# Patient Record
Sex: MALE | Race: WHITE | Employment: OTHER | URBAN - METROPOLITAN AREA
[De-identification: names, ages, dates, MRNs, and addresses within clinical notes are randomized per-mention and may not be internally consistent; named-entity substitution may affect disease eponyms.]

---

## 2017-01-16 PROBLEM — K42.9 UMBILICAL HERNIA WITHOUT OBSTRUCTION AND WITHOUT GANGRENE: Status: ACTIVE | Noted: 2017-01-16

## 2017-03-17 ENCOUNTER — HOSPITAL ENCOUNTER (OUTPATIENT)
Age: 82
Discharge: HOME | End: 2017-03-17
Attending: EMERGENCY MEDICINE
Payer: MEDICARE

## 2017-03-17 VITALS
OXYGEN SATURATION: 99 % | HEART RATE: 70 BPM | TEMPERATURE: 97 F | RESPIRATION RATE: 18 BRPM | SYSTOLIC BLOOD PRESSURE: 149 MMHG | BODY MASS INDEX: 26.52 KG/M2 | WEIGHT: 165 LBS | DIASTOLIC BLOOD PRESSURE: 92 MMHG | HEIGHT: 66 IN

## 2017-03-17 DIAGNOSIS — L03.116 CELLULITIS OF LEFT LOWER EXTREMITY: Primary | ICD-10-CM

## 2017-03-17 PROCEDURE — 87070 CULTURE OTHR SPECIMN AEROBIC: CPT | Performed by: EMERGENCY MEDICINE

## 2017-03-17 PROCEDURE — 99214 OFFICE O/P EST MOD 30 MIN: CPT

## 2017-03-17 PROCEDURE — 87205 SMEAR GRAM STAIN: CPT | Performed by: EMERGENCY MEDICINE

## 2017-03-17 RX ORDER — DOXYCYCLINE HYCLATE 100 MG/1
100 CAPSULE ORAL 2 TIMES DAILY
Qty: 20 CAPSULE | Refills: 0 | Status: SHIPPED | OUTPATIENT
Start: 2017-03-17 | End: 2017-03-27

## 2017-03-17 NOTE — ED INITIAL ASSESSMENT (HPI)
Left lower leg redness,+ swelling, was seen at a [facility], was on antibiotic , denies fever

## 2017-03-17 NOTE — ED PROVIDER NOTES
Tucker Villalpando is a 80year old male who presents for evaluation of  left leg swelling and redness  HPI:   Pt complains of left leg swelling and redness which he has had for the past week. Patient states he sustained a wound to his lateral left distal leg. T12   • Osteopenia      but with compression fractures   • History of colonic polyps      Repeat Colonscopy PRN   • Shingles    • Bilateral renal cysts      simple   • IFG (impaired fasting glucose)    • Sacral fracture (HCC)    • H/O cardiovascular stress Family history is noncontributory to current complaint      REVIEW OF SYSTEMS:     GENERAL: Denies fever or Chills.     EYES:denies blurred vision   HEENT: denies nasal congestion, sinus pain or ST  LUNGS: denies shortness of breath or cough  CARDIOVASCUL

## 2017-03-23 ENCOUNTER — OFFICE VISIT (OUTPATIENT)
Dept: AUDIOLOGY | Facility: CLINIC | Age: 82
End: 2017-03-23

## 2017-03-23 DIAGNOSIS — H90.3 SENSORINEURAL HEARING LOSS, BILATERAL: Primary | ICD-10-CM

## 2017-03-23 PROCEDURE — 92593 HEARING AID CHECK, BOTH EARS: CPT | Performed by: AUDIOLOGIST

## 2017-03-23 NOTE — PROGRESS NOTES
HEARING AID FOLLOW-UP    Mireille Hastings  7/1/1934  [id number]    PT noted increased difficulty hearing/understanding speech. Pt elected to try new technology.   Discussed communication needs, completed a needs assessment, discussed style, technology, trial

## 2017-03-29 ENCOUNTER — PRIOR ORIGINAL RECORDS (OUTPATIENT)
Dept: OTHER | Age: 82
End: 2017-03-29

## 2017-04-03 ENCOUNTER — PRIOR ORIGINAL RECORDS (OUTPATIENT)
Dept: OTHER | Age: 82
End: 2017-04-03

## 2017-04-04 ENCOUNTER — PRIOR ORIGINAL RECORDS (OUTPATIENT)
Dept: OTHER | Age: 82
End: 2017-04-04

## 2017-04-06 ENCOUNTER — OFFICE VISIT (OUTPATIENT)
Dept: AUDIOLOGY | Facility: CLINIC | Age: 82
End: 2017-04-06

## 2017-04-06 DIAGNOSIS — H90.3 SENSORINEURAL HEARING LOSS, BILATERAL: Primary | ICD-10-CM

## 2017-04-06 PROCEDURE — V5261 HEARING AID, DIGIT, BIN, BTE: HCPCS | Performed by: AUDIOLOGIST

## 2017-04-06 NOTE — PROGRESS NOTES
Hearing Aid Fitting    Mireille Wick purchased two hearing aids as a long-time user.     Hearing Aid Information       Right    Left   Make: Phonak Make: Phonak   Model: [id number] 312T Model: [id number]   Serial Number: [id number] Serial Number: [id number]

## 2017-04-12 ENCOUNTER — MYAURORA ACCOUNT LINK (OUTPATIENT)
Dept: OTHER | Age: 82
End: 2017-04-12

## 2017-04-12 ENCOUNTER — PRIOR ORIGINAL RECORDS (OUTPATIENT)
Dept: OTHER | Age: 82
End: 2017-04-12

## 2017-04-17 ENCOUNTER — LAB ENCOUNTER (OUTPATIENT)
Dept: LAB | Facility: HOSPITAL | Age: 82
End: 2017-04-17
Attending: UROLOGY
Payer: MEDICARE

## 2017-04-17 DIAGNOSIS — C61 PROSTATE CANCER (HCC): Primary | ICD-10-CM

## 2017-04-17 PROCEDURE — 36415 COLL VENOUS BLD VENIPUNCTURE: CPT

## 2017-04-17 NOTE — OR PREOP
StNatalie Watkins Rep., Mo Mendoza paged to inform him that a Rep. Needs to be at [facility] on 4-21-17 to reprogram Pacemaker.

## 2017-04-17 NOTE — OR PREOP
[address] Rep returned call, informed of patients upcoming surgery, informed her that we will call her with confirmed time for surgery in the afternoon of 4-20-17, surgery is 4-21-17.

## 2017-04-21 ENCOUNTER — ANESTHESIA (OUTPATIENT)
Dept: SURGERY | Facility: HOSPITAL | Age: 82
End: 2017-04-21
Payer: MEDICARE

## 2017-04-21 ENCOUNTER — SURGERY (OUTPATIENT)
Age: 82
End: 2017-04-21

## 2017-04-21 ENCOUNTER — ANESTHESIA EVENT (OUTPATIENT)
Dept: SURGERY | Facility: HOSPITAL | Age: 82
End: 2017-04-21
Payer: MEDICARE

## 2017-04-21 ENCOUNTER — HOSPITAL ENCOUNTER (OUTPATIENT)
Facility: HOSPITAL | Age: 82
Setting detail: HOSPITAL OUTPATIENT SURGERY
Discharge: HOME | End: 2017-04-21
Attending: SURGERY | Admitting: SURGERY
Payer: MEDICARE

## 2017-04-21 VITALS
RESPIRATION RATE: 16 BRPM | WEIGHT: 161.31 LBS | TEMPERATURE: 97 F | OXYGEN SATURATION: 97 % | HEART RATE: 70 BPM | SYSTOLIC BLOOD PRESSURE: 150 MMHG | DIASTOLIC BLOOD PRESSURE: 90 MMHG | HEIGHT: 66 IN | BODY MASS INDEX: 25.92 KG/M2

## 2017-04-21 DIAGNOSIS — K42.9 UMBILICAL HERNIA WITHOUT OBSTRUCTION AND WITHOUT GANGRENE: Primary | ICD-10-CM

## 2017-04-21 PROCEDURE — 0WQF0ZZ REPAIR ABDOMINAL WALL, OPEN APPROACH: ICD-10-PCS | Performed by: SURGERY

## 2017-04-21 PROCEDURE — 88302 TISSUE EXAM BY PATHOLOGIST: CPT | Performed by: SURGERY

## 2017-04-21 RX ORDER — HYDROMORPHONE HYDROCHLORIDE 1 MG/ML
0.6 INJECTION, SOLUTION INTRAMUSCULAR; INTRAVENOUS; SUBCUTANEOUS EVERY 5 MIN PRN
Status: DISCONTINUED | OUTPATIENT
Start: 2017-04-21 | End: 2017-04-21

## 2017-04-21 RX ORDER — METOCLOPRAMIDE 10 MG/1
10 TABLET ORAL ONCE
Status: DISCONTINUED | OUTPATIENT
Start: 2017-04-21 | End: 2017-04-21 | Stop reason: HOSPADM

## 2017-04-21 RX ORDER — SODIUM CHLORIDE, SODIUM LACTATE, POTASSIUM CHLORIDE, CALCIUM CHLORIDE 600; 310; 30; 20 MG/100ML; MG/100ML; MG/100ML; MG/100ML
INJECTION, SOLUTION INTRAVENOUS CONTINUOUS
Status: DISCONTINUED | OUTPATIENT
Start: 2017-04-21 | End: 2017-04-21

## 2017-04-21 RX ORDER — BUPIVACAINE HYDROCHLORIDE 2.5 MG/ML
INJECTION, SOLUTION EPIDURAL; INFILTRATION; INTRACAUDAL AS NEEDED
Status: DISCONTINUED | OUTPATIENT
Start: 2017-04-21 | End: 2017-04-21 | Stop reason: HOSPADM

## 2017-04-21 RX ORDER — HYDROMORPHONE HYDROCHLORIDE 1 MG/ML
0.4 INJECTION, SOLUTION INTRAMUSCULAR; INTRAVENOUS; SUBCUTANEOUS EVERY 5 MIN PRN
Status: DISCONTINUED | OUTPATIENT
Start: 2017-04-21 | End: 2017-04-21

## 2017-04-21 RX ORDER — MORPHINE SULFATE 2 MG/ML
2 INJECTION, SOLUTION INTRAMUSCULAR; INTRAVENOUS EVERY 10 MIN PRN
Status: DISCONTINUED | OUTPATIENT
Start: 2017-04-21 | End: 2017-04-21

## 2017-04-21 RX ORDER — ONDANSETRON 2 MG/ML
INJECTION INTRAMUSCULAR; INTRAVENOUS AS NEEDED
Status: DISCONTINUED | OUTPATIENT
Start: 2017-04-21 | End: 2017-04-21 | Stop reason: SURG

## 2017-04-21 RX ORDER — ROCURONIUM BROMIDE 10 MG/ML
INJECTION, SOLUTION INTRAVENOUS AS NEEDED
Status: DISCONTINUED | OUTPATIENT
Start: 2017-04-21 | End: 2017-04-21 | Stop reason: SURG

## 2017-04-21 RX ORDER — HYDROCODONE BITARTRATE AND ACETAMINOPHEN 5; 325 MG/1; MG/1
2 TABLET ORAL AS NEEDED
Status: DISCONTINUED | OUTPATIENT
Start: 2017-04-21 | End: 2017-04-21

## 2017-04-21 RX ORDER — FAMOTIDINE 20 MG/1
20 TABLET ORAL ONCE
Status: DISCONTINUED | OUTPATIENT
Start: 2017-04-21 | End: 2017-04-21 | Stop reason: HOSPADM

## 2017-04-21 RX ORDER — ACETAMINOPHEN 325 MG/1
650 TABLET ORAL ONCE
Status: COMPLETED | OUTPATIENT
Start: 2017-04-21 | End: 2017-04-21

## 2017-04-21 RX ORDER — DEXAMETHASONE SODIUM PHOSPHATE 4 MG/ML
VIAL (ML) INJECTION AS NEEDED
Status: DISCONTINUED | OUTPATIENT
Start: 2017-04-21 | End: 2017-04-21 | Stop reason: SURG

## 2017-04-21 RX ORDER — NALOXONE HYDROCHLORIDE 0.4 MG/ML
80 INJECTION, SOLUTION INTRAMUSCULAR; INTRAVENOUS; SUBCUTANEOUS AS NEEDED
Status: DISCONTINUED | OUTPATIENT
Start: 2017-04-21 | End: 2017-04-21

## 2017-04-21 RX ORDER — TRAMADOL HYDROCHLORIDE 50 MG/1
50 TABLET ORAL EVERY 6 HOURS PRN
Qty: 15 TABLET | Refills: 0 | Status: SHIPPED | OUTPATIENT
Start: 2017-04-21 | End: 2017-05-16

## 2017-04-21 RX ORDER — DOCUSATE SODIUM 100 MG/1
100 CAPSULE, LIQUID FILLED ORAL 2 TIMES DAILY PRN
Qty: 15 CAPSULE | Refills: 0 | Status: SHIPPED | OUTPATIENT
Start: 2017-04-21 | End: 2017-05-16

## 2017-04-21 RX ORDER — MORPHINE SULFATE 4 MG/ML
4 INJECTION, SOLUTION INTRAMUSCULAR; INTRAVENOUS EVERY 10 MIN PRN
Status: DISCONTINUED | OUTPATIENT
Start: 2017-04-21 | End: 2017-04-21

## 2017-04-21 RX ORDER — ONDANSETRON 2 MG/ML
4 INJECTION INTRAMUSCULAR; INTRAVENOUS ONCE AS NEEDED
Status: DISCONTINUED | OUTPATIENT
Start: 2017-04-21 | End: 2017-04-21

## 2017-04-21 RX ORDER — HYDROCODONE BITARTRATE AND ACETAMINOPHEN 5; 325 MG/1; MG/1
1 TABLET ORAL AS NEEDED
Status: DISCONTINUED | OUTPATIENT
Start: 2017-04-21 | End: 2017-04-21

## 2017-04-21 RX ORDER — HYDROMORPHONE HYDROCHLORIDE 1 MG/ML
0.2 INJECTION, SOLUTION INTRAMUSCULAR; INTRAVENOUS; SUBCUTANEOUS EVERY 5 MIN PRN
Status: DISCONTINUED | OUTPATIENT
Start: 2017-04-21 | End: 2017-04-21

## 2017-04-21 RX ORDER — MORPHINE SULFATE 10 MG/ML
6 INJECTION, SOLUTION INTRAMUSCULAR; INTRAVENOUS EVERY 10 MIN PRN
Status: DISCONTINUED | OUTPATIENT
Start: 2017-04-21 | End: 2017-04-21

## 2017-04-21 RX ADMIN — ONDANSETRON 4 MG: 2 INJECTION INTRAMUSCULAR; INTRAVENOUS at 12:48:00

## 2017-04-21 RX ADMIN — SODIUM CHLORIDE, SODIUM LACTATE, POTASSIUM CHLORIDE, CALCIUM CHLORIDE: 600; 310; 30; 20 INJECTION, SOLUTION INTRAVENOUS at 13:10:00

## 2017-04-21 RX ADMIN — ROCURONIUM BROMIDE 40 MG: 10 INJECTION, SOLUTION INTRAVENOUS at 12:14:00

## 2017-04-21 RX ADMIN — DEXAMETHASONE SODIUM PHOSPHATE 4 MG: 4 MG/ML VIAL (ML) INJECTION at 12:32:00

## 2017-04-21 RX ADMIN — SODIUM CHLORIDE, SODIUM LACTATE, POTASSIUM CHLORIDE, CALCIUM CHLORIDE: 600; 310; 30; 20 INJECTION, SOLUTION INTRAVENOUS at 12:11:00

## 2017-04-21 NOTE — H&P
Elyse Carter is a 80year old male  complains of focal hernia which she has had for at least a year and a half according to the patient is wife.  It may have increased in size though he is not certain.  He denies any pain.  No history of incarceration.      Repeat Colonscopy PRN    •  Shingles      •  Bilateral renal cysts          simple    •  IFG (impaired fasting glucose)      •  Sacral fracture (HCC)      •  H/O cardiovascular stress test          NM stress testing - unremarkable (9/2015)    [facility]   07/2016        Family History    Problem  Relation  Age of Onset    •  Hypertension  Father      •  Diabetes  Mother      •  Obesity  Mother      •  Heart Disorder  Mother          MI    •  Heart Disease  Mother          CAD    •  Cancer  Brother        leave town in 2 weeks and stay 4-6 weeks.  I explained to the patient and his wife the possibility though unlikely, of incarceration or strangulation.  They understand his risk went away for the present time.  Will follow up with me after return from [facility]

## 2017-04-21 NOTE — DISCHARGE SUMMARY
Outpatient Surgery Brief Discharge Summary         Patient ID:  Latricia Gomes  [id number]  80year old  7/1/1934    Discharge Diagnoses: Umbilical hernia     Procedures: umbilical hernia repair    Discharged Condition: stable    Disposition: home    Patient vomiting:   · Try to move around less.    · Eat less than usual or drink only liquids until the next morning   · Nausea should resolve in about 24 hours    If you have a problem when you are at home:    · Call your surgeons office         30 Days    After Y post-operative week. Prescription pain medication can make you nauseated, light-headed, and constipated. Take it with food and discontinue if side effects occur. No alcohol or driving while taking prescription medications.  If you need to, take an over t

## 2017-04-21 NOTE — BRIEF OP NOTE
[facility] POST ANESTHESIA CARE UNIT  Brief Op Note       Patients Name: Divya Greer  Attending Physician: Gracie Deshpande MD  Operating Physician: Bhavesh Preez MD  CSN: [id number]     Location:  OR  MRN: [id number]    YOB: 1934  [facility]

## 2017-04-21 NOTE — ANESTHESIA PREPROCEDURE EVALUATION
Anesthesia PreOp Note    HPI:     Fritz Quinn is a 80year old male who presents for preoperative consultation requested by: Efrain Allen MD    Date of Surgery: 4/21/2017    Procedure(s):   HERNIA UMBILICAL REPAIR ADULT  Indication: Umbilical hernia     * Cataract    • Osteoarthritis            Past Surgical History    UPPER GI ENDOSCOPY,BIOPSY  9/12/2011    Comment Performed by LUDA MANUEL at [place] [address]  9/12/2011    Comment Performed by LUDA MANUEL at [place] [address] MOUTH EVERY DAY Disp: 90 tablet Rfl: 1 4/21/2017 at 0500   Cholecalciferol (VITAMIN D) 1000 UNITS Oral Tab Take 2,000 Units by mouth. Disp:  Rfl:  4/20/2017 at 0830   Rivaroxaban (XARELTO) 20 MG Oral Tab Take 15 mg by mouth daily with food.    Disp:  Rfl: History  None on file     Social History Main Topics   Smoking status: Former Smoker  For 10.00 Years     Smokeless tobacco: Never Used    Alcohol Use: Yes  0.0 oz/week    0 Standard drinks or equivalent per week         Comment: 2 beers a month    Drug Us Risks Discussed With:  Patient  Discussed plan with:  CRNA      I have informed Darl Ozie Severance  of the nature of the anesthetic plan, benefits, risks, major complications, and any alternative forms of anesthetic management.    All of the patient's questions we

## 2017-04-21 NOTE — ANESTHESIA POSTPROCEDURE EVALUATION
Patient: Nancy Hagan    Procedure Summary     Date Anesthesia Start Anesthesia Stop Room / Location    04/21/17 1211 1310 [facility] MAIN OR 05 / 300 [facility] MAIN OR       Procedure Diagnosis Surgeon Responsible Provider    HERNIA UMBILICAL REPAIR ADULT (N/A ) (Umbilical

## 2017-04-27 ENCOUNTER — OFFICE VISIT (OUTPATIENT)
Dept: AUDIOLOGY | Facility: CLINIC | Age: 82
End: 2017-04-27

## 2017-04-27 DIAGNOSIS — H90.3 SENSORINEURAL HEARING LOSS, BILATERAL: Primary | ICD-10-CM

## 2017-04-27 PROCEDURE — 92593 HEARING AID CHECK, BOTH EARS: CPT | Performed by: AUDIOLOGIST

## 2017-04-27 NOTE — PROGRESS NOTES
HEARING AID FOLLOW-UP    Mireille Dumas  7/1/1934  [id number]    Pt has been using new technology since 4/6/17 but has been alternating with his older technology.   Reviewed recommendation for consistently using the new technology until the very end of his t

## 2017-04-28 NOTE — OPERATIVE REPORT
[facility]    PATIENT'S NAME: Danelle Sheth   ATTENDING PHYSICIAN: Kaiden Brooks MD   OPERATING PHYSICIAN: Kaiden Brooks MD   PATIENT ACCOUNT#:   [id number]    LOCATION:  [address]  MEDICAL RECORD #:   [id number]       DATE OF BIRTH:

## 2017-05-04 ENCOUNTER — PRIOR ORIGINAL RECORDS (OUTPATIENT)
Dept: OTHER | Age: 82
End: 2017-05-04

## 2017-05-16 ENCOUNTER — PRIOR ORIGINAL RECORDS (OUTPATIENT)
Dept: OTHER | Age: 82
End: 2017-05-16

## 2017-05-16 PROCEDURE — 84156 ASSAY OF PROTEIN URINE: CPT | Performed by: INTERNAL MEDICINE

## 2017-05-16 PROCEDURE — 84165 PROTEIN E-PHORESIS SERUM: CPT | Performed by: INTERNAL MEDICINE

## 2017-05-16 PROCEDURE — 83883 ASSAY NEPHELOMETRY NOT SPEC: CPT | Performed by: INTERNAL MEDICINE

## 2017-05-16 PROCEDURE — 82570 ASSAY OF URINE CREATININE: CPT | Performed by: INTERNAL MEDICINE

## 2017-05-16 PROCEDURE — 82607 VITAMIN B-12: CPT | Performed by: INTERNAL MEDICINE

## 2017-05-16 PROCEDURE — 86334 IMMUNOFIX E-PHORESIS SERUM: CPT | Performed by: INTERNAL MEDICINE

## 2017-05-18 ENCOUNTER — OFFICE VISIT (OUTPATIENT)
Dept: AUDIOLOGY | Facility: CLINIC | Age: 82
End: 2017-05-18

## 2017-05-18 DIAGNOSIS — H90.3 SENSORINEURAL HEARING LOSS, BILATERAL: Primary | ICD-10-CM

## 2017-05-18 PROCEDURE — 92593 HEARING AID CHECK, BOTH EARS: CPT | Performed by: AUDIOLOGIST

## 2017-05-18 NOTE — PROGRESS NOTES
HEARING AID FOLLOW-UP    Mireille Weldon  7/1/1934  [id number]    Patient elected to return his new hearing aids for credit due to some difficulty with insertion and lack of perceived significant benefit.   Sent back to [facility] and completed refund request.  T

## 2017-06-02 PROCEDURE — 82784 ASSAY IGA/IGD/IGG/IGM EACH: CPT | Performed by: INTERNAL MEDICINE

## 2017-06-02 PROCEDURE — 82232 ASSAY OF BETA-2 PROTEIN: CPT | Performed by: INTERNAL MEDICINE

## 2017-06-06 PROCEDURE — 86335 IMMUNFIX E-PHORSIS/URINE/CSF: CPT | Performed by: INTERNAL MEDICINE

## 2017-06-06 PROCEDURE — 36415 COLL VENOUS BLD VENIPUNCTURE: CPT | Performed by: INTERNAL MEDICINE

## 2017-06-06 PROCEDURE — 84156 ASSAY OF PROTEIN URINE: CPT | Performed by: INTERNAL MEDICINE

## 2017-06-06 PROCEDURE — 83883 ASSAY NEPHELOMETRY NOT SPEC: CPT | Performed by: INTERNAL MEDICINE

## 2017-06-10 ENCOUNTER — APPOINTMENT (OUTPATIENT)
Dept: GENERAL RADIOLOGY | Facility: HOSPITAL | Age: 82
End: 2017-06-10
Payer: MEDICARE

## 2017-06-10 ENCOUNTER — HOSPITAL ENCOUNTER (EMERGENCY)
Facility: HOSPITAL | Age: 82
Discharge: HOME | End: 2017-06-10
Payer: MEDICARE

## 2017-06-10 VITALS
BODY MASS INDEX: 25.71 KG/M2 | TEMPERATURE: 99 F | HEIGHT: 66 IN | DIASTOLIC BLOOD PRESSURE: 85 MMHG | RESPIRATION RATE: 18 BRPM | WEIGHT: 160 LBS | SYSTOLIC BLOOD PRESSURE: 138 MMHG | HEART RATE: 72 BPM | OXYGEN SATURATION: 97 %

## 2017-06-10 DIAGNOSIS — J06.9 VIRAL UPPER RESPIRATORY TRACT INFECTION: Primary | ICD-10-CM

## 2017-06-10 PROCEDURE — 71020 XR CHEST PA + LAT CHEST (CPT=71020): CPT

## 2017-06-10 PROCEDURE — 99283 EMERGENCY DEPT VISIT LOW MDM: CPT

## 2017-06-10 RX ORDER — PREDNISONE 20 MG/1
40 TABLET ORAL DAILY
Qty: 10 TABLET | Refills: 0 | Status: SHIPPED | OUTPATIENT
Start: 2017-06-10 | End: 2017-06-15

## 2017-06-10 RX ORDER — PREDNISONE 20 MG/1
40 TABLET ORAL ONCE
Status: COMPLETED | OUTPATIENT
Start: 2017-06-10 | End: 2017-06-10

## 2017-06-10 RX ORDER — BENZONATATE 100 MG/1
100 CAPSULE ORAL 3 TIMES DAILY PRN
Qty: 30 CAPSULE | Refills: 0 | Status: SHIPPED | OUTPATIENT
Start: 2017-06-10 | End: 2017-07-10

## 2017-06-11 NOTE — ED NOTES
Rec'd patient sitting on cart with family members at bedside with complaints of congested cough x 3 days. Patient denies fever/chills, N/V/D.   States wife has been sick for the last week with similar medications and was given antibiotics and cough medicin

## 2017-06-11 NOTE — ED PROVIDER NOTES
Patient Seen in: [facility] AND [facility] Emergency Department    History   Patient presents with:  Cough      HPI    Patient presents complaining of a dry cough that is worse at nighttime for the past 3 days.   His wife became sick with a symptom symptoms 1-1/2 Comment kyphoplasty - 7/20244    COLONOSCOPY,REMV LESN,SNARE  10/17/2012    Comment Procedure: COLONOSCOPY, POSSIBLE BIOPSY, POSSIBLE POLYPECTOMY 66661;  Surgeon: Karen Vega MD;  Location: [address] [place] [address]    COLONOSCOPY,BIOPSY  10/17/2012    Com Packs/Day: 0.00  Years: 10      Smokeless Status: Never Used                        Alcohol Use: Yes           0.0 oz/week       0 Standard drinks or equivalent per week       Comment: 2 beers a month    Drug Use: No            Other Topics            Conc data to display      Xr Chest Pa + Lat Chest (cpt=71020)    6/10/2017  CONCLUSION:  1. Stable chest demonstrating cardiomegaly with dual-lead pacemaker placement without radiographically evident acute intrathoracic process.   2. Kyphoscoliosis with compress

## 2017-06-12 LAB
ALBUMIN: 3.2 G/DL
BILIRUBIN TOTAL: 0.82 MG/DL
BUN: 20 MG/DL
CALCIUM: 8.1 MG/DL
CHLORIDE: 106 MEQ/L
CREATININE, SERUM: 1.04 MG/DL
FREE T4: 1.26 MG/DL
GLUCOSE: 101 MG/DL
HEMATOCRIT: 44.4 %
HEMOGLOBIN: 13.9 G/DL
MCH: 30.3 PG
MCHC: 31.3 G/DL
MCV: 96.9 FL
PLATELETS: 129 K/UL
POTASSIUM, SERUM: 4.8 MEQ/L
PROTEIN, TOTAL: 6.7 G/DL
RED BLOOD COUNT: 4.58 X 10-6/U
SGOT (AST): 31 IU/L
SGPT (ALT): 24 IU/L
SODIUM: 139 MEQ/L
WHITE BLOOD COUNT: 4.44 X 10-3/U

## 2017-06-13 ENCOUNTER — PRIOR ORIGINAL RECORDS (OUTPATIENT)
Dept: OTHER | Age: 82
End: 2017-06-13

## 2017-06-19 ENCOUNTER — OFFICE VISIT (OUTPATIENT)
Dept: OTOLARYNGOLOGY | Facility: CLINIC | Age: 82
End: 2017-06-19

## 2017-06-19 VITALS — HEIGHT: 66 IN | BODY MASS INDEX: 26.52 KG/M2 | WEIGHT: 165 LBS | TEMPERATURE: 97 F

## 2017-06-19 DIAGNOSIS — H61.23 BILATERAL IMPACTED CERUMEN: Primary | ICD-10-CM

## 2017-06-19 PROCEDURE — 99213 OFFICE O/P EST LOW 20 MIN: CPT | Performed by: OTOLARYNGOLOGY

## 2017-06-19 PROCEDURE — 69210 REMOVE IMPACTED EAR WAX UNI: CPT | Performed by: OTOLARYNGOLOGY

## 2017-06-19 NOTE — PROGRESS NOTES
Leonard Levi is a 80year old male.   Patient presents with:  Ear Wax      HISTORY OF PRESENT ILLNESS  6/19/2017  Patient presents for cerumen removal. No other complaints or concerns at this time    Social History    Marital Status:              Spou Osteoarthritis            Past Surgical History    UPPER GI ENDOSCOPY,BIOPSY  9/12/2011    Comment Performed by LUDA MANUEL at [place] [address]  9/12/2011    Comment Performed by LUDA MANUEL at [address] fever and weight loss. ENMT Negative Drooling. Eyes Negative Blurred vision and vision changes. Respiratory Negative Dyspnea and wheezing. Cardio Negative Chest pain, irregular heartbeat/palpitations and syncope.    GI Negative Abdominal pain and di mouth 2 (two) times daily. , Disp: 14 capsule, Rfl: 0  •  benzonatate 100 MG Oral Cap, Take 1 capsule (100 mg total) by mouth 3 (three) times daily as needed for cough. , Disp: 30 capsule, Rfl: 0  •  LEVOTHYROXINE SODIUM 112 MCG Oral Tab, TAKE 1 TABLET BY MO

## 2017-07-04 ENCOUNTER — HOSPITAL ENCOUNTER (OUTPATIENT)
Age: 82
Discharge: HOME | End: 2017-07-04
Attending: EMERGENCY MEDICINE
Payer: COMMERCIAL

## 2017-07-04 VITALS
HEART RATE: 70 BPM | OXYGEN SATURATION: 100 % | TEMPERATURE: 98 F | WEIGHT: 160 LBS | DIASTOLIC BLOOD PRESSURE: 81 MMHG | RESPIRATION RATE: 20 BRPM | BODY MASS INDEX: 25.71 KG/M2 | SYSTOLIC BLOOD PRESSURE: 129 MMHG | HEIGHT: 66 IN

## 2017-07-04 DIAGNOSIS — L03.116 CELLULITIS OF LEFT LOWER EXTREMITY: Primary | ICD-10-CM

## 2017-07-04 DIAGNOSIS — S81.812A LACERATION OF LEFT LOWER EXTREMITY, INITIAL ENCOUNTER: ICD-10-CM

## 2017-07-04 PROCEDURE — 99214 OFFICE O/P EST MOD 30 MIN: CPT

## 2017-07-04 PROCEDURE — 99213 OFFICE O/P EST LOW 20 MIN: CPT

## 2017-07-04 RX ORDER — CEPHALEXIN 500 MG/1
500 CAPSULE ORAL 3 TIMES DAILY
Qty: 30 CAPSULE | Refills: 0 | Status: SHIPPED | OUTPATIENT
Start: 2017-07-04 | End: 2017-07-14

## 2017-07-04 RX ORDER — GINSENG 100 MG
CAPSULE ORAL ONCE
Status: COMPLETED | OUTPATIENT
Start: 2017-07-04 | End: 2017-07-04

## 2017-07-04 NOTE — ED NOTES
Pt with open wound to left lower leg with minimal oozing clear drainage, mild redness, minimal swelling, cms intact, mild tenderness to palpation

## 2017-07-04 NOTE — ED PROVIDER NOTES
Patient Seen in: [facility]    History   Patient presents with:  Laceration Abrasion (integumentary)    Stated Complaint: left leg open wound    HPI    The patient is an 12-year-old male with a history of atrial fibrillat POSSIBLE BIOPSY,                POSSIBLE POLYPECTOMY 78714;  Surgeon: Jerson Swartz MD;  Location: [address] [place] [address]  10/17/2012: Trenton Rizo      Comment: Procedure: COLONOSCOPY, POSSIBLE BIOPSY,                POSSIBLE MG/ML Subcutaneous Solution,  Inject 60 mg sub-q every 6 months. Calcium Carb-Cholecalciferol (CALCIUM-VITAMIN D3) 600-400 MG-UNIT Oral Cap,  Take  by mouth daily.    benzonatate 200 MG Oral Cap,  Take 1 capsule (200 mg total) by mouth 3 (three) times mona display    ============================================================  ED Course  ------------------------------------------------------------  MDM     Patient has laceration with cellulitis although it is not clear when the erythema began.   Will treat w

## 2017-07-11 NOTE — ED AVS SNAPSHOT
[facility] Emergency Department    [address]     [place] [address]    Phone:  [phone]    Fax:  [phone]           Ori Michaels   MRN: [id number]    Department:  [facility] Emergency Department   Date of Visit:  6/10/201 Letter regarding results sent and Class Registration line at [phone] or find a doctor online by visiting [email or web address].    IF THERE IS ANY CHANGE OR WORSENING OF YOUR CONDITION, CALL YOUR PRIMARY CARE PHYSICIAN AT ONCE OR RETURN IMMEDIATELY TO [address] [place] [address].     If

## 2017-08-08 ENCOUNTER — PRIOR ORIGINAL RECORDS (OUTPATIENT)
Dept: OTHER | Age: 82
End: 2017-08-08

## 2017-08-31 ENCOUNTER — OFFICE VISIT (OUTPATIENT)
Dept: OTOLARYNGOLOGY | Facility: CLINIC | Age: 82
End: 2017-08-31

## 2017-08-31 VITALS
HEIGHT: 65 IN | SYSTOLIC BLOOD PRESSURE: 142 MMHG | WEIGHT: 162 LBS | DIASTOLIC BLOOD PRESSURE: 68 MMHG | BODY MASS INDEX: 26.99 KG/M2 | TEMPERATURE: 98 F

## 2017-08-31 DIAGNOSIS — H61.23 BILATERAL IMPACTED CERUMEN: Primary | ICD-10-CM

## 2017-08-31 PROCEDURE — 69210 REMOVE IMPACTED EAR WAX UNI: CPT | Performed by: OTOLARYNGOLOGY

## 2017-08-31 NOTE — PROGRESS NOTES
Latricia Gomes is a 80year old male.   Patient presents with:  Cerumen Impaction: patient presents for ear cleaning      HISTORY OF PRESENT ILLNESS    Patient presents for cerumen removal. No other complaints or concerns at this time    Social History    Gema Faulkner 4/20/2011   • Visual impairment     glasses       Past Surgical History:  No date: ABLATION      Comment: a-fib ablation -> 2008  No date: BACK SURGERY      Comment: kyphoplasty - 7/72012  No date: CARDIAC PACEMAKER PLACEMENT      Comment: July 2016 last c Comment: Performed by LUDA MANUEL at [facility] Neg/Pos Details   Constitutional Negative Fatigue, fever and weight loss. ENMT Negative Drooling.    Eyes Negative Blurred vision and vision change Outpatient Prescriptions:   •  LEVOTHYROXINE SODIUM 112 MCG Oral Tab, TAKE 1 TABLET BY MOUTH DAILY, Disp: 90 tablet, Rfl: 1  •  benzonatate 200 MG Oral Cap, Take 1 capsule (200 mg total) by mouth 3 (three) times daily as needed for cough. , Disp: 20 capsule

## 2017-09-07 LAB
CHOLESTEROL, TOTAL: 135 MG/DL
HDL CHOLESTEROL: 41 MG/DL
HEMATOCRIT: 42.4 %
HEMOGLOBIN: 13.2 G/DL
LDL CHOLESTEROL: 84 MG/DL
PLATELETS: 122 K/UL
RED BLOOD COUNT: 4.38 X 10-6/U
THYROID STIMULATING HORMONE: 0.79 MLU/L
TRIGLYCERIDES: 48 MG/DL
WHITE BLOOD COUNT: 3.98 X 10-3/U

## 2017-09-08 ENCOUNTER — PRIOR ORIGINAL RECORDS (OUTPATIENT)
Dept: OTHER | Age: 82
End: 2017-09-08

## 2017-09-08 LAB
HEMATOCRIT: 39.7 %
HEMOGLOBIN: 12.6 G/DL
PLATELETS: 122 K/UL
RED BLOOD COUNT: 4.17 X 10-6/U
WHITE BLOOD COUNT: 4.85 X 10-3/U

## 2017-09-20 PROCEDURE — 88305 TISSUE EXAM BY PATHOLOGIST: CPT | Performed by: INTERNAL MEDICINE

## 2017-09-29 ENCOUNTER — PRIOR ORIGINAL RECORDS (OUTPATIENT)
Dept: OTHER | Age: 82
End: 2017-09-29

## 2017-09-29 PROCEDURE — 83883 ASSAY NEPHELOMETRY NOT SPEC: CPT | Performed by: INTERNAL MEDICINE

## 2017-09-29 PROCEDURE — 84165 PROTEIN E-PHORESIS SERUM: CPT | Performed by: INTERNAL MEDICINE

## 2017-09-29 PROCEDURE — 82607 VITAMIN B-12: CPT | Performed by: INTERNAL MEDICINE

## 2017-09-29 PROCEDURE — 82784 ASSAY IGA/IGD/IGG/IGM EACH: CPT | Performed by: INTERNAL MEDICINE

## 2017-09-29 PROCEDURE — 86334 IMMUNOFIX E-PHORESIS SERUM: CPT | Performed by: INTERNAL MEDICINE

## 2017-09-29 PROCEDURE — 82746 ASSAY OF FOLIC ACID SERUM: CPT | Performed by: INTERNAL MEDICINE

## 2017-10-11 PROCEDURE — 83921 ORGANIC ACID SINGLE QUANT: CPT | Performed by: INTERNAL MEDICINE

## 2017-10-11 PROCEDURE — 82746 ASSAY OF FOLIC ACID SERUM: CPT | Performed by: INTERNAL MEDICINE

## 2017-10-11 PROCEDURE — 82607 VITAMIN B-12: CPT | Performed by: INTERNAL MEDICINE

## 2017-11-13 ENCOUNTER — OFFICE VISIT (OUTPATIENT)
Dept: AUDIOLOGY | Facility: CLINIC | Age: 82
End: 2017-11-13

## 2017-11-13 DIAGNOSIS — H90.3 SENSORINEURAL HEARING LOSS, BILATERAL: Primary | ICD-10-CM

## 2017-11-13 PROCEDURE — 92593 HEARING AID CHECK, BOTH EARS: CPT | Performed by: AUDIOLOGIST

## 2017-11-13 PROCEDURE — V5266 BATTERY FOR HEARING DEVICE: HCPCS | Performed by: AUDIOLOGIST

## 2017-11-13 NOTE — PROGRESS NOTES
HEARING AID FOLLOW-UP    Mireille Flores Nel  7/1/1934  [id number]      Pt was seen for a six months follow-up. Aids were in good condition. Cleaned and suctioned aids and earmolds. Changed microphone covers, #2 Spice tubing, and wax traps.   Completed misha

## 2017-12-11 LAB
HEMATOCRIT: 40.4 %
HEMOGLOBIN A1C: 5.4 %
HEMOGLOBIN: 12.7 G/DL
PLATELETS: 117 K/UL
RED BLOOD COUNT: 4.23 X 10-6/U
WHITE BLOOD COUNT: 4.49 X 10-3/U

## 2017-12-12 ENCOUNTER — PRIOR ORIGINAL RECORDS (OUTPATIENT)
Dept: OTHER | Age: 82
End: 2017-12-12

## 2018-03-30 PROCEDURE — 83883 ASSAY NEPHELOMETRY NOT SPEC: CPT | Performed by: INTERNAL MEDICINE

## 2018-03-30 PROCEDURE — 84165 PROTEIN E-PHORESIS SERUM: CPT | Performed by: INTERNAL MEDICINE

## 2018-03-30 PROCEDURE — 86334 IMMUNOFIX E-PHORESIS SERUM: CPT | Performed by: INTERNAL MEDICINE

## 2018-03-30 PROCEDURE — 82607 VITAMIN B-12: CPT | Performed by: INTERNAL MEDICINE

## 2018-04-12 ENCOUNTER — MYAURORA ACCOUNT LINK (OUTPATIENT)
Dept: OTHER | Age: 83
End: 2018-04-12

## 2018-04-16 ENCOUNTER — PRIOR ORIGINAL RECORDS (OUTPATIENT)
Dept: OTHER | Age: 83
End: 2018-04-16

## 2018-04-16 ENCOUNTER — HOSPITAL ENCOUNTER (OUTPATIENT)
Dept: GENERAL RADIOLOGY | Facility: HOSPITAL | Age: 83
Discharge: HOME | End: 2018-04-16
Attending: INTERNAL MEDICINE
Payer: MEDICARE

## 2018-04-16 ENCOUNTER — LAB ENCOUNTER (OUTPATIENT)
Dept: LAB | Facility: HOSPITAL | Age: 83
End: 2018-04-16
Attending: INTERNAL MEDICINE
Payer: MEDICARE

## 2018-04-16 DIAGNOSIS — I48.91 ATRIAL FIBRILLATION, UNSPECIFIED TYPE (HCC): ICD-10-CM

## 2018-04-16 DIAGNOSIS — Z01.810 PRE-OPERATIVE CARDIOVASCULAR EXAMINATION: ICD-10-CM

## 2018-04-16 PROCEDURE — 85025 COMPLETE CBC W/AUTO DIFF WBC: CPT

## 2018-04-16 PROCEDURE — 87641 MR-STAPH DNA AMP PROBE: CPT

## 2018-04-16 PROCEDURE — 36415 COLL VENOUS BLD VENIPUNCTURE: CPT

## 2018-04-16 PROCEDURE — 71046 X-RAY EXAM CHEST 2 VIEWS: CPT | Performed by: INTERNAL MEDICINE

## 2018-04-16 PROCEDURE — 80048 BASIC METABOLIC PNL TOTAL CA: CPT

## 2018-04-17 LAB
BUN: 24 MG/DL
CALCIUM: 8.8 MG/DL
CHLORIDE: 105 MEQ/L
CREATININE, SERUM: 0.97 MG/DL
GLUCOSE: 100 MG/DL
HEMATOCRIT: 41.2 %
HEMOGLOBIN: 13.9 G/DL
PLATELETS: 129 K/UL
POTASSIUM, SERUM: 4.2 MEQ/L
RED BLOOD COUNT: 4.35 X 10-6/U
SODIUM: 136 MEQ/L
WHITE BLOOD COUNT: 4.9 X 10-3/U

## 2018-04-19 ENCOUNTER — APPOINTMENT (OUTPATIENT)
Dept: GENERAL RADIOLOGY | Facility: HOSPITAL | Age: 83
End: 2018-04-19
Attending: INTERNAL MEDICINE
Payer: MEDICARE

## 2018-04-19 ENCOUNTER — HOSPITAL ENCOUNTER (OUTPATIENT)
Dept: INTERVENTIONAL RADIOLOGY/VASCULAR | Facility: HOSPITAL | Age: 83
Discharge: HOME | End: 2018-04-20
Attending: INTERNAL MEDICINE | Admitting: HOSPITALIST
Payer: MEDICARE

## 2018-04-19 DIAGNOSIS — R73.01 IFG (IMPAIRED FASTING GLUCOSE): ICD-10-CM

## 2018-04-19 DIAGNOSIS — D69.6 THROMBOCYTOPENIA (HCC): ICD-10-CM

## 2018-04-19 DIAGNOSIS — Z23 NEED FOR PROPHYLACTIC VACCINATION AND INOCULATION AGAINST INFLUENZA: ICD-10-CM

## 2018-04-19 DIAGNOSIS — T82.110A PACEMAKER LEAD MALFUNCTION: ICD-10-CM

## 2018-04-19 DIAGNOSIS — R74.01 TRANSAMINITIS: ICD-10-CM

## 2018-04-19 DIAGNOSIS — E55.9 VITAMIN D DEFICIENCY: ICD-10-CM

## 2018-04-19 DIAGNOSIS — E03.9 HYPOTHYROIDISM, UNSPECIFIED TYPE: ICD-10-CM

## 2018-04-19 PROCEDURE — 02HK0JZ INSERTION OF PACEMAKER LEAD INTO RIGHT VENTRICLE, OPEN APPROACH: ICD-10-PCS | Performed by: INTERNAL MEDICINE

## 2018-04-19 PROCEDURE — 99153 MOD SED SAME PHYS/QHP EA: CPT

## 2018-04-19 PROCEDURE — 99152 MOD SED SAME PHYS/QHP 5/>YRS: CPT

## 2018-04-19 PROCEDURE — A4216 STERILE WATER/SALINE, 10 ML: HCPCS

## 2018-04-19 PROCEDURE — 71045 X-RAY EXAM CHEST 1 VIEW: CPT | Performed by: INTERNAL MEDICINE

## 2018-04-19 PROCEDURE — 33216 INSERT 1 ELECTRODE PM-DEFIB: CPT

## 2018-04-19 PROCEDURE — 75820 VEIN X-RAY ARM/LEG: CPT

## 2018-04-19 PROCEDURE — 36005 INJECTION EXT VENOGRAPHY: CPT

## 2018-04-19 RX ORDER — SODIUM CHLORIDE 9 MG/ML
INJECTION, SOLUTION INTRAVENOUS
Status: DISPENSED
Start: 2018-04-19 | End: 2018-04-19

## 2018-04-19 RX ORDER — CEPHALEXIN 500 MG/1
500 CAPSULE ORAL 4 TIMES DAILY
COMMUNITY
End: 2018-08-14

## 2018-04-19 RX ORDER — ONDANSETRON 2 MG/ML
4 INJECTION INTRAMUSCULAR; INTRAVENOUS EVERY 6 HOURS PRN
Status: DISCONTINUED | OUTPATIENT
Start: 2018-04-19 | End: 2018-04-20

## 2018-04-19 RX ORDER — CEFAZOLIN SODIUM/WATER 2 G/20 ML
SYRINGE (ML) INTRAVENOUS
Status: COMPLETED
Start: 2018-04-19 | End: 2018-04-19

## 2018-04-19 RX ORDER — ACETAMINOPHEN 325 MG/1
650 TABLET ORAL EVERY 6 HOURS PRN
Status: DISCONTINUED | OUTPATIENT
Start: 2018-04-19 | End: 2018-04-20

## 2018-04-19 RX ORDER — SODIUM CHLORIDE 9 MG/ML
INJECTION, SOLUTION INTRAVENOUS
Status: COMPLETED
Start: 2018-04-19 | End: 2018-04-19

## 2018-04-19 RX ORDER — LIDOCAINE HYDROCHLORIDE AND EPINEPHRINE 10; 10 MG/ML; UG/ML
INJECTION, SOLUTION INFILTRATION; PERINEURAL
Status: COMPLETED
Start: 2018-04-19 | End: 2018-04-19

## 2018-04-19 RX ORDER — GENTAMICIN SULFATE 40 MG/ML
40 INJECTION, SOLUTION INTRAMUSCULAR; INTRAVENOUS ONCE
Status: DISCONTINUED | OUTPATIENT
Start: 2018-04-19 | End: 2018-04-20

## 2018-04-19 RX ORDER — GENTAMICIN 10 MG/ML
40 INJECTION, SOLUTION INTRAMUSCULAR; INTRAVENOUS ONCE
Status: DISCONTINUED | OUTPATIENT
Start: 2018-04-19 | End: 2018-04-19

## 2018-04-19 RX ORDER — LEVOTHYROXINE SODIUM 112 UG/1
112 TABLET ORAL
Status: DISCONTINUED | OUTPATIENT
Start: 2018-04-19 | End: 2018-04-20

## 2018-04-19 RX ORDER — BACITRACIN 50000 [USP'U]/1
INJECTION, POWDER, LYOPHILIZED, FOR SOLUTION INTRAMUSCULAR
Status: COMPLETED
Start: 2018-04-19 | End: 2018-04-19

## 2018-04-19 RX ORDER — MIDAZOLAM HYDROCHLORIDE 1 MG/ML
INJECTION INTRAMUSCULAR; INTRAVENOUS
Status: COMPLETED
Start: 2018-04-19 | End: 2018-04-19

## 2018-04-19 RX ORDER — CEFAZOLIN SODIUM/WATER 2 G/20 ML
2 SYRINGE (ML) INTRAVENOUS EVERY 8 HOURS
Status: COMPLETED | OUTPATIENT
Start: 2018-04-19 | End: 2018-04-20

## 2018-04-19 RX ORDER — AMLODIPINE BESYLATE 5 MG/1
5 TABLET ORAL NIGHTLY
Status: DISCONTINUED | OUTPATIENT
Start: 2018-04-19 | End: 2018-04-20

## 2018-04-19 RX ORDER — 0.9 % SODIUM CHLORIDE 0.9 %
VIAL (ML) INJECTION
Status: COMPLETED
Start: 2018-04-19 | End: 2018-04-19

## 2018-04-19 RX ORDER — SODIUM CHLORIDE 9 MG/ML
INJECTION, SOLUTION INTRAVENOUS CONTINUOUS
Status: DISCONTINUED | OUTPATIENT
Start: 2018-04-19 | End: 2018-04-20

## 2018-04-19 RX ADMIN — CEFAZOLIN SODIUM/WATER 2 G: 2 G/20 ML SYRINGE (ML) INTRAVENOUS at 17:46:00

## 2018-04-19 RX ADMIN — 0.9 % SODIUM CHLORIDE 10 ML: 0.9 % VIAL (ML) INJECTION at 17:46:00

## 2018-04-19 NOTE — PROCEDURES
OPERATION(S) PERFORMED:   1. Dual-chamber pacemaker - Ventricular lead placement. Capping old V lead   2. Chest fluoroscopy.      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 MEASURED DATA: RA and RV lead impedence, sensing and thresholds all stable.     CONCLUSIONS:   1. Status post successful New Ventricular lead placement for dual-chamber pacemaker with a active fixation ri

## 2018-04-19 NOTE — PROGRESS NOTES
Balinda Nails  [id number]      Post procedure/ recovery hand-off report given to [facility]. Patient's vital signs stable, access site dry and intact, no signs and symptoms of bleeding/ hematoma.     Rocky John RN

## 2018-04-20 ENCOUNTER — APPOINTMENT (OUTPATIENT)
Dept: ULTRASOUND IMAGING | Facility: HOSPITAL | Age: 83
End: 2018-04-20
Attending: NURSE PRACTITIONER
Payer: MEDICARE

## 2018-04-20 ENCOUNTER — APPOINTMENT (OUTPATIENT)
Dept: GENERAL RADIOLOGY | Facility: HOSPITAL | Age: 83
End: 2018-04-20
Attending: INTERNAL MEDICINE
Payer: MEDICARE

## 2018-04-20 ENCOUNTER — PRIOR ORIGINAL RECORDS (OUTPATIENT)
Dept: OTHER | Age: 83
End: 2018-04-20

## 2018-04-20 VITALS
SYSTOLIC BLOOD PRESSURE: 123 MMHG | HEIGHT: 65 IN | WEIGHT: 158.19 LBS | OXYGEN SATURATION: 97 % | BODY MASS INDEX: 26.35 KG/M2 | HEART RATE: 87 BPM | DIASTOLIC BLOOD PRESSURE: 77 MMHG | RESPIRATION RATE: 18 BRPM | TEMPERATURE: 98 F

## 2018-04-20 PROBLEM — T82.110A PACEMAKER LEAD MALFUNCTION: Status: ACTIVE | Noted: 2018-04-20

## 2018-04-20 PROCEDURE — 71046 X-RAY EXAM CHEST 2 VIEWS: CPT | Performed by: INTERNAL MEDICINE

## 2018-04-20 PROCEDURE — 93971 EXTREMITY STUDY: CPT | Performed by: NURSE PRACTITIONER

## 2018-04-20 PROCEDURE — 85025 COMPLETE CBC W/AUTO DIFF WBC: CPT | Performed by: NURSE PRACTITIONER

## 2018-04-20 PROCEDURE — 96375 TX/PRO/DX INJ NEW DRUG ADDON: CPT

## 2018-04-20 RX ORDER — SODIUM CHLORIDE 9 MG/ML
INJECTION, SOLUTION INTRAVENOUS
Status: COMPLETED
Start: 2018-04-20 | End: 2018-04-20

## 2018-04-20 RX ADMIN — CEFAZOLIN SODIUM/WATER 2 G: 2 G/20 ML SYRINGE (ML) INTRAVENOUS at 01:54:00

## 2018-04-20 RX ADMIN — SODIUM CHLORIDE 250 ML: 9 INJECTION, SOLUTION INTRAVENOUS at 01:55:00

## 2018-04-20 RX ADMIN — ACETAMINOPHEN 650 MG: 325 TABLET ORAL at 11:59:00

## 2018-04-20 RX ADMIN — LEVOTHYROXINE SODIUM 112 MCG: 112 TABLET ORAL at 06:34:00

## 2018-04-20 NOTE — H&P
[facility] Hospitalist Team  History and Physical     ASSESSMENT / PLAN:   79 yo male with hx hypothyroidism, HTN, OP, PAF, MGUS, lung nodules, skin cancer S/P resection, h/o prostate ca, atrial Fib and SSS S/P PPM who presents for lead revision on his pacer lead revision on his pacer lead. Pt is S/P New Ventricular Lead Placement    Pt states his left leg was itchy last night and the RN applied location after which the leg started to burn. Today they noted his left leg to be slightly red and swollen.  No fever or c stable x 3 years -no further f/up needed   • Pulmonary nodule     new pulm nodule- repeat CT chest in 7/2018   • Sacral fracture (HCC)    • Shingles    • Thrombocytopenia ([address] 4/20/2011   • Visual impairment     glasses        PSH  Past Surgical History: POSSIBLE POLYPECTOMY 59186;  Surgeon: Jenny White MD;  Location: [address] [place] [address]  10/17/2012: [facility] FOR [facility] ANT*      Comment: Procedure: COLONOSCOPY, POSSIBLE BIOPSY,                POSSIBLE POLYPECT results for input(s): NA, K, CL, CO2, BUN, CREATSERUM, GLU, CA, CAION, MG, PHOS in the last 72 hours. No results for input(s): ALT, AST, ALB, AMYLASE, LIPASE, LDH in the last 72 hours.     Invalid input(s): ALPHOS, TBIL, DBIL, TPROT    No results for inp sob    objective  /77 (BP Location: Right arm)   Pulse 87   Temp 97.6 °F (36.4 °C) (Oral)   Resp 18   Ht 5' 5\" (1.651 m)   Wt 158 lb 3.2 oz (71.8 kg)   SpO2 97%   BMI 26.33 kg/m²      Gen: No acute distress, alert and oriented x3  Neck Supple, no JV

## 2018-04-20 NOTE — PLAN OF CARE
CARDIOVASCULAR - ADULT    • Maintains optimal cardiac output and hemodynamic stability Adequate for Discharge    • Absence of cardiac arrhythmias or at baseline Adequate for Discharge        Integumentary status not within defined limits    • Pt's integume

## 2018-04-20 NOTE — TRANSITION NOTE
3rd Degree AV block              s/p  New Ventricular lead placement for dual-chamber pacemaker -SJM              Activity f/u and site care reviewed              cxr reviewed- recent CT scan completed f/u with pcp               interrogation completed

## 2018-04-20 NOTE — PROGRESS NOTES
[facility]    Progress Note    Austyn Mccarty Patient Status:  Outpatient in a Bed    1934 MRN [id number]   Location [facility] Attending Jase Hayes MD   Hosp Day # 0 PCP Refugio Agarwal MD     Subjective:     Meño Talavera Right atrial and 2 right ventricular leads. Moderate cardiomegaly with moderate normal pulmonary vascularity. Marked tortuosity ascending and descending thoracic aorta. Difficult to exclude aneurysmal dilatation of the descending thoracic aorta.  Correlate

## 2018-04-20 NOTE — DISCHARGE SUMMARY
[facility] Hospitalist Discharge Summary   Patient ID:  Nancy Hagan  [id number]  80year old  7/1/1934    Admit date: 4/19/2018  Discharge date: 4/20/2018    Primary Care Physician: Alonzo Butterfield MD   Attending Physician: Aleksandar Smith MD   Consults:   Governor See to home, see below for details     S/P New Ventricular Lead Placement for DDD Pacer with CHB  -CXR without PTX  -S/P interrogation-normal functioning  -wound care/activity per cards  -follow up Pacer clinic 1 week with Dr Roslyn Lennox 4 weeks     Atrial Fibrillat There is a new right ventricular lead. No pneumothorax. 9.3 x 7.7 mm sized indeterminate nodule left lung base. See above discussion. Followup chest x-ray or CT scan is advised.      Dictated by (CST): Marta Brooks MD on 4/19/2018 at 12:45     Approved by to ask questions and state understand and agree with therapeutic plan as outlined    Alen Rod RN, NP   [facility] Hospitalist Team  Pager [phone]  Answering Service number: [phone]  4/20/2018      SEE ATTENDING NOTE BELOW      Patient seen and exami

## 2018-04-26 ENCOUNTER — PRIOR ORIGINAL RECORDS (OUTPATIENT)
Dept: OTHER | Age: 83
End: 2018-04-26

## 2018-05-03 ENCOUNTER — PRIOR ORIGINAL RECORDS (OUTPATIENT)
Dept: OTHER | Age: 83
End: 2018-05-03

## 2018-05-06 ENCOUNTER — HOSPITAL ENCOUNTER (EMERGENCY)
Facility: HOSPITAL | Age: 83
Discharge: HOME | End: 2018-05-06
Attending: EMERGENCY MEDICINE
Payer: MEDICARE

## 2018-05-06 ENCOUNTER — PRIOR ORIGINAL RECORDS (OUTPATIENT)
Dept: OTHER | Age: 83
End: 2018-05-06

## 2018-05-06 VITALS
BODY MASS INDEX: 26.66 KG/M2 | WEIGHT: 160 LBS | TEMPERATURE: 98 F | OXYGEN SATURATION: 98 % | HEART RATE: 72 BPM | HEIGHT: 65 IN | SYSTOLIC BLOOD PRESSURE: 127 MMHG | DIASTOLIC BLOOD PRESSURE: 87 MMHG | RESPIRATION RATE: 16 BRPM

## 2018-05-06 DIAGNOSIS — L76.32 POSTOPERATIVE HEMATOMA OF SUBCUTANEOUS TISSUE FOLLOWING NON-DERMATOLOGIC PROCEDURE: Primary | ICD-10-CM

## 2018-05-06 PROCEDURE — 12001 RPR S/N/AX/GEN/TRNK 2.5CM/<: CPT

## 2018-05-06 PROCEDURE — 99283 EMERGENCY DEPT VISIT LOW MDM: CPT

## 2018-05-06 NOTE — ED PROVIDER NOTES
Patient Seen in: [facility] AND [facility] Emergency Department    History   Patient presents with:  Bleeding (hematologic)    Stated Complaint: new pacemaker site bleeding    HPI    Patient is an 51-year-old man on Xarelto who had a pacemaker revision about 2 w 4/2017 9/20/2017: COLONOSCOPY N/A      Comment: Procedure: COLONOSCOPY, POSSIBLE BIOPSY,                POSSIBLE POLYPECTOMY 50967;  Surgeon: Rae Reis MD;  Location: [address] [place] [address]  9/12/2011: COLONOSCOPY,BIOPSY      Comment: P Comment: Performed by LUDA MANUEL at [address]        Smoking status: Former Smoker                                                              Packs/day: 0.00      Years: 10.00     Smokeless tobacco: Never Used left upper chest has some ecchymosis around a small hematoma in the pocket. There is a tiny drop of dark maroon blood from the superior aspect of the wound. Psychiatric: Normal mood and affect.  Behavior is normal. Judgment and thought content normal.   N

## 2018-05-06 NOTE — ED INITIAL ASSESSMENT (HPI)
Pt had his pacemaker repaired 2 weeks ago and on Thursday started to bleed. Pt was seen on Thursday and started to antibiotic.

## 2018-05-09 ENCOUNTER — PRIOR ORIGINAL RECORDS (OUTPATIENT)
Dept: OTHER | Age: 83
End: 2018-05-09

## 2018-05-10 ENCOUNTER — OFFICE VISIT (OUTPATIENT)
Dept: OTOLARYNGOLOGY | Facility: CLINIC | Age: 83
End: 2018-05-10

## 2018-05-10 VITALS
DIASTOLIC BLOOD PRESSURE: 83 MMHG | TEMPERATURE: 99 F | HEIGHT: 65 IN | BODY MASS INDEX: 26.66 KG/M2 | SYSTOLIC BLOOD PRESSURE: 121 MMHG | WEIGHT: 160 LBS

## 2018-05-10 DIAGNOSIS — H61.23 BILATERAL IMPACTED CERUMEN: Primary | ICD-10-CM

## 2018-05-10 PROCEDURE — 69210 REMOVE IMPACTED EAR WAX UNI: CPT | Performed by: OTOLARYNGOLOGY

## 2018-05-10 NOTE — PROGRESS NOTES
Francisco Kay is a 80year old male.   Patient presents with:  Ear Problem: Bilateral ear cleaning, no pain, wax build up in both ears      HISTORY OF PRESENT ILLNESS    Patient presents for cerumen removal. No other complaints or concerns at this time    Soc new pulm nodule- repeat CT chest in 7/2018   • Sacral fracture [facility])    • Shingles    • Thrombocytopenia ([facility] [address] [address] 4/20/2011   • Visual impairment     glasses       Past Surgical History:  No date: ABLATION      Comment: a-fib ablation -> 2008  No date: BACK S Location: [address] [place] [address]  10/17/2012: PATIENT [place] PREOPERATIVE ORDER FOR IV ANT*      Comment: Procedure: COLONOSCOPY, POSSIBLE BIOPSY,                POSSIBLE POLYPECTOMY 84478;  Surgeon: Kely López MD;  Location: [facility] via otomicroscopy. Left TM examined via otomicroscopy. PROCEDURE:    Removal of cerumen impaction   The cerumen impaction was completely removed using microscopy. Removal was completed by using acurette and/or suction.    Comments: Return to clini

## 2018-05-16 LAB
CHOLESTEROL, TOTAL: 144 MG/DL
GLUCOSE: 100 MG/DL
HDL CHOLESTEROL: 47 MG/DL
HEMATOCRIT: 42 %
HEMOGLOBIN: 14.3 G/DL
LDL CHOLESTEROL: 86 MG/DL
PLATELETS: 105 K/UL
RED BLOOD COUNT: 4.47 X 10-6/U
TRIGLYCERIDES: 56 MG/DL
WHITE BLOOD COUNT: 5.1 X 10-3/U

## 2018-05-18 ENCOUNTER — PRIOR ORIGINAL RECORDS (OUTPATIENT)
Dept: OTHER | Age: 83
End: 2018-05-18

## 2018-05-24 ENCOUNTER — OFFICE VISIT (OUTPATIENT)
Dept: AUDIOLOGY | Facility: CLINIC | Age: 83
End: 2018-05-24

## 2018-05-24 DIAGNOSIS — H90.3 SENSORINEURAL HEARING LOSS, BILATERAL: Primary | ICD-10-CM

## 2018-05-24 PROCEDURE — 92593 HEARING AID CHECK, BOTH EARS: CPT | Performed by: AUDIOLOGIST

## 2018-05-24 PROCEDURE — V5266 BATTERY FOR HEARING DEVICE: HCPCS | Performed by: AUDIOLOGIST

## 2018-05-24 NOTE — PROGRESS NOTES
HEARING AID FOLLOW-UP    Mireille Allison  7/1/1934  [id number]    Patient is here for follow-up. Pt recently saw Dr Jame Jensen for an [address] [place] [address] Place. Dr Jame Jensen recommended cleanings every six months saying there was excessive cerumen.     In office the following act

## 2018-06-01 ENCOUNTER — PRIOR ORIGINAL RECORDS (OUTPATIENT)
Dept: OTHER | Age: 83
End: 2018-06-01

## 2018-07-12 ENCOUNTER — PRIOR ORIGINAL RECORDS (OUTPATIENT)
Dept: OTHER | Age: 83
End: 2018-07-12

## 2018-07-12 ENCOUNTER — MYAURORA ACCOUNT LINK (OUTPATIENT)
Dept: OTHER | Age: 83
End: 2018-07-12

## 2018-07-22 ENCOUNTER — HOSPITAL ENCOUNTER (OUTPATIENT)
Age: 83
Discharge: HOME | End: 2018-07-22
Attending: FAMILY MEDICINE
Payer: MEDICARE

## 2018-07-22 VITALS
RESPIRATION RATE: 18 BRPM | HEART RATE: 70 BPM | BODY MASS INDEX: 26.66 KG/M2 | HEIGHT: 65 IN | WEIGHT: 160 LBS | TEMPERATURE: 98 F | DIASTOLIC BLOOD PRESSURE: 75 MMHG | OXYGEN SATURATION: 100 % | SYSTOLIC BLOOD PRESSURE: 133 MMHG

## 2018-07-22 DIAGNOSIS — H11.31 SUBCONJUNCTIVAL HEMORRHAGE OF RIGHT EYE: Primary | ICD-10-CM

## 2018-07-22 PROCEDURE — 99213 OFFICE O/P EST LOW 20 MIN: CPT

## 2018-07-22 PROCEDURE — 99212 OFFICE O/P EST SF 10 MIN: CPT

## 2018-07-22 NOTE — ED PROVIDER NOTES
Patient Seen in: [facility]    History   Patient presents with:  Eye Problem    Stated Complaint: Rt Eye Redness    HPI    Eye injuries. Denies any eye pain. Denies any new changes in the vision.   Denies any eye drainag COLONOSCOPY,BIOPSY      Comment: Performed by LUDA MANUEL at [address]  10/17/2012: COLONOSCOPY,BIOPSY      Comment: Procedure: COLONOSCOPY, POSSIBLE BIOPSY,                POSSIBLE POLYPECTOMY 97539;  Surgeon: Alberto Groves Packs/day: 0.00      Years: 10.00     Smokeless tobacco: Never Used                      Alcohol use: Yes           0.0 oz/week     Comment: 2 beers a month      Review of Systems    Positive for stated complaint: Rt Eye Redness  Other syst Adri Eden [id number] [phone]      As needed, If symptoms worsen

## 2018-07-22 NOTE — ED INITIAL ASSESSMENT (HPI)
Patient with right eye redness starting friday that has gotten progressively worse. Denies trauma/injury. Denies blurred vision. Patient on xarelto.

## 2018-07-26 ENCOUNTER — OFFICE VISIT (OUTPATIENT)
Dept: AUDIOLOGY | Facility: CLINIC | Age: 83
End: 2018-07-26
Payer: MEDICARE

## 2018-07-26 DIAGNOSIS — H90.3 SENSORINEURAL HEARING LOSS, BILATERAL: Primary | ICD-10-CM

## 2018-07-26 PROCEDURE — 92593 HEARING AID CHECK, BOTH EARS: CPT | Performed by: AUDIOLOGIST

## 2018-07-26 NOTE — PROGRESS NOTES
HEARING AID FOLLOW-UP    Mireille Villanueva  7/1/1934  [id number]    Patient is a long-time hearing aid user who has noted increased frustration with his current aids. Pt expressed interest in trying new technolgy.   In the recent past, pt tried [employer] V90

## 2018-08-02 ENCOUNTER — OFFICE VISIT (OUTPATIENT)
Dept: AUDIOLOGY | Facility: CLINIC | Age: 83
End: 2018-08-02

## 2018-08-02 DIAGNOSIS — H90.3 SENSORINEURAL HEARING LOSS, BILATERAL: Primary | ICD-10-CM

## 2018-08-02 PROCEDURE — V5261 HEARING AID, DIGIT, BIN, BTE: HCPCS | Performed by: AUDIOLOGIST

## 2018-08-02 NOTE — PROGRESS NOTES
Hearing Aid Fitting    Mireille Chance purchased two hearing aids. PT is a long-time user. Pt was accompanied by his wife and daughter, Marleen Hooper.     Hearing Aid Information       Right    Left   Make: Oticon Make: Oticon   Model: OPN 2 RITE T Model: OPN 2 RITE

## 2018-08-03 ENCOUNTER — TELEPHONE (OUTPATIENT)
Dept: AUDIOLOGY | Facility: CLINIC | Age: 83
End: 2018-08-03

## 2018-08-14 ENCOUNTER — PRIOR ORIGINAL RECORDS (OUTPATIENT)
Dept: OTHER | Age: 83
End: 2018-08-14

## 2018-08-23 ENCOUNTER — OFFICE VISIT (OUTPATIENT)
Dept: AUDIOLOGY | Facility: CLINIC | Age: 83
End: 2018-08-23
Payer: MEDICARE

## 2018-08-23 DIAGNOSIS — H90.3 SENSORINEURAL HEARING LOSS, BILATERAL: Primary | ICD-10-CM

## 2018-08-23 PROCEDURE — 81001 URINALYSIS AUTO W/SCOPE: CPT | Performed by: INTERNAL MEDICINE

## 2018-08-23 PROCEDURE — 84165 PROTEIN E-PHORESIS SERUM: CPT | Performed by: INTERNAL MEDICINE

## 2018-08-23 PROCEDURE — 83883 ASSAY NEPHELOMETRY NOT SPEC: CPT | Performed by: INTERNAL MEDICINE

## 2018-08-23 PROCEDURE — 92593 HEARING AID CHECK, BOTH EARS: CPT | Performed by: AUDIOLOGIST

## 2018-08-23 PROCEDURE — 86334 IMMUNOFIX E-PHORESIS SERUM: CPT | Performed by: INTERNAL MEDICINE

## 2018-08-28 ENCOUNTER — PRIOR ORIGINAL RECORDS (OUTPATIENT)
Dept: OTHER | Age: 83
End: 2018-08-28

## 2018-08-28 NOTE — PROGRESS NOTES
HEARING AID FOLLOW-UP    Mireille Toro  7/1/1934  [id number]    Patient has elected to [facility] the Oticon devices due to lack of perceived benefit. Pt will continue to use his devices and will consider new technology in the future if he has need.     RV 6mos, ca

## 2018-09-05 LAB
BILIRUBIN TOTAL: 1.09 MG/DL
BUN: 24 MG/DL
BUN: 30 MG/DL
CALCIUM: 9.4 MG/DL
CHLORIDE: 105 MEQ/L
CREATININE, SERUM: 1.17 MG/DL
CREATININE, SERUM: 1.29 MG/DL
GLUCOSE: 128 MG/DL
HEMATOCRIT: 40.2 %
HEMOGLOBIN: 13.1 G/DL
PLATELETS: 140 K/UL
POTASSIUM, SERUM: 4.8 MEQ/L
RED BLOOD COUNT: 4.18 X 10-6/U
SGOT (AST): 25 IU/L
SGPT (ALT): 17 IU/L
SODIUM: 138 MEQ/L
WHITE BLOOD COUNT: 4.75 X 10-3/U

## 2018-09-07 ENCOUNTER — MYAURORA ACCOUNT LINK (OUTPATIENT)
Dept: OTHER | Age: 83
End: 2018-09-07

## 2018-09-07 ENCOUNTER — PRIOR ORIGINAL RECORDS (OUTPATIENT)
Dept: OTHER | Age: 83
End: 2018-09-07

## 2018-09-10 ENCOUNTER — PRIOR ORIGINAL RECORDS (OUTPATIENT)
Dept: OTHER | Age: 83
End: 2018-09-10

## 2018-09-14 PROCEDURE — 83921 ORGANIC ACID SINGLE QUANT: CPT | Performed by: INTERNAL MEDICINE

## 2018-09-27 ENCOUNTER — MYAURORA ACCOUNT LINK (OUTPATIENT)
Dept: OTHER | Age: 83
End: 2018-09-27

## 2018-11-13 ENCOUNTER — LAB ENCOUNTER (OUTPATIENT)
Dept: LAB | Facility: HOSPITAL | Age: 83
End: 2018-11-13
Attending: INTERNAL MEDICINE
Payer: MEDICARE

## 2018-11-13 ENCOUNTER — PRIOR ORIGINAL RECORDS (OUTPATIENT)
Dept: OTHER | Age: 83
End: 2018-11-13

## 2018-11-13 DIAGNOSIS — I25.10 CAD (CORONARY ARTERY DISEASE): Primary | ICD-10-CM

## 2018-11-13 PROCEDURE — 36415 COLL VENOUS BLD VENIPUNCTURE: CPT

## 2018-11-13 PROCEDURE — 80076 HEPATIC FUNCTION PANEL: CPT

## 2018-11-14 LAB
ALBUMIN: 3.4 G/DL
ALKALINE PHOSPHATATE(ALK PHOS): 50 IU/L
BILIRUBIN TOTAL: 1.3 MG/DL
PROTEIN, TOTAL: 6.5 G/DL
SGOT (AST): 24 IU/L
SGPT (ALT): 14 IU/L

## 2018-11-19 ENCOUNTER — PRIOR ORIGINAL RECORDS (OUTPATIENT)
Dept: OTHER | Age: 83
End: 2018-11-19

## 2018-11-27 ENCOUNTER — OFFICE VISIT (OUTPATIENT)
Dept: OTOLARYNGOLOGY | Facility: CLINIC | Age: 83
End: 2018-11-27
Payer: MEDICARE

## 2018-11-27 VITALS
TEMPERATURE: 99 F | DIASTOLIC BLOOD PRESSURE: 76 MMHG | WEIGHT: 160 LBS | SYSTOLIC BLOOD PRESSURE: 126 MMHG | HEIGHT: 65 IN | BODY MASS INDEX: 26.66 KG/M2

## 2018-11-27 DIAGNOSIS — H61.23 BILATERAL IMPACTED CERUMEN: Primary | ICD-10-CM

## 2018-11-27 PROCEDURE — 69210 REMOVE IMPACTED EAR WAX UNI: CPT | Performed by: OTOLARYNGOLOGY

## 2018-11-27 NOTE — PROGRESS NOTES
Ori Michaels is a 80year old male.   Patient presents with:  Ear Wax: bilateral ear cleaning       HISTORY OF PRESENT ILLNESS    Patient presents for cerumen removal. No other complaints or concerns at this time    Social History    Socioeconomic History needed   • Pulmonary nodule     new pulm nodule- repeat CT chest in 7/2018   • Sacral fracture [facility])    • Shingles    • Thrombocytopenia ([address] 4/20/2011   • Visual impairment     glasses       Past Surgical History:   Procedure Laterality Date   • ABLATION intolerance. Neuro Negative Tremors. Psych Negative Anxiety and depression. Integumentary Negative Frequent skin infections, pigment change and rash. Hema/Lymph Negative Easy bleeding and easy bruising.            PHYSICAL EXAM    /76   Temp 9 D) 1000 UNITS Oral Tab, Take 2,000 Units by mouth., Disp: , Rfl:   •  denosumab (PROLIA) 60 MG/ML Subcutaneous Solution, Inject 60 mg sub-q every 6 months., Disp: 1 mL, Rfl: prn  •  Calcium Carb-Cholecalciferol (CALCIUM-VITAMIN D3) 600-400 MG-UNIT Oral Cap

## 2018-12-07 ENCOUNTER — PRIOR ORIGINAL RECORDS (OUTPATIENT)
Dept: OTHER | Age: 83
End: 2018-12-07

## 2018-12-07 ENCOUNTER — MYAURORA ACCOUNT LINK (OUTPATIENT)
Dept: OTHER | Age: 83
End: 2018-12-07

## 2019-02-28 VITALS
OXYGEN SATURATION: 96 % | WEIGHT: 159 LBS | HEART RATE: 76 BPM | BODY MASS INDEX: 27.14 KG/M2 | SYSTOLIC BLOOD PRESSURE: 126 MMHG | DIASTOLIC BLOOD PRESSURE: 68 MMHG | HEIGHT: 64 IN

## 2019-02-28 VITALS
BODY MASS INDEX: 27.66 KG/M2 | HEIGHT: 64 IN | WEIGHT: 162 LBS | OXYGEN SATURATION: 99 % | SYSTOLIC BLOOD PRESSURE: 132 MMHG | HEART RATE: 70 BPM | DIASTOLIC BLOOD PRESSURE: 74 MMHG

## 2019-02-28 VITALS
WEIGHT: 161 LBS | HEART RATE: 70 BPM | SYSTOLIC BLOOD PRESSURE: 116 MMHG | DIASTOLIC BLOOD PRESSURE: 64 MMHG | RESPIRATION RATE: 16 BRPM

## 2019-02-28 VITALS
OXYGEN SATURATION: 97 % | HEART RATE: 79 BPM | RESPIRATION RATE: 18 BRPM | HEIGHT: 64 IN | WEIGHT: 157 LBS | SYSTOLIC BLOOD PRESSURE: 144 MMHG | BODY MASS INDEX: 26.8 KG/M2 | DIASTOLIC BLOOD PRESSURE: 88 MMHG

## 2019-02-28 VITALS
WEIGHT: 160 LBS | SYSTOLIC BLOOD PRESSURE: 128 MMHG | HEIGHT: 64 IN | HEART RATE: 80 BPM | DIASTOLIC BLOOD PRESSURE: 82 MMHG | BODY MASS INDEX: 27.31 KG/M2

## 2019-02-28 VITALS
WEIGHT: 154 LBS | OXYGEN SATURATION: 98 % | DIASTOLIC BLOOD PRESSURE: 60 MMHG | HEIGHT: 66 IN | SYSTOLIC BLOOD PRESSURE: 100 MMHG | HEART RATE: 71 BPM | RESPIRATION RATE: 18 BRPM | BODY MASS INDEX: 24.75 KG/M2

## 2019-02-28 VITALS — HEART RATE: 70 BPM | DIASTOLIC BLOOD PRESSURE: 70 MMHG | SYSTOLIC BLOOD PRESSURE: 120 MMHG

## 2019-03-01 VITALS
HEART RATE: 68 BPM | OXYGEN SATURATION: 97 % | RESPIRATION RATE: 10 BRPM | BODY MASS INDEX: 26.03 KG/M2 | HEIGHT: 66 IN | DIASTOLIC BLOOD PRESSURE: 88 MMHG | WEIGHT: 162 LBS | SYSTOLIC BLOOD PRESSURE: 140 MMHG

## 2019-03-01 VITALS
DIASTOLIC BLOOD PRESSURE: 70 MMHG | RESPIRATION RATE: 16 BRPM | WEIGHT: 163 LBS | SYSTOLIC BLOOD PRESSURE: 126 MMHG | HEART RATE: 70 BPM

## 2019-04-11 ENCOUNTER — ANCILLARY PROCEDURE (OUTPATIENT)
Dept: CARDIOLOGY | Age: 84
End: 2019-04-11
Attending: INTERNAL MEDICINE

## 2019-04-11 ENCOUNTER — OFFICE VISIT (OUTPATIENT)
Dept: OTOLARYNGOLOGY | Facility: CLINIC | Age: 84
End: 2019-04-11
Payer: MEDICARE

## 2019-04-11 ENCOUNTER — LAB ENCOUNTER (OUTPATIENT)
Dept: LAB | Facility: HOSPITAL | Age: 84
End: 2019-04-11
Attending: INTERNAL MEDICINE
Payer: MEDICARE

## 2019-04-11 VITALS
TEMPERATURE: 98 F | DIASTOLIC BLOOD PRESSURE: 82 MMHG | BODY MASS INDEX: 26.66 KG/M2 | WEIGHT: 160 LBS | SYSTOLIC BLOOD PRESSURE: 124 MMHG | HEIGHT: 65 IN

## 2019-04-11 DIAGNOSIS — Z79.899 ENCOUNTER FOR LONG-TERM (CURRENT) USE OF OTHER MEDICATIONS: ICD-10-CM

## 2019-04-11 DIAGNOSIS — E78.5 DYSLIPIDEMIA: Primary | ICD-10-CM

## 2019-04-11 DIAGNOSIS — H61.23 BILATERAL IMPACTED CERUMEN: Primary | ICD-10-CM

## 2019-04-11 DIAGNOSIS — Z45.018 PACEMAKER REPROGRAMMING/CHECK: ICD-10-CM

## 2019-04-11 LAB
ALT SERPL-CCNC: 20 U/L
AST SERPL-CCNC: 23 U/L
CHOLEST SERPL-MCNC: 122 MG/DL
CHOLEST/HDLC SERPL: NORMAL {RATIO}
HDLC SERPL-MCNC: 57 MG/DL
LDLC SERPL CALC-MCNC: 56 MG/DL
LENGTH OF FAST TIME PATIENT: NORMAL H
NONHDLC SERPL-MCNC: 65 MG/DL
TRIGL SERPL-MCNC: 43 MG/DL
VLDLC SERPL CALC-MCNC: NORMAL MG/DL

## 2019-04-11 PROCEDURE — 83883 ASSAY NEPHELOMETRY NOT SPEC: CPT | Performed by: INTERNAL MEDICINE

## 2019-04-11 PROCEDURE — 80061 LIPID PANEL: CPT

## 2019-04-11 PROCEDURE — 87086 URINE CULTURE/COLONY COUNT: CPT | Performed by: INTERNAL MEDICINE

## 2019-04-11 PROCEDURE — 86334 IMMUNOFIX E-PHORESIS SERUM: CPT | Performed by: INTERNAL MEDICINE

## 2019-04-11 PROCEDURE — 69210 REMOVE IMPACTED EAR WAX UNI: CPT | Performed by: OTOLARYNGOLOGY

## 2019-04-11 PROCEDURE — 36415 COLL VENOUS BLD VENIPUNCTURE: CPT

## 2019-04-11 PROCEDURE — 81001 URINALYSIS AUTO W/SCOPE: CPT | Performed by: INTERNAL MEDICINE

## 2019-04-11 PROCEDURE — 84460 ALANINE AMINO (ALT) (SGPT): CPT

## 2019-04-11 PROCEDURE — 84165 PROTEIN E-PHORESIS SERUM: CPT | Performed by: INTERNAL MEDICINE

## 2019-04-11 PROCEDURE — 93294 REM INTERROG EVL PM/LDLS PM: CPT | Performed by: INTERNAL MEDICINE

## 2019-04-11 PROCEDURE — 84450 TRANSFERASE (AST) (SGOT): CPT

## 2019-04-12 ENCOUNTER — CLINICAL ABSTRACT (OUTPATIENT)
Dept: CARDIOLOGY | Age: 84
End: 2019-04-12

## 2019-04-12 ENCOUNTER — TELEPHONE (OUTPATIENT)
Dept: CARDIOLOGY | Age: 84
End: 2019-04-12

## 2019-04-15 ENCOUNTER — OFFICE VISIT (OUTPATIENT)
Dept: AUDIOLOGY | Facility: CLINIC | Age: 84
End: 2019-04-15
Payer: MEDICARE

## 2019-04-15 DIAGNOSIS — H90.3 SENSORINEURAL HEARING LOSS, BILATERAL: Primary | ICD-10-CM

## 2019-04-15 PROCEDURE — 92593 HEARING AID CHECK, BOTH EARS: CPT | Performed by: AUDIOLOGIST

## 2019-04-15 RX ORDER — AMLODIPINE BESYLATE 5 MG/1
TABLET ORAL
COMMUNITY
End: 2021-04-08

## 2019-04-15 RX ORDER — LEVOTHYROXINE SODIUM 112 UG/1
TABLET ORAL
COMMUNITY
End: 2020-12-04 | Stop reason: CLARIF

## 2019-04-15 RX ORDER — ATORVASTATIN CALCIUM 20 MG/1
TABLET, FILM COATED ORAL
COMMUNITY
End: 2019-12-06

## 2019-04-15 RX ORDER — IBUPROFEN 200 MG
CAPSULE ORAL
COMMUNITY

## 2019-04-15 RX ORDER — RIVAROXABAN 15 MG/1
TABLET, FILM COATED ORAL
COMMUNITY
End: 2019-06-03 | Stop reason: SDUPTHER

## 2019-04-15 NOTE — PROGRESS NOTES
HEARING AID FOLLOW-UP    Mireille Allison  7/1/1934  [id number]    Patient is here for hearing aid check. Patient is here with wife, Danuta Amaya.       Hearing aid Information:   [employer]on[employer] [place] Micro M  Coupled to slim tube and slim tip mold  Right #[id number]  Terence Moore

## 2019-05-03 ENCOUNTER — OFFICE VISIT (OUTPATIENT)
Dept: AUDIOLOGY | Facility: CLINIC | Age: 84
End: 2019-05-03

## 2019-05-03 DIAGNOSIS — H90.3 SENSORINEURAL HEARING LOSS, BILATERAL: Primary | ICD-10-CM

## 2019-05-03 PROCEDURE — V5261 HEARING AID, DIGIT, BIN, BTE: HCPCS | Performed by: AUDIOLOGIST

## 2019-05-03 NOTE — PROGRESS NOTES
Hearing Aid Fitting    Mireille Hastings purchased two hearing aids. The hearing aid fitting was completed by Melvin David       Hearing Aid Information    Ana Days M90-R  Right #[id number]  Left #[id number]  Coupled to custom c-shells Right #19

## 2019-05-06 ENCOUNTER — TELEPHONE (OUTPATIENT)
Dept: AUDIOLOGY | Facility: CLINIC | Age: 84
End: 2019-05-06

## 2019-05-06 NOTE — TELEPHONE ENCOUNTER
Spoke with patient's wife. Advised they check wax guard. Patient changed wax guard while I was on phone with them and hearing aid is working again.     Reminded that wax guard may get plugged at any time and that should be first thing to check if aid is

## 2019-05-15 ENCOUNTER — OFFICE VISIT (OUTPATIENT)
Dept: AUDIOLOGY | Facility: CLINIC | Age: 84
End: 2019-05-15
Payer: MEDICARE

## 2019-05-15 DIAGNOSIS — H90.3 SENSORINEURAL HEARING LOSS, BILATERAL: Primary | ICD-10-CM

## 2019-05-15 PROCEDURE — 92593 HEARING AID CHECK, BOTH EARS: CPT | Performed by: AUDIOLOGIST

## 2019-05-15 NOTE — PROGRESS NOTES
HEARING AID FOLLOW-UP    Mireille Nancy Galvan  7/1/1934  [id number]    Patient is here for first hearing aid follow up.     Hearing Aid Information  Marcela Chawla M90-R  Right #[id number]  Left #[id number]  Coupled to custom c-shells Right #[id number], Left #[id number]

## 2019-05-31 ENCOUNTER — OFFICE VISIT (OUTPATIENT)
Dept: AUDIOLOGY | Facility: CLINIC | Age: 84
End: 2019-05-31
Payer: MEDICARE

## 2019-05-31 DIAGNOSIS — H90.3 SENSORINEURAL HEARING LOSS, BILATERAL: Primary | ICD-10-CM

## 2019-05-31 PROCEDURE — 92593 HEARING AID CHECK, BOTH EARS: CPT | Performed by: AUDIOLOGIST

## 2019-05-31 NOTE — PROGRESS NOTES
HEARING AID FOLLOW-UP    Mireille Young Sharita  7/1/1934  [id number]    Patient is here for  of replacement hearing aids. Both aids were sent back in for  problem (not charging, in \"stuck\" mode)   Lucita replaced both hearing aids.      Hearing

## 2019-06-03 RX ORDER — RIVAROXABAN 15 MG/1
15 TABLET, FILM COATED ORAL
Qty: 90 TABLET | Refills: 1 | Status: SHIPPED | OUTPATIENT
Start: 2019-06-03 | End: 2019-11-27 | Stop reason: SDUPTHER

## 2019-06-19 ENCOUNTER — OFFICE VISIT (OUTPATIENT)
Dept: AUDIOLOGY | Facility: CLINIC | Age: 84
End: 2019-06-19

## 2019-06-19 DIAGNOSIS — H90.3 SENSORINEURAL HEARING LOSS, BILATERAL: Primary | ICD-10-CM

## 2019-06-19 PROCEDURE — 92593 HEARING AID CHECK, BOTH EARS: CPT | Performed by: AUDIOLOGIST

## 2019-06-21 NOTE — PROGRESS NOTES
HEARING AID FOLLOW-UP    Mireille Allison  7/1/1934  [id number]    Patient is here for follow up after new hearing aid fitting.       Hearing Aid Information  Flavia Benitez M90-R  Right #[id number] ----replaced with #[id number]   Left #[id number]----jcvedhch wi

## 2019-06-25 ENCOUNTER — OFFICE VISIT (OUTPATIENT)
Dept: PHYSICAL THERAPY | Facility: HOSPITAL | Age: 84
End: 2019-06-25
Attending: ORTHOPAEDIC SURGERY
Payer: MEDICARE

## 2019-06-25 DIAGNOSIS — M17.11 UNILATERAL PRIMARY OSTEOARTHRITIS, RIGHT KNEE: ICD-10-CM

## 2019-06-25 PROCEDURE — 97162 PT EVAL MOD COMPLEX 30 MIN: CPT

## 2019-06-25 PROCEDURE — 97110 THERAPEUTIC EXERCISES: CPT

## 2019-06-25 NOTE — PROGRESS NOTES
LOWER EXTREMITY EVALUATION:   Referring Physician: Dr. Latisha Al  Date of Onset: 2-3 months ago Date of Service: 6/25/2019   Diagnosis: Unilateral primary osteoarthritis; right knee  PATIENT SUMMARY:   Aguila Solorzano is a 80year old y/o male who presents to  Flexion: R 4/5; L 4/5  Extension: R 3+/5; L 4-/5  Abduction: R 3+/5; L 3/5   Flexion: R 4-/5; L 4/5  Extension: R 4/5; L 4+/5    DF: R 5/5; L 5/5       AROM:  R knee: 3-105 deg  L knee: 0-120 deg    Accessory motion: hypomobility R patellar superior and referral. Please co-sign or sign and return this letter via fax as soon as possible to [phone].  If you have any questions, please contact me at Dept: [phone]    Sincerely,  Electronically signed by therapist: Tara Pretty PT    Physician's certifica

## 2019-07-02 ENCOUNTER — OFFICE VISIT (OUTPATIENT)
Dept: PHYSICAL THERAPY | Facility: HOSPITAL | Age: 84
End: 2019-07-02
Attending: ORTHOPAEDIC SURGERY
Payer: MEDICARE

## 2019-07-02 PROCEDURE — 97110 THERAPEUTIC EXERCISES: CPT

## 2019-07-02 NOTE — PROGRESS NOTES
Dx:  Unilateral primary osteoarthritis; right knee     Authorized # of Visits:  10 ([address])         Next MD visit: none scheduled  Fall Risk: standard         Precautions: n/a           Medication Changes since last visit?: No  Subjective: Patient reports he h

## 2019-07-09 ENCOUNTER — OFFICE VISIT (OUTPATIENT)
Dept: PHYSICAL THERAPY | Facility: HOSPITAL | Age: 84
End: 2019-07-09
Attending: ORTHOPAEDIC SURGERY
Payer: MEDICARE

## 2019-07-09 PROCEDURE — 97110 THERAPEUTIC EXERCISES: CPT

## 2019-07-09 NOTE — PROGRESS NOTES
Dx:  Unilateral primary osteoarthritis; right knee     Authorized # of Visits:  10 [facility])         Next MD visit: none scheduled  Fall Risk: standard         Precautions: anticoagulants, pacemaker, osteoporosis            Medication Changes since last visit?: Time: 45 min

## 2019-07-11 ENCOUNTER — ANCILLARY PROCEDURE (OUTPATIENT)
Dept: CARDIOLOGY | Age: 84
End: 2019-07-11
Attending: INTERNAL MEDICINE

## 2019-07-11 ENCOUNTER — OFFICE VISIT (OUTPATIENT)
Dept: PHYSICAL THERAPY | Facility: HOSPITAL | Age: 84
End: 2019-07-11
Attending: ORTHOPAEDIC SURGERY
Payer: MEDICARE

## 2019-07-11 VITALS
HEART RATE: 70 BPM | RESPIRATION RATE: 16 BRPM | DIASTOLIC BLOOD PRESSURE: 66 MMHG | WEIGHT: 160 LBS | BODY MASS INDEX: 27.46 KG/M2 | SYSTOLIC BLOOD PRESSURE: 118 MMHG

## 2019-07-11 DIAGNOSIS — Z45.018 PACEMAKER REPROGRAMMING/CHECK: Primary | ICD-10-CM

## 2019-07-11 PROCEDURE — 97110 THERAPEUTIC EXERCISES: CPT

## 2019-07-11 PROCEDURE — 93279 PRGRMG DEV EVAL PM/LDLS PM: CPT | Performed by: INTERNAL MEDICINE

## 2019-07-11 NOTE — PROGRESS NOTES
Dx:  Unilateral primary osteoarthritis; right knee     Authorized # of Visits:  10 [facility])         Next MD visit: none scheduled  Fall Risk: standard         Precautions: anticoagulants, pacemaker, osteoporosis            Medication Changes since last visit?: was instructed in proper way to run without straining the medial knee and was able to return demo. Pt is motivated and compliant. Goals     • Therapy Goals      1. Patient will be independent with HEP and it's progression.   2. Patient will demo R knee

## 2019-07-16 ENCOUNTER — OFFICE VISIT (OUTPATIENT)
Dept: PHYSICAL THERAPY | Facility: HOSPITAL | Age: 84
End: 2019-07-16
Attending: ORTHOPAEDIC SURGERY
Payer: MEDICARE

## 2019-07-16 PROCEDURE — 97110 THERAPEUTIC EXERCISES: CPT

## 2019-07-16 NOTE — PROGRESS NOTES
Dx:  Unilateral primary osteoarthritis; right knee     Authorized # of Visits:  10 [facility])         Next MD visit: none scheduled  Fall Risk: standard         Precautions: anticoagulants, pacemaker, osteoporosis            Medication Changes since last visit?: 15 5\" hold  -Prone hip ext (pillow under abdomen) 2 x 10 r/l        MT:  Tibial distraction with IR of R knee    HEP: added SLR, seated HS stretch   HEP: HEP: HEP:   Prone hip extension           Assessment: Initiated hip strengthening in prone this sessi

## 2019-07-18 ENCOUNTER — OFFICE VISIT (OUTPATIENT)
Dept: PHYSICAL THERAPY | Facility: HOSPITAL | Age: 84
End: 2019-07-18
Attending: ORTHOPAEDIC SURGERY
Payer: MEDICARE

## 2019-07-18 PROCEDURE — 97110 THERAPEUTIC EXERCISES: CPT

## 2019-07-18 NOTE — PROGRESS NOTES
Dx:  Unilateral primary osteoarthritis; right knee     Authorized # of Visits:  10 [facility])         Next MD visit: none scheduled  Fall Risk: standard         Precautions: anticoagulants, pacemaker, osteoporosis            Medication Changes since last visit?: Advanced closed chain activity with no increased pain reported. Fair control with step downs. Goals     • Therapy Goals      1. Patient will be independent with HEP and it's progression.   2. Patient will demo R knee AROM at least 0-115 deg to be able

## 2019-07-23 ENCOUNTER — OFFICE VISIT (OUTPATIENT)
Dept: PHYSICAL THERAPY | Facility: HOSPITAL | Age: 84
End: 2019-07-23
Attending: ORTHOPAEDIC SURGERY
Payer: MEDICARE

## 2019-07-23 PROCEDURE — 97110 THERAPEUTIC EXERCISES: CPT

## 2019-07-23 NOTE — PROGRESS NOTES
Dx:  Unilateral primary osteoarthritis; right knee     Authorized # of Visits:  10 [facility])         Next MD visit: none scheduled  Fall Risk: standard         Precautions: anticoagulants, pacemaker, osteoporosis            Medication Changes since last visit?: up 6\" 2 x 10  -Fwd step up 6\" 2 x 10  Airex mini squat 2 x 15  -RTB side step in // bars x 6 laps  -Standing hip ext RTB 2 x 10 r/l  B heel raises 2 x 10   -Bridge with ball squeeze 5\" hold 2 x 15   -SAQ with towel squeeze 10\" x 15 with 2.5# r/l   -Cla

## 2019-07-25 ENCOUNTER — OFFICE VISIT (OUTPATIENT)
Dept: PHYSICAL THERAPY | Facility: HOSPITAL | Age: 84
End: 2019-07-25
Attending: ORTHOPAEDIC SURGERY
Payer: MEDICARE

## 2019-07-25 PROCEDURE — 97110 THERAPEUTIC EXERCISES: CPT

## 2019-07-30 ENCOUNTER — TELEPHONE (OUTPATIENT)
Dept: AUDIOLOGY | Facility: CLINIC | Age: 84
End: 2019-07-30

## 2019-07-30 NOTE — TELEPHONE ENCOUNTER
pts wife is asking for assistance in the refund process for HA. States they have been waiting over a month and was told it would take another 4 weeks for their refund check.  pls advise thank you

## 2019-07-30 NOTE — TELEPHONE ENCOUNTER
Hockley back from [employer] that patient's refund check was cut today and will be in the mail on Friday. Called patient and told this information and to watch for check in mail next week.

## 2019-07-30 NOTE — TELEPHONE ENCOUNTER
Spoke with wife. Let her know I have reached out to two people Mirtha Aguayo and Mine Cota)  in billing but have not heard anything yet. Not sure if there is anything I can do, but will let her know if I hear anything.

## 2019-08-05 LAB
ALBUMIN SERPL-MCNC: 3.2 G/DL
ALBUMIN/GLOB SERPL: NORMAL {RATIO}
ALP SERPL-CCNC: 49 U/L
ALT SERPL-CCNC: 19 U/L
ANION GAP SERPL CALC-SCNC: NORMAL MMOL/L
AST SERPL-CCNC: 22 U/L
BILIRUB SERPL-MCNC: 1.13 MG/DL
BUN SERPL-MCNC: 23 MG/DL
BUN/CREAT SERPL: NORMAL
CALCIUM SERPL-MCNC: 9.2 MG/DL
CHLORIDE SERPL-SCNC: 106 MMOL/L
CO2 SERPL-SCNC: 24.6 MMOL/L
CREAT SERPL-MCNC: 0.99 MG/DL
ERYTHROCYTE [DISTWIDTH] IN BLOOD: NORMAL %
GLOBULIN SER-MCNC: NORMAL G/DL
GLUCOSE SERPL-MCNC: 108 MG/DL
HCT VFR BLD CALC: 38.1 %
HGB BLD-MCNC: 12.4 G/DL
LENGTH OF FAST TIME PATIENT: NORMAL H
MCH RBC QN AUTO: NORMAL PG
MCHC RBC AUTO-ENTMCNC: NORMAL G/DL
MCV RBC AUTO: NORMAL FL
MPV (OFFPRE2): NORMAL
PLATELET # BLD: 136 10*3/UL
POTASSIUM SERPL-SCNC: 4.2 MMOL/L
PROT SERPL-MCNC: 6.5 G/DL
RBC # BLD: 4.01 10*6/UL
SODIUM SERPL-SCNC: 138 MMOL/L
WBC # BLD: 4.73 10*3/UL

## 2019-08-05 PROCEDURE — 83883 ASSAY NEPHELOMETRY NOT SPEC: CPT | Performed by: INTERNAL MEDICINE

## 2019-08-05 PROCEDURE — 84165 PROTEIN E-PHORESIS SERUM: CPT | Performed by: INTERNAL MEDICINE

## 2019-08-05 PROCEDURE — 86334 IMMUNOFIX E-PHORESIS SERUM: CPT | Performed by: INTERNAL MEDICINE

## 2019-08-27 ENCOUNTER — OFFICE VISIT (OUTPATIENT)
Dept: OTOLARYNGOLOGY | Facility: CLINIC | Age: 84
End: 2019-08-27
Payer: MEDICARE

## 2019-08-27 VITALS — HEART RATE: 76 BPM | TEMPERATURE: 98 F | SYSTOLIC BLOOD PRESSURE: 122 MMHG | DIASTOLIC BLOOD PRESSURE: 74 MMHG

## 2019-08-27 DIAGNOSIS — H61.23 BILATERAL IMPACTED CERUMEN: Primary | ICD-10-CM

## 2019-08-27 PROCEDURE — 69210 REMOVE IMPACTED EAR WAX UNI: CPT | Performed by: OTOLARYNGOLOGY

## 2019-08-27 NOTE — PROGRESS NOTES
Lucía Watson is a 80year old male. No chief complaint on file.       HISTORY OF PRESENT ILLNESS    Patient presents for cerumen removal. No other complaints or concerns at this time    Social History    Socioeconomic History      Marital status:  Pulmonary nodule     new pulm nodule- repeat CT chest in 7/2018   • Sacral fracture [facility])    • Shingles    • Thrombocytopenia ([address] 4/20/2011   • Visual impairment     glasses       Past Surgical History:   Procedure Laterality Date   • ABLATION      a-fib Neuro Negative Tremors. Psych Negative Anxiety and depression. Integumentary Negative Frequent skin infections, pigment change and rash. Hema/Lymph Negative Easy bleeding and easy bruising. PHYSICAL EXAM    /74   Pulse 76   Temp 97. MG/ML Subcutaneous Solution, Inject 60 mg sub-q every 6 months., Disp: 1 mL, Rfl: prn  •  Calcium Carb-Cholecalciferol (CALCIUM-VITAMIN D3) 600-400 MG-UNIT Oral Cap, Take  by mouth daily. , Disp: , Rfl:   ASSESSMENT AND PLAN    1.  Bilateral impacted cerumen

## 2019-09-04 PROBLEM — I25.10 CAD (CORONARY ARTERY DISEASE): Status: ACTIVE | Noted: 2019-09-04

## 2019-09-04 PROBLEM — I44.30 AV BLOCK: Status: ACTIVE | Noted: 2019-09-04

## 2019-09-04 PROBLEM — E78.00 HYPERCHOLESTEREMIA: Status: ACTIVE | Noted: 2019-09-04

## 2019-09-04 PROBLEM — I48.91 ATRIAL FIBRILLATION STATUS POST CARDIOVERSION (CMD): Status: ACTIVE | Noted: 2019-09-04

## 2019-09-04 PROBLEM — Z95.0 CARDIAC PACEMAKER IN SITU: Status: ACTIVE | Noted: 2019-09-04

## 2019-09-06 ENCOUNTER — OFFICE VISIT (OUTPATIENT)
Dept: CARDIOLOGY | Age: 84
End: 2019-09-06

## 2019-09-06 VITALS
OXYGEN SATURATION: 98 % | BODY MASS INDEX: 26.66 KG/M2 | HEART RATE: 68 BPM | HEIGHT: 65 IN | WEIGHT: 160 LBS | SYSTOLIC BLOOD PRESSURE: 120 MMHG | DIASTOLIC BLOOD PRESSURE: 60 MMHG

## 2019-09-06 DIAGNOSIS — E78.00 HYPERCHOLESTEREMIA: ICD-10-CM

## 2019-09-06 DIAGNOSIS — I25.10 CORONARY ARTERY DISEASE INVOLVING NATIVE CORONARY ARTERY OF NATIVE HEART WITHOUT ANGINA PECTORIS: Primary | ICD-10-CM

## 2019-09-06 PROCEDURE — 99214 OFFICE O/P EST MOD 30 MIN: CPT | Performed by: INTERNAL MEDICINE

## 2019-09-08 ENCOUNTER — HOSPITAL ENCOUNTER (OUTPATIENT)
Age: 84
Discharge: HOME | End: 2019-09-08
Attending: EMERGENCY MEDICINE
Payer: MEDICARE

## 2019-09-08 ENCOUNTER — APPOINTMENT (OUTPATIENT)
Dept: GENERAL RADIOLOGY | Age: 84
End: 2019-09-08
Attending: EMERGENCY MEDICINE
Payer: MEDICARE

## 2019-09-08 VITALS
DIASTOLIC BLOOD PRESSURE: 71 MMHG | BODY MASS INDEX: 27 KG/M2 | TEMPERATURE: 98 F | RESPIRATION RATE: 18 BRPM | WEIGHT: 164 LBS | OXYGEN SATURATION: 99 % | HEART RATE: 70 BPM | SYSTOLIC BLOOD PRESSURE: 123 MMHG

## 2019-09-08 DIAGNOSIS — M54.9 MUSCULOSKELETAL BACK PAIN: Primary | ICD-10-CM

## 2019-09-08 PROCEDURE — 99213 OFFICE O/P EST LOW 20 MIN: CPT

## 2019-09-08 PROCEDURE — 72100 X-RAY EXAM L-S SPINE 2/3 VWS: CPT | Performed by: EMERGENCY MEDICINE

## 2019-09-08 NOTE — ED PROVIDER NOTES
Patient Seen in: [facility]    History   Patient presents with:  Back Pain (musculoskeletal)    Stated Complaint: back pain    HPI  Patient is here with his wife. Patient complains of low back pain for 2 weeks.   Patient • ESOPHAGOGASTRODUODENOSCOPY, COLONOSCOPY, POSSIBLE BIOPSY, POSSIBLE POLYPECTOMY 12392, 30214 N/A 9/12/2011    Performed by Alex Ruth MD at [address] [place] [address]      Prostatectomy - 2003   • HC KYPHOPLASTY LUMBAR Cardiovascular: Normal rate, regular rhythm, normal heart sounds and intact distal pulses. Pulmonary/Chest: Effort normal and breath sounds normal.   Abdominal: Soft. Bowel sounds are normal. He exhibits no pulsatile midline mass. There is no tenderness. Results of the x-ray were discussed with patient and his wife. No acute changes identified. Strategies for safe ambulation at home and management of pain with Tylenol as discussed. Follow-up with primary care doctor was advised.   If symptoms worsen or n

## 2019-09-08 NOTE — ED INITIAL ASSESSMENT (HPI)
REPORTS LOWER BACK PAIN FOR THE PAST 2 WEEKS AFTER PAINTING AROUND A GARAGE DOOR. C/O LOWER BACK STIFFNESS FROM THAT TIME. PATIENT HAS BEEN AMBULATING WITH A WALKER DUE TO PAIN SINCE Thursday.

## 2019-09-15 ENCOUNTER — APPOINTMENT (OUTPATIENT)
Dept: ULTRASOUND IMAGING | Facility: HOSPITAL | Age: 84
End: 2019-09-15
Attending: EMERGENCY MEDICINE
Payer: MEDICARE

## 2019-09-15 ENCOUNTER — HOSPITAL ENCOUNTER (OUTPATIENT)
Age: 84
Discharge: OTHER INSTITUTION | End: 2019-09-15
Attending: EMERGENCY MEDICINE
Payer: MEDICARE

## 2019-09-15 ENCOUNTER — HOSPITAL ENCOUNTER (EMERGENCY)
Facility: HOSPITAL | Age: 84
Discharge: HOME | End: 2019-09-15
Attending: EMERGENCY MEDICINE
Payer: MEDICARE

## 2019-09-15 ENCOUNTER — APPOINTMENT (OUTPATIENT)
Dept: GENERAL RADIOLOGY | Facility: HOSPITAL | Age: 84
End: 2019-09-15
Attending: EMERGENCY MEDICINE
Payer: MEDICARE

## 2019-09-15 VITALS
TEMPERATURE: 97 F | HEART RATE: 70 BPM | RESPIRATION RATE: 16 BRPM | OXYGEN SATURATION: 99 % | DIASTOLIC BLOOD PRESSURE: 74 MMHG | SYSTOLIC BLOOD PRESSURE: 131 MMHG

## 2019-09-15 VITALS
BODY MASS INDEX: 25.07 KG/M2 | TEMPERATURE: 98 F | DIASTOLIC BLOOD PRESSURE: 90 MMHG | RESPIRATION RATE: 17 BRPM | HEART RATE: 70 BPM | SYSTOLIC BLOOD PRESSURE: 130 MMHG | OXYGEN SATURATION: 96 % | HEIGHT: 68 IN | WEIGHT: 165.38 LBS

## 2019-09-15 DIAGNOSIS — R60.0 PEDAL EDEMA: Primary | ICD-10-CM

## 2019-09-15 DIAGNOSIS — M79.89 LEG SWELLING: Primary | ICD-10-CM

## 2019-09-15 LAB
ABSOLUTE IMMATURE GRANULOCYTES (OFFPRE24): NORMAL
ANION GAP SERPL CALC-SCNC: 5 MMOL/L (ref 0–18)
BASO+EOS+MONOS # BLD: NORMAL 10*3/UL
BASO+EOS+MONOS NFR BLD: NORMAL %
BASOPHILS # BLD AUTO: 0.02 X10(3) UL (ref 0–0.2)
BASOPHILS # BLD: NORMAL 10*3/UL
BASOPHILS NFR BLD AUTO: 0.3 %
BASOPHILS NFR BLD: NORMAL %
BUN BLD-MCNC: 24 MG/DL (ref 7–18)
BUN/CREAT SERPL: 23.8 (ref 10–20)
CALCIUM BLD-MCNC: 8.4 MG/DL (ref 8.5–10.1)
CHLORIDE SERPL-SCNC: 105 MMOL/L (ref 98–112)
CO2 SERPL-SCNC: 28 MMOL/L (ref 21–32)
CREAT BLD-MCNC: 1.01 MG/DL (ref 0.7–1.3)
DEPRECATED RDW RBC AUTO: 48.6 FL (ref 35.1–46.3)
DIFFERENTIAL METHOD BLD: NORMAL
EOSINOPHIL # BLD AUTO: 0.15 X10(3) UL (ref 0–0.7)
EOSINOPHIL # BLD: NORMAL 10*3/UL
EOSINOPHIL NFR BLD AUTO: 2.2 %
EOSINOPHIL NFR BLD: NORMAL %
ERYTHROCYTE [DISTWIDTH] IN BLOOD BY AUTOMATED COUNT: 14 % (ref 11–15)
ERYTHROCYTE [DISTWIDTH] IN BLOOD: NORMAL %
GLUCOSE BLD-MCNC: 96 MG/DL (ref 70–99)
HCT VFR BLD AUTO: 37 % (ref 39–53)
HCT VFR BLD CALC: 37 %
HGB BLD-MCNC: 12.2 G/DL
HGB BLD-MCNC: 12.2 G/DL (ref 13–17.5)
IMM GRANULOCYTES # BLD AUTO: 0.05 X10(3) UL (ref 0–1)
IMM GRANULOCYTES NFR BLD: 0.7 %
IMMATURE GRANULOCYTES (OFFPRE25): NORMAL
LYMPHOCYTES # BLD AUTO: 1.2 X10(3) UL (ref 1–4)
LYMPHOCYTES # BLD: NORMAL 10*3/UL
LYMPHOCYTES NFR BLD AUTO: 18 %
LYMPHOCYTES NFR BLD: NORMAL %
MCH RBC QN AUTO: 31.2 PG (ref 26–34)
MCH RBC QN AUTO: NORMAL PG
MCHC RBC AUTO-ENTMCNC: 33 G/DL (ref 31–37)
MCHC RBC AUTO-ENTMCNC: NORMAL G/DL
MCV RBC AUTO: 94.6 FL (ref 80–100)
MCV RBC AUTO: NORMAL FL
MONOCYTES # BLD AUTO: 0.89 X10(3) UL (ref 0.1–1)
MONOCYTES # BLD: NORMAL 10*3/UL
MONOCYTES NFR BLD AUTO: 13.3 %
MONOCYTES NFR BLD: NORMAL %
MPV (OFFPRE2): NORMAL
NEUTROPHILS # BLD AUTO: 4.36 X10 (3) UL (ref 1.5–7.7)
NEUTROPHILS # BLD AUTO: 4.36 X10(3) UL (ref 1.5–7.7)
NEUTROPHILS # BLD: NORMAL 10*3/UL
NEUTROPHILS NFR BLD AUTO: 65.5 %
NEUTROPHILS NFR BLD: NORMAL %
NRBC BLD MANUAL-RTO: NORMAL %
NT-PROBNP SERPL-MCNC: 4895 PG/ML
NT-PROBNP SERPL-MCNC: 4895 PG/ML (ref ?–450)
OSMOLALITY SERPL CALC.SUM OF ELEC: 290 MOSM/KG (ref 275–295)
PLAT MORPH BLD: NORMAL
PLATELET # BLD AUTO: 222 10(3)UL (ref 150–450)
PLATELET # BLD: 222 10*3/UL
POTASSIUM SERPL-SCNC: 4.5 MMOL/L (ref 3.5–5.1)
RBC # BLD AUTO: 3.91 X10(6)UL (ref 3.8–5.8)
RBC # BLD: 3.91 10*6/UL
RBC MORPH BLD: NORMAL
SODIUM SERPL-SCNC: 138 MMOL/L (ref 136–145)
WBC # BLD AUTO: 6.7 X10(3) UL (ref 4–11)
WBC # BLD: 6.7 10*3/UL
WBC MORPH BLD: NORMAL

## 2019-09-15 PROCEDURE — 96374 THER/PROPH/DIAG INJ IV PUSH: CPT

## 2019-09-15 PROCEDURE — 83880 ASSAY OF NATRIURETIC PEPTIDE: CPT | Performed by: EMERGENCY MEDICINE

## 2019-09-15 PROCEDURE — 71046 X-RAY EXAM CHEST 2 VIEWS: CPT | Performed by: EMERGENCY MEDICINE

## 2019-09-15 PROCEDURE — 93970 EXTREMITY STUDY: CPT | Performed by: EMERGENCY MEDICINE

## 2019-09-15 PROCEDURE — 80048 BASIC METABOLIC PNL TOTAL CA: CPT | Performed by: EMERGENCY MEDICINE

## 2019-09-15 PROCEDURE — 99212 OFFICE O/P EST SF 10 MIN: CPT

## 2019-09-15 PROCEDURE — 99284 EMERGENCY DEPT VISIT MOD MDM: CPT

## 2019-09-15 PROCEDURE — 85025 COMPLETE CBC W/AUTO DIFF WBC: CPT | Performed by: EMERGENCY MEDICINE

## 2019-09-15 RX ORDER — FUROSEMIDE 10 MG/ML
40 INJECTION INTRAMUSCULAR; INTRAVENOUS ONCE
Status: COMPLETED | OUTPATIENT
Start: 2019-09-15 | End: 2019-09-15

## 2019-09-15 RX ORDER — FUROSEMIDE 40 MG/1
40 TABLET ORAL DAILY
Qty: 10 TABLET | Refills: 0 | Status: SHIPPED | OUTPATIENT
Start: 2019-09-15 | End: 2020-05-27 | Stop reason: CLARIF

## 2019-09-15 NOTE — ED PROVIDER NOTES
Patient Seen in: [facility] AND [facility] Emergency Department    History   Patient presents with:  Swelling Edema (cardiovascular, metabolic)    Stated Complaint: swelling to satish legs, from IC    HPI    Patient is an 80-year-old male who presents to the emerge Past Surgical History:   Procedure Laterality Date   • ABLATION      a-fib ablation -> 2008   • BACK SURGERY      kyphoplasty - 7/72012   • CARDIAC PACEMAKER PLACEMENT      July 2016 last checked 4/2017   • COLONOSCOPY, POSSIBLE BIOPSY, POSSIBLE POLYPECTOM Temp src Temporal   SpO2 98 %   O2 Device None (Room air)       Current:/90   Pulse 70   Temp 98 °F (36.7 °C) (Temporal)   Resp 17   Ht 172.7 cm (5' 8\")   Wt 75 kg   SpO2 96%   BMI 25.14 kg/m²         Physical Exam   Constitutional: He is oriented t Please view results for these tests on the individual orders. RAINBOW DRAW BLUE   RAINBOW DRAW LAVENDER   RAINBOW DRAW LIGHT GREEN   RAINBOW DRAW GOLD              MDM   Suspect prednisone induced water retention. Patient given 40 mg IV Lasix.   Will dis

## 2019-09-15 NOTE — ED PROVIDER NOTES
[facility]    History   Patient presents with:  Edema    Stated Complaint: L leg swollen     HPI    The patient complains of bilateral leg swelling which he has noted in the past day.   The patient states 4/2017   • COLONOSCOPY, POSSIBLE BIOPSY, POSSIBLE POLYPECTOMY 88009 N/A 9/20/2017    Performed by Ian Green MD at [address] [place] [address]   • COLONOSCOPY, POSSIBLE BIOPSY, POSSIBLE POLYPECTOMY 98546 N/A 10/17/2012    Performed by Ian Green MD at [facility] [09/15/19 1353]   /50   Pulse 70   Resp 16   Temp 97.4 °F (36.3 °C)   Temp src    SpO2 99 %   O2 Device        Current:/74   Pulse 70   Temp 97.4 °F (36.3 °C)   Resp 16   SpO2 99%         Physical Exam    Patient is awake and alert not in distr

## 2019-09-15 NOTE — ED NOTES
No change in condition. Resting on cart in no apparent distress. Call light within reach. Family at cartside. Will continue to monitor.

## 2019-09-18 LAB
ANION GAP SERPL CALC-SCNC: NORMAL MMOL/L
BUN SERPL-MCNC: 29 MG/DL
BUN/CREAT SERPL: NORMAL
CALCIUM SERPL-MCNC: 9.1 MG/DL
CHLORIDE SERPL-SCNC: 103 MMOL/L
CO2 SERPL-SCNC: 28.4 MMOL/L
CREAT SERPL-MCNC: 1.12 MG/DL
GLUCOSE SERPL-MCNC: 81 MG/DL
LENGTH OF FAST TIME PATIENT: NORMAL H
POTASSIUM SERPL-SCNC: 4.1 MMOL/L
SODIUM SERPL-SCNC: 136 MMOL/L
TSH SERPL-ACNC: 2 M[IU]/L

## 2019-11-16 ENCOUNTER — ANCILLARY PROCEDURE (OUTPATIENT)
Dept: CARDIOLOGY | Age: 84
End: 2019-11-16
Attending: INTERNAL MEDICINE

## 2019-11-16 DIAGNOSIS — Z95.0 CARDIAC PACEMAKER IN SITU: ICD-10-CM

## 2019-11-18 PROCEDURE — 93294 REM INTERROG EVL PM/LDLS PM: CPT | Performed by: INTERNAL MEDICINE

## 2019-11-27 RX ORDER — RIVAROXABAN 15 MG/1
TABLET, FILM COATED ORAL
Qty: 90 TABLET | Refills: 1 | Status: SHIPPED | OUTPATIENT
Start: 2019-11-27 | End: 2020-05-22

## 2019-12-05 RX ORDER — FUROSEMIDE 40 MG/1
40 TABLET ORAL
COMMUNITY
Start: 2019-09-15 | End: 2020-09-08

## 2019-12-06 ENCOUNTER — OFFICE VISIT (OUTPATIENT)
Dept: CARDIOLOGY | Age: 84
End: 2019-12-06

## 2019-12-06 VITALS
HEART RATE: 70 BPM | WEIGHT: 160 LBS | HEIGHT: 65 IN | DIASTOLIC BLOOD PRESSURE: 76 MMHG | OXYGEN SATURATION: 97 % | BODY MASS INDEX: 26.66 KG/M2 | SYSTOLIC BLOOD PRESSURE: 134 MMHG

## 2019-12-06 DIAGNOSIS — R06.02 SHORTNESS OF BREATH: Primary | ICD-10-CM

## 2019-12-06 PROCEDURE — 99214 OFFICE O/P EST MOD 30 MIN: CPT | Performed by: INTERNAL MEDICINE

## 2019-12-06 RX ORDER — ATORVASTATIN CALCIUM 10 MG/1
TABLET, FILM COATED ORAL
Refills: 2 | COMMUNITY
Start: 2019-12-03

## 2019-12-06 ASSESSMENT — PATIENT HEALTH QUESTIONNAIRE - PHQ9
1. LITTLE INTEREST OR PLEASURE IN DOING THINGS: NOT AT ALL
SUM OF ALL RESPONSES TO PHQ9 QUESTIONS 1 AND 2: 0
SUM OF ALL RESPONSES TO PHQ9 QUESTIONS 1 AND 2: 0
2. FEELING DOWN, DEPRESSED OR HOPELESS: NOT AT ALL

## 2019-12-11 ENCOUNTER — HOSPITAL ENCOUNTER (OUTPATIENT)
Dept: BONE DENSITY | Facility: HOSPITAL | Age: 84
Discharge: HOME | End: 2019-12-11
Attending: INTERNAL MEDICINE
Payer: MEDICARE

## 2019-12-11 DIAGNOSIS — M81.0 OSTEOPOROSIS, UNSPECIFIED OSTEOPOROSIS TYPE, UNSPECIFIED PATHOLOGICAL FRACTURE PRESENCE: ICD-10-CM

## 2019-12-11 PROCEDURE — 77080 DXA BONE DENSITY AXIAL: CPT | Performed by: INTERNAL MEDICINE

## 2019-12-12 NOTE — PROGRESS NOTES
Good news is that the hip bone density is improved. There is small decrease in bone density in the spine, but the bone density is normal.  He should continue the prolia as recommended. Repeat DEXA in 2 years.

## 2020-01-01 NOTE — CONSULTS
CARDIOLOGY CONSULT - [facility]      NAME: Nancy Hagan - ROOM: 43/43 - MRN: [id number] - Age: 80year old - :  1934    Date of Admission: 2018  9:22 AM  Admission Diagnosis: No admission diagnoses are documented Osteoarthritis    • Osteopenia     but with compression fractures   • Pacemaker    • Prostate cancer [facility])     s/p prostatectomy -2003 - Urologist - Dr. Keita Click   • Pulmonary nodule     1.6 cm - stable x 3 years -no further f/up needed   • Pulmonary nodule cardiac ablation/and pacemaker 7/20/2016 07/2016: PACEMAKER MONITOR  10/17/2012: PATIENT DOCUMENTED NOT TO HAVE EXPERIENCED ANY*      Comment: Procedure: COLONOSCOPY, POSSIBLE BIOPSY,                POSSIBLE POLYPECTOMY [address] [employer] ;  Surgeon: John Mcgrath wound is healing then I would leave the staples in and restart xarelto at that time. Staples out in about 2 weeks. Instructed to keep wound dry until seen in the pacer clinic next week.      If he has recurrent bleeding despite staples, would probable n 88

## 2020-01-28 ENCOUNTER — OFFICE VISIT (OUTPATIENT)
Dept: OTOLARYNGOLOGY | Facility: CLINIC | Age: 85
End: 2020-01-28
Payer: MEDICARE

## 2020-01-28 VITALS
BODY MASS INDEX: 28.88 KG/M2 | SYSTOLIC BLOOD PRESSURE: 133 MMHG | DIASTOLIC BLOOD PRESSURE: 81 MMHG | WEIGHT: 163 LBS | TEMPERATURE: 98 F | HEIGHT: 63 IN

## 2020-01-28 DIAGNOSIS — H61.23 BILATERAL IMPACTED CERUMEN: Primary | ICD-10-CM

## 2020-01-28 PROCEDURE — 69210 REMOVE IMPACTED EAR WAX UNI: CPT | Performed by: OTOLARYNGOLOGY

## 2020-01-28 NOTE — PROGRESS NOTES
Latricia Gomes is a 80year old male.   Patient presents with:  Cerumen Impaction: pt presents with bilateral ear wax impaction      HISTORY OF PRESENT ILLNESS    Patient presents for cerumen removal. No other complaints or concerns at this time    Social Hist 1.6 cm - stable x 3 years -no further f/up needed   • Pulmonary nodule     new pulm nodule- repeat CT chest in 7/2018   • Sacral fracture [facility])    • Shingles    • Thrombocytopenia ([address] 4/20/2011   • Visual impairment     glasses       Past Surgical Histor Negative Cold intolerance and heat intolerance. Neuro Negative Tremors. Psych Negative Anxiety and depression. Integumentary Negative Frequent skin infections, pigment change and rash. Hema/Lymph Negative Easy bleeding and easy bruising. daily., Disp: 10 tablet, Rfl: 0  •  Denosumab 60 MG/ML Subcutaneous Solution Prefilled Syringe, Inject 1 mL (60 mg total) into the skin once.  Historical documentation - see Epic Immunization Activity for administration details, Disp: 1 mL, Rfl: 0  •  ATORV

## 2020-02-11 ENCOUNTER — ANCILLARY PROCEDURE (OUTPATIENT)
Dept: CARDIOLOGY | Age: 85
End: 2020-02-11
Attending: INTERNAL MEDICINE

## 2020-02-11 DIAGNOSIS — Z95.0 CARDIAC PACEMAKER: ICD-10-CM

## 2020-02-12 PROCEDURE — 93294 REM INTERROG EVL PM/LDLS PM: CPT | Performed by: INTERNAL MEDICINE

## 2020-05-08 ENCOUNTER — ANCILLARY PROCEDURE (OUTPATIENT)
Dept: CARDIOLOGY | Age: 85
End: 2020-05-08
Attending: INTERNAL MEDICINE

## 2020-05-08 DIAGNOSIS — Z95.0 CARDIAC PACEMAKER: ICD-10-CM

## 2020-05-08 PROCEDURE — X1094 NO CHARGE VISIT: HCPCS | Performed by: INTERNAL MEDICINE

## 2020-05-22 RX ORDER — RIVAROXABAN 15 MG/1
TABLET, FILM COATED ORAL
Qty: 90 TABLET | Refills: 2 | Status: SHIPPED | OUTPATIENT
Start: 2020-05-22 | End: 2021-06-04 | Stop reason: SDUPTHER

## 2020-06-23 ENCOUNTER — OFFICE VISIT (OUTPATIENT)
Dept: OTOLARYNGOLOGY | Facility: CLINIC | Age: 85
End: 2020-06-23
Payer: MEDICARE

## 2020-06-23 VITALS
TEMPERATURE: 98 F | SYSTOLIC BLOOD PRESSURE: 129 MMHG | HEIGHT: 65 IN | BODY MASS INDEX: 27.16 KG/M2 | DIASTOLIC BLOOD PRESSURE: 79 MMHG | WEIGHT: 163 LBS | HEART RATE: 70 BPM

## 2020-06-23 DIAGNOSIS — H61.23 BILATERAL IMPACTED CERUMEN: Primary | ICD-10-CM

## 2020-06-23 PROCEDURE — 69210 REMOVE IMPACTED EAR WAX UNI: CPT | Performed by: OTOLARYNGOLOGY

## 2020-06-23 RX ORDER — LEVOTHYROXINE SODIUM 88 UG/1
TABLET ORAL
COMMUNITY
Start: 2020-06-04 | End: 2020-07-15 | Stop reason: DRUGHIGH

## 2020-06-23 NOTE — PROGRESS NOTES
Dalton Patel is a 80year old male.   Patient presents with:  Ear Wax: bilateral ear cleaning       HISTORY OF PRESENT ILLNESS    Patient presents for cerumen removal. No other complaints or concerns at this time    Social History    Socioeconomic History further f/up needed   • Pulmonary nodule     new pulm nodule- repeat CT chest in 7/2018   • Sacral fracture [facility])    • Shingles    • Thrombocytopenia ([facility] [address] 4/20/2011   • Visual impairment     glasses       Past Surgical History:   Procedure Laterality Date heat intolerance. Neuro Negative Tremors. Psych Negative Anxiety and depression. Integumentary Negative Frequent skin infections, pigment change and rash. Hema/Lymph Negative Easy bleeding and easy bruising.            PHYSICAL EXAM    /79   P total) into the skin once.  Historical documentation - see Epic Immunization Activity for administration details, Disp: 1 mL, Rfl: 0  •  ATORVASTATIN 10 MG Oral Tab, TAKE 1 TABLET BY MOUTH NIGHTLY, Disp: 90 tablet, Rfl: 3  •  LEVOTHYROXINE SODIUM 112 MCG Or

## 2020-07-13 ENCOUNTER — ANCILLARY PROCEDURE (OUTPATIENT)
Dept: CARDIOLOGY | Age: 85
End: 2020-07-13
Attending: INTERNAL MEDICINE

## 2020-07-13 VITALS — DIASTOLIC BLOOD PRESSURE: 68 MMHG | SYSTOLIC BLOOD PRESSURE: 112 MMHG | HEART RATE: 70 BPM

## 2020-07-13 DIAGNOSIS — Z45.018 PACEMAKER REPROGRAMMING/CHECK: ICD-10-CM

## 2020-07-13 PROCEDURE — 93280 PM DEVICE PROGR EVAL DUAL: CPT | Performed by: INTERNAL MEDICINE

## 2020-07-30 PROBLEM — C61 PROSTATE CANCER (HCC): Status: ACTIVE | Noted: 2020-07-30

## 2020-07-30 PROBLEM — I77.810 AORTIC ROOT DILATION (HCC): Status: ACTIVE | Noted: 2020-07-30

## 2020-07-30 LAB
BUN/CREAT SERPL: 29
CALCIUM SERPL-MCNC: 8.3 MG/DL
CHLORIDE SERPL-SCNC: 103 MMOL/L
CO2 SERPL-SCNC: 25.5 MMOL/L
CREAT SERPL-MCNC: 1 MG/DL
GLUCOSE SERPL-MCNC: 92 MG/DL
LENGTH OF FAST TIME PATIENT: NO H
POTASSIUM SERPL-SCNC: 4.42 MMOL/L
SODIUM SERPL-SCNC: 137 MMOL/L

## 2020-08-03 LAB
ALBUMIN SERPL-MCNC: 3.6 G/DL
ALP SERPL-CCNC: 51 U/L
ALT SERPL-CCNC: 10 UNITS/L
AST SERPL-CCNC: 23 UNITS/L
BILIRUB SERPL-MCNC: 0.81 MG/DL
BUN/CREAT SERPL: 29
CALCIUM SERPL-MCNC: 8.4 MG/DL
CHLORIDE SERPL-SCNC: 105 MMOL/L
CO2 SERPL-SCNC: 24.3 MMOL/L
CREAT SERPL-MCNC: 0.83 MG/DL
GLUCOSE SERPL-MCNC: 90 MG/DL
HCT VFR BLD CALC: 35.9 %
HGB BLD-MCNC: 12.1 G/DL
LENGTH OF FAST TIME PATIENT: NO H
MCH RBC QN AUTO: 31.3 PG
MCHC RBC AUTO-ENTMCNC: 33.7 G/DL
MCV RBC AUTO: 93 FL
PLATELET # BLD: 166 K/MCL
POTASSIUM SERPL-SCNC: 4.42 MMOL/L
PROT SERPL-MCNC: 6.2 G/DL
RBC # BLD: 3.86 10*6/UL
SODIUM SERPL-SCNC: 137 MMOL/L
WBC # BLD: 4.47 K/MCL

## 2020-08-25 ENCOUNTER — DOCUMENTATION (OUTPATIENT)
Dept: CARDIOLOGY | Age: 85
End: 2020-08-25

## 2020-08-26 ENCOUNTER — ANCILLARY PROCEDURE (OUTPATIENT)
Dept: CARDIOLOGY | Age: 85
End: 2020-08-26
Attending: INTERNAL MEDICINE

## 2020-08-26 VITALS — BODY MASS INDEX: 26.66 KG/M2 | WEIGHT: 160 LBS | HEIGHT: 65 IN

## 2020-08-26 DIAGNOSIS — I25.10 CORONARY ARTERY DISEASE INVOLVING NATIVE CORONARY ARTERY OF NATIVE HEART WITHOUT ANGINA PECTORIS: ICD-10-CM

## 2020-08-26 LAB
LV EF: 61 %
STRESS POST EXERCISE DUR SEC: 35 SEC
STRESS TARGET HR: 134 BPM

## 2020-08-26 PROCEDURE — A9502 TC99M TETROFOSMIN: HCPCS | Performed by: INTERNAL MEDICINE

## 2020-08-26 PROCEDURE — 78452 HT MUSCLE IMAGE SPECT MULT: CPT | Performed by: INTERNAL MEDICINE

## 2020-08-26 PROCEDURE — 93015 CV STRESS TEST SUPVJ I&R: CPT | Performed by: INTERNAL MEDICINE

## 2020-08-26 RX ORDER — REGADENOSON 0.08 MG/ML
0.4 INJECTION, SOLUTION INTRAVENOUS ONCE
Status: COMPLETED | OUTPATIENT
Start: 2020-08-26 | End: 2020-08-26

## 2020-08-26 RX ADMIN — REGADENOSON 0.4 MG: 0.08 INJECTION, SOLUTION INTRAVENOUS at 11:25

## 2020-08-26 ASSESSMENT — EXERCISE STRESS TEST
PEAK_HR: 75
PEAK_RPP: 9300
PEAK_BP: 124/64
COMMENTS: 2 MINUTE RECOVERY
COMMENTS: 4 MINUTE RECOVERY
STAGE_CATEGORIES: 1
COMMENTS: 20 SECOND RECOVERY
STAGE_CATEGORIES: RECOVERY 0
COMMENTS: INJECTED AT :30
PEAK_HR: 75
STAGE_CATEGORIES: RECOVERY 1
PEAK_HR: 78
PEAK_RPP: 9120
PEAK_BP: 124/64
COMMENTS: 3 MINUTE RECOVERY
PEAK_RPP: 8850
PEAK_BP: 126/70
STAGE_CATEGORIES: RECOVERY 3
PEAK_BP: 114/62
STAGE_CATEGORIES: RECOVERY 2
STOPPAGE_REASON: PROTOCOL COMPLETE
STAGE_CATEGORIES: RESTING
PEAK_HR: 80
PEAK_HR: 75
PEAK_HR: 75
PEAK_RPP: 9828
PEAK_RPP: 9300
PEAK_BP: 118/60

## 2020-08-28 ENCOUNTER — TELEPHONE (OUTPATIENT)
Dept: CARDIOLOGY | Age: 85
End: 2020-08-28

## 2020-09-08 ENCOUNTER — OFFICE VISIT (OUTPATIENT)
Dept: CARDIOLOGY | Age: 85
End: 2020-09-08

## 2020-09-08 ENCOUNTER — TELEPHONE (OUTPATIENT)
Dept: CARDIOLOGY | Age: 85
End: 2020-09-08

## 2020-09-08 VITALS
OXYGEN SATURATION: 95 % | HEART RATE: 72 BPM | WEIGHT: 158 LBS | HEIGHT: 65 IN | SYSTOLIC BLOOD PRESSURE: 120 MMHG | DIASTOLIC BLOOD PRESSURE: 70 MMHG | BODY MASS INDEX: 26.33 KG/M2

## 2020-09-08 DIAGNOSIS — Z95.0 CARDIAC PACEMAKER IN SITU: ICD-10-CM

## 2020-09-08 DIAGNOSIS — E78.00 HYPERCHOLESTEREMIA: ICD-10-CM

## 2020-09-08 DIAGNOSIS — I71.20 THORACIC AORTIC ANEURYSM WITHOUT RUPTURE (CMD): ICD-10-CM

## 2020-09-08 DIAGNOSIS — I25.10 CORONARY ARTERY DISEASE INVOLVING NATIVE CORONARY ARTERY OF NATIVE HEART WITHOUT ANGINA PECTORIS: Primary | ICD-10-CM

## 2020-09-08 DIAGNOSIS — I48.91 ATRIAL FIBRILLATION STATUS POST CARDIOVERSION (CMD): ICD-10-CM

## 2020-09-08 PROCEDURE — 99214 OFFICE O/P EST MOD 30 MIN: CPT | Performed by: INTERNAL MEDICINE

## 2020-09-08 RX ORDER — ERGOCALCIFEROL (VITAMIN D2) 50 MCG
25 CAPSULE ORAL DAILY
COMMUNITY
Start: 2020-07-17

## 2020-09-08 ASSESSMENT — PATIENT HEALTH QUESTIONNAIRE - PHQ9
1. LITTLE INTEREST OR PLEASURE IN DOING THINGS: NOT AT ALL
CLINICAL INTERPRETATION OF PHQ2 SCORE: NO FURTHER SCREENING NEEDED
CLINICAL INTERPRETATION OF PHQ9 SCORE: NO FURTHER SCREENING NEEDED
2. FEELING DOWN, DEPRESSED OR HOPELESS: NOT AT ALL
SUM OF ALL RESPONSES TO PHQ9 QUESTIONS 1 AND 2: 0
SUM OF ALL RESPONSES TO PHQ9 QUESTIONS 1 AND 2: 0

## 2020-09-09 ENCOUNTER — TELEPHONE (OUTPATIENT)
Dept: CARDIOLOGY | Age: 85
End: 2020-09-09

## 2020-09-14 ENCOUNTER — TELEPHONE (OUTPATIENT)
Dept: CARDIOLOGY | Age: 85
End: 2020-09-14

## 2020-09-28 ENCOUNTER — CLINICAL ABSTRACT (OUTPATIENT)
Dept: CARDIOLOGY | Age: 85
End: 2020-09-28

## 2020-09-28 ENCOUNTER — LAB ENCOUNTER (OUTPATIENT)
Dept: LAB | Facility: HOSPITAL | Age: 85
End: 2020-09-28
Attending: INTERNAL MEDICINE
Payer: MEDICARE

## 2020-09-28 DIAGNOSIS — E78.00 HYPERCHOLESTEREMIA: Primary | ICD-10-CM

## 2020-09-28 LAB
CHOLEST SERPL-MCNC: 120 MG/DL
CHOLEST SERPL-MCNC: 120 MG/DL
CHOLEST/HDLC SERPL: 58 {RATIO}
CK SERPL-CCNC: 61 U/L
HDLC SERPL-MCNC: 58 MG/DL
LDLC SERPL CALC-MCNC: 52 MG/DL
TRIGL SERPL-MCNC: 51 MG/DL
TRIGL SERPL-MCNC: 51 MG/DL
VLDLC SERPL CALC-MCNC: 10 MG/DL

## 2020-09-28 PROCEDURE — 82550 ASSAY OF CK (CPK): CPT

## 2020-09-28 PROCEDURE — 36415 COLL VENOUS BLD VENIPUNCTURE: CPT

## 2020-09-28 PROCEDURE — 80061 LIPID PANEL: CPT

## 2020-10-27 LAB
BUN SERPL-MCNC: 29 MG/DL
BUN/CREAT SERPL: 35
CALCIUM SERPL-MCNC: 9 MG/DL
CHLORIDE SERPL-SCNC: 101 MMOL/L
CO2 SERPL-SCNC: 23.7 MMOL/L
CREAT SERPL-MCNC: 0.84 MG/DL
GLUCOSE SERPL-MCNC: 100 MG/DL
HGB BLD-MCNC: 12 G/DL
PLATELET # BLD: 145 K/MCL
POTASSIUM SERPL-SCNC: 4.52 MMOL/L
RBC # BLD: 3.87 10*6/UL
SODIUM SERPL-SCNC: 136 MMOL/L
WBC # BLD: 6.55 K/MCL

## 2020-11-16 ENCOUNTER — ANCILLARY PROCEDURE (OUTPATIENT)
Dept: CARDIOLOGY | Age: 85
End: 2020-11-16
Attending: INTERNAL MEDICINE

## 2020-11-16 DIAGNOSIS — R06.02 SHORTNESS OF BREATH: ICD-10-CM

## 2020-11-16 PROCEDURE — 93306 TTE W/DOPPLER COMPLETE: CPT | Performed by: INTERNAL MEDICINE

## 2020-11-18 ENCOUNTER — TELEPHONE (OUTPATIENT)
Dept: CARDIOLOGY | Age: 85
End: 2020-11-18

## 2020-11-19 ENCOUNTER — ANCILLARY PROCEDURE (OUTPATIENT)
Dept: CARDIOLOGY | Age: 85
End: 2020-11-19
Attending: INTERNAL MEDICINE

## 2020-11-19 DIAGNOSIS — Z95.0 CARDIAC PACEMAKER: ICD-10-CM

## 2020-12-04 ENCOUNTER — OFFICE VISIT (OUTPATIENT)
Dept: CARDIOLOGY | Age: 85
End: 2020-12-04

## 2020-12-04 ENCOUNTER — TELEPHONE (OUTPATIENT)
Dept: CARDIOLOGY | Age: 85
End: 2020-12-04

## 2020-12-04 VITALS
WEIGHT: 162 LBS | HEIGHT: 65 IN | SYSTOLIC BLOOD PRESSURE: 130 MMHG | DIASTOLIC BLOOD PRESSURE: 64 MMHG | BODY MASS INDEX: 26.99 KG/M2 | HEART RATE: 70 BPM

## 2020-12-04 DIAGNOSIS — E78.00 HYPERCHOLESTEREMIA: ICD-10-CM

## 2020-12-04 DIAGNOSIS — I48.21 PERMANENT ATRIAL FIBRILLATION (CMD): Primary | ICD-10-CM

## 2020-12-04 DIAGNOSIS — Z95.0 CARDIAC PACEMAKER IN SITU: ICD-10-CM

## 2020-12-04 PROBLEM — I48.91 ATRIAL FIBRILLATION STATUS POST CARDIOVERSION (CMD): Status: RESOLVED | Noted: 2019-09-04 | Resolved: 2020-12-04

## 2020-12-04 PROCEDURE — 99214 OFFICE O/P EST MOD 30 MIN: CPT | Performed by: INTERNAL MEDICINE

## 2020-12-04 RX ORDER — LEVOTHYROXINE SODIUM 88 UG/1
TABLET ORAL
COMMUNITY
Start: 2020-12-01

## 2020-12-04 ASSESSMENT — PATIENT HEALTH QUESTIONNAIRE - PHQ9
CLINICAL INTERPRETATION OF PHQ9 SCORE: NO FURTHER SCREENING NEEDED
SUM OF ALL RESPONSES TO PHQ9 QUESTIONS 1 AND 2: 0
1. LITTLE INTEREST OR PLEASURE IN DOING THINGS: NOT AT ALL
2. FEELING DOWN, DEPRESSED OR HOPELESS: NOT AT ALL
CLINICAL INTERPRETATION OF PHQ2 SCORE: NO FURTHER SCREENING NEEDED
SUM OF ALL RESPONSES TO PHQ9 QUESTIONS 1 AND 2: 0

## 2020-12-12 ENCOUNTER — APPOINTMENT (OUTPATIENT)
Dept: GENERAL RADIOLOGY | Facility: HOSPITAL | Age: 85
End: 2020-12-12
Attending: EMERGENCY MEDICINE
Payer: MEDICARE

## 2020-12-12 ENCOUNTER — HOSPITAL ENCOUNTER (EMERGENCY)
Facility: HOSPITAL | Age: 85
Discharge: HOME | End: 2020-12-13
Attending: EMERGENCY MEDICINE
Payer: MEDICARE

## 2020-12-12 DIAGNOSIS — U07.1 COVID-19: Primary | ICD-10-CM

## 2020-12-12 LAB — SARS-COV-2 RNA RESP QL NAA+PROBE: DETECTED

## 2020-12-12 PROCEDURE — 99284 EMERGENCY DEPT VISIT MOD MDM: CPT

## 2020-12-12 PROCEDURE — 71045 X-RAY EXAM CHEST 1 VIEW: CPT | Performed by: EMERGENCY MEDICINE

## 2020-12-13 VITALS
SYSTOLIC BLOOD PRESSURE: 134 MMHG | TEMPERATURE: 98 F | BODY MASS INDEX: 26.66 KG/M2 | HEART RATE: 70 BPM | WEIGHT: 160 LBS | RESPIRATION RATE: 22 BRPM | HEIGHT: 65 IN | OXYGEN SATURATION: 95 % | DIASTOLIC BLOOD PRESSURE: 80 MMHG

## 2020-12-13 NOTE — ED PROVIDER NOTES
Patient Seen in: [facility] AND [facility] Emergency Department      History   Patient presents with:  Covid    Stated Complaint: covid    HPI  Patient is an 35-year-old male history of A. fib status post cardioversion, hypertension, hyperlipidemia, hypothyroidi 7/26133   • CARDIAC PACEMAKER PLACEMENT      July 2016 last checked 4/2017   • COLONOSCOPY, POSSIBLE BIOPSY, POSSIBLE POLYPECTOMY 78206 N/A 9/20/2017    Performed by Duane Vásquez MD at [facility]   • COLONOSCOPY, POSSIBLE BIOPSY, POSSIBLE SHASTA air)       Current:/78   Pulse 69   Temp 98.2 °F (36.8 °C) (Temporal)   Resp 23   Ht 165.1 cm (5' 5\")   Wt 72.6 kg   SpO2 96%   BMI 26.63 kg/m²         Physical Exam  Vitals signs and nursing note reviewed.    Constitutional:       General: He is not acute distress. Normal vital signs. No hypoxia. Patient well-appearing, no tachypnea or increased work of breathing. Speaking in full sentences. Lungs clear bilaterally. Patient rapid Covid positive. Patient needs criteria for antibody infusion.   An to self-isolate and use infection control measures (e.g., wear mask, isolate, social distance, avoid sharing personal items, clean and disinfect “high touch” surfaces, and frequent handwashing) according to CDC guidelines.              Disposition and Plan

## 2020-12-13 NOTE — ED NOTES
Discussed pulse ox and incentive spirometry use with pt. Pt was able to successfully demonstrate proper useage.

## 2021-02-24 PROBLEM — R60.0 BILATERAL LEG EDEMA: Status: ACTIVE | Noted: 2021-02-24

## 2021-03-02 ENCOUNTER — ANCILLARY ORDERS (OUTPATIENT)
Dept: CARDIOLOGY | Age: 86
End: 2021-03-02

## 2021-03-02 ENCOUNTER — ANCILLARY PROCEDURE (OUTPATIENT)
Dept: CARDIOLOGY | Age: 86
End: 2021-03-02
Attending: INTERNAL MEDICINE

## 2021-03-02 DIAGNOSIS — Z95.0 CARDIAC PACEMAKER: ICD-10-CM

## 2021-03-02 PROCEDURE — 93294 REM INTERROG EVL PM/LDLS PM: CPT | Performed by: INTERNAL MEDICINE

## 2021-03-02 PROCEDURE — X1114 CARDIAC DEVICE HOME CHECK - REMOTE UNSCHEDULED: HCPCS | Performed by: INTERNAL MEDICINE

## 2021-03-28 ENCOUNTER — HOSPITAL ENCOUNTER (OUTPATIENT)
Facility: HOSPITAL | Age: 86
Setting detail: OBSERVATION
LOS: 1 days | Discharge: HOME | End: 2021-03-30
Attending: INTERNAL MEDICINE | Admitting: INTERNAL MEDICINE
Payer: MEDICARE

## 2021-03-28 ENCOUNTER — APPOINTMENT (OUTPATIENT)
Dept: GENERAL RADIOLOGY | Facility: HOSPITAL | Age: 86
End: 2021-03-28
Attending: EMERGENCY MEDICINE
Payer: MEDICARE

## 2021-03-28 ENCOUNTER — APPOINTMENT (OUTPATIENT)
Dept: CV DIAGNOSTICS | Facility: HOSPITAL | Age: 86
End: 2021-03-28
Attending: NURSE PRACTITIONER
Payer: MEDICARE

## 2021-03-28 DIAGNOSIS — R06.00 DYSPNEA ON EXERTION: Primary | ICD-10-CM

## 2021-03-28 PROBLEM — R06.09 DYSPNEA ON EXERTION: Status: ACTIVE | Noted: 2021-03-28

## 2021-03-28 PROCEDURE — 36415 COLL VENOUS BLD VENIPUNCTURE: CPT

## 2021-03-28 PROCEDURE — 85610 PROTHROMBIN TIME: CPT | Performed by: EMERGENCY MEDICINE

## 2021-03-28 PROCEDURE — 96374 THER/PROPH/DIAG INJ IV PUSH: CPT

## 2021-03-28 PROCEDURE — 99285 EMERGENCY DEPT VISIT HI MDM: CPT

## 2021-03-28 PROCEDURE — 93306 TTE W/DOPPLER COMPLETE: CPT | Performed by: NURSE PRACTITIONER

## 2021-03-28 PROCEDURE — 83880 ASSAY OF NATRIURETIC PEPTIDE: CPT | Performed by: EMERGENCY MEDICINE

## 2021-03-28 PROCEDURE — 84484 ASSAY OF TROPONIN QUANT: CPT | Performed by: INTERNAL MEDICINE

## 2021-03-28 PROCEDURE — 71045 X-RAY EXAM CHEST 1 VIEW: CPT | Performed by: EMERGENCY MEDICINE

## 2021-03-28 PROCEDURE — 93005 ELECTROCARDIOGRAM TRACING: CPT

## 2021-03-28 PROCEDURE — 85025 COMPLETE CBC W/AUTO DIFF WBC: CPT | Performed by: EMERGENCY MEDICINE

## 2021-03-28 PROCEDURE — 84484 ASSAY OF TROPONIN QUANT: CPT | Performed by: EMERGENCY MEDICINE

## 2021-03-28 PROCEDURE — 96376 TX/PRO/DX INJ SAME DRUG ADON: CPT

## 2021-03-28 PROCEDURE — 93010 ELECTROCARDIOGRAM REPORT: CPT | Performed by: EMERGENCY MEDICINE

## 2021-03-28 PROCEDURE — 80048 BASIC METABOLIC PNL TOTAL CA: CPT | Performed by: EMERGENCY MEDICINE

## 2021-03-28 PROCEDURE — 85730 THROMBOPLASTIN TIME PARTIAL: CPT | Performed by: EMERGENCY MEDICINE

## 2021-03-28 PROCEDURE — 99214 OFFICE O/P EST MOD 30 MIN: CPT | Performed by: INTERNAL MEDICINE

## 2021-03-28 RX ORDER — CALCIUM CARBONATE/VITAMIN D3 250-3.125
2 TABLET ORAL DAILY
Status: DISCONTINUED | OUTPATIENT
Start: 2021-03-28 | End: 2021-03-30

## 2021-03-28 RX ORDER — LEVOTHYROXINE SODIUM 88 UG/1
88 TABLET ORAL
Status: DISCONTINUED | OUTPATIENT
Start: 2021-03-28 | End: 2021-03-30

## 2021-03-28 RX ORDER — CHOLECALCIFEROL (VITAMIN D3) 125 MCG
1000 CAPSULE ORAL DAILY
Status: DISCONTINUED | OUTPATIENT
Start: 2021-03-28 | End: 2021-03-30

## 2021-03-28 RX ORDER — BISACODYL 10 MG
10 SUPPOSITORY, RECTAL RECTAL
Status: DISCONTINUED | OUTPATIENT
Start: 2021-03-28 | End: 2021-03-30

## 2021-03-28 RX ORDER — ATORVASTATIN CALCIUM 10 MG/1
10 TABLET, FILM COATED ORAL NIGHTLY
Status: DISCONTINUED | OUTPATIENT
Start: 2021-03-28 | End: 2021-03-30

## 2021-03-28 RX ORDER — FUROSEMIDE 10 MG/ML
20 INJECTION INTRAMUSCULAR; INTRAVENOUS
Status: DISCONTINUED | OUTPATIENT
Start: 2021-03-28 | End: 2021-03-29

## 2021-03-28 RX ORDER — POLYETHYLENE GLYCOL 3350 17 G/17G
17 POWDER, FOR SOLUTION ORAL DAILY PRN
Status: DISCONTINUED | OUTPATIENT
Start: 2021-03-28 | End: 2021-03-30

## 2021-03-28 RX ORDER — ACETAMINOPHEN 325 MG/1
650 TABLET ORAL EVERY 6 HOURS PRN
Status: DISCONTINUED | OUTPATIENT
Start: 2021-03-28 | End: 2021-03-30

## 2021-03-28 RX ORDER — ONDANSETRON 2 MG/ML
4 INJECTION INTRAMUSCULAR; INTRAVENOUS EVERY 6 HOURS PRN
Status: DISCONTINUED | OUTPATIENT
Start: 2021-03-28 | End: 2021-03-30

## 2021-03-28 RX ORDER — ACETAMINOPHEN 325 MG/1
650 TABLET ORAL EVERY 6 HOURS PRN
Status: DISCONTINUED | OUTPATIENT
Start: 2021-03-28 | End: 2021-03-28

## 2021-03-28 NOTE — H&P
[facility] Hospitalist H&P       CC: Patient presents with:  Difficulty Breathing       PCP: Loy Hampton MD    History of Present Illness:   Patient is a 79 yo male with hx hypothyroidism, HTN, HLD, OP, PAF, MGUS, lung nodules, skin cancer S/P resectio Thrombocytopenia [address] [address] 4/20/2011   • Visual impairment     glasses        PSH  Past Surgical History:   Procedure Laterality Date   • ABLATION      a-fib ablation -> 2008   • BACK SURGERY      kyphoplasty - 7/72012   • CARDIAC PACEMAKER PLACEMENT      July Disp: , Rfl:   XARELTO 15 MG Oral Tab, 15 mg daily with food. , Disp: , Rfl: 1  Vitamin B-12 1000 MCG Oral Tab, Take 1 tablet (1,000 mcg total) by mouth daily. , Disp: 90 tablet, Rfl: 3  Cholecalciferol (VITAMIN D) 1000 UNITS Oral Tab, Take 2,000 Units by 138   K 4.27 4.5    109   CO2 22.5 26.0   BUN 22.0* 31*   CREATSERUM 0.77 0.94   GLU 73* 94   CA  --  8.6       Recent Labs   Lab 03/24/21  1056   ALT 12   AST 25   ALB 3.9       Recent Labs   Lab 03/28/21  0158 03/28/21  0505   TROP <0.045 <0.045

## 2021-03-28 NOTE — CONSULTS
[facility]  Report of Consultation    Ma Carrel Patient Status:  Inpatient    1934 MRN [id number]   Location [facility] 3W/SW Attending [place] Delong MD   Hosp Day # 0 PCP MD Evelyn Frey History:   Procedure Laterality Date   • ABLATION      a-fib ablation -> 2008   • BACK SURGERY      kyphoplasty - 7/72012   • CARDIAC PACEMAKER PLACEMENT      July 2016 last checked 4/2017   • COLONOSCOPY, POSSIBLE BIOPSY, POSSIBLE POLYPECTOMY 69523 N/A 9/ D 250-125 MG-UNIT per tab TABS 2 tablet, 2 tablet, Oral, Daily  •  Vitamin D3 (Cholecalciferol) cap/tab 2,000 Units, 2,000 Units, Oral, Daily  •  Levothyroxine Sodium tab 88 mcg, 88 mcg, Oral, Before breakfast  •  Vitamin B-12 (VITAMIN B12) tab 1,000 mcg, 22.0 (H)   10/27/2020 29.0 (H)   08/03/2020 24.0 (H)   11/19/2014 30 (H)   09/25/2013 19   03/27/2013 20     Creatinine, Serum (mg/dL)   Date Value   11/19/2014 1.01   09/25/2013 0.96   03/27/2013 0.94     Creatinine (mg/dL)   Date Value   03/28/2021 0.94

## 2021-03-28 NOTE — PROGRESS NOTES
Calcium-Vitamin D3 600-400 MG-UNIT CAPS is Non-Formulary Medication &  Auto-Substituted to  Calcium Carbonate/Vitamin D 250-125 unit tab 2 tablets Per P&T PROTOCOL

## 2021-03-28 NOTE — PLAN OF CARE
Tylenol administered for rib pain with relief of symptoms. Con't IV lasix. +JIANG and +orthopnea. 2L oxygen nasal cannula applied for comfort. Plan for 2D echo.      Problem: Patient Centered Care  Goal: Patient preferences are identified and integrated in th Assess quality of pulses, skin color and temperature  - Assess for signs of decreased coronary artery perfusion - ex.  Angina  - Evaluate fluid balance, assess for edema, trend weights  Outcome: Progressing  Goal: Absence of cardiac arrhythmias or at baseli

## 2021-03-28 NOTE — ED INITIAL ASSESSMENT (HPI)
Pt received via EMS with c/o difficulty breathing, right-sided chest pain described as pressure since 2200 after eating dinner.

## 2021-03-28 NOTE — PROGRESS NOTES
[facility]    Progress Note    Hannah Aden Patient Status:  Inpatient    1934 MRN [id number]   Location [facility] 3W/SW Attending Ki Fitzpatrick MD   Hosp Day # 0 PCP Marcy Thacker MD     HPI/Subjective:     Constitution --------------------------       Assessment & Plan:     Assessment/Plan:    Shortness of breath  - probable CHF. Troponin negative. EKG shows AFib, V-paced  - Echo pending  - Echo 11/2020 - EF 64%, mod AR/MR, mild TR, RVSP 35  - net -435cc.   Cont IV Lasix

## 2021-03-28 NOTE — ED NOTES
Orders for admission, patient is aware of plan and ready to go upstairs. Any questions, please call ED RN Lorraine Washington  at extension [phone].    Type of COVID test sent: Rapid not detected  COVID Suspicion level: Low/High    Titratable drug(s) infusing: NA  Rate:

## 2021-03-28 NOTE — PLAN OF CARE
IV Lasix continued. Strict I/Os monitored. Fluid restriction maintained. Pending 2D echo. Wife of pt asked me to run her keys down to ER to her daughter for her to . Patient and wife both confirmed at 46 that the daughter got her keys.     Prob stability  - Monitor arterial and/or venous puncture sites for bleeding and/or hematoma  - Assess quality of pulses, skin color and temperature  - Assess for signs of decreased coronary artery perfusion - ex.  Angina  - Evaluate fluid balance, assess for ed devices as appropriate  - Consider OT/PT consult to assist with strengthening/mobility  - Encourage toileting schedule  Outcome: Progressing     Problem: DISCHARGE PLANNING  Goal: Discharge to home or other facility with appropriate resources  Description:

## 2021-03-28 NOTE — ED PROVIDER NOTES
Patient Seen in: [facility] AND [facility] Emergency Department      History   Patient presents with:  Difficulty Breathing    Stated Complaint: stella    HPI/Subjective:   HPI  80 yoM with atrial fibrillation on Xarelto, dual-chamber pacemaker, hypertension, hyper kyphoplasty - 7/40361   • CARDIAC PACEMAKER PLACEMENT      July 2016 last checked 4/2017   • COLONOSCOPY, POSSIBLE BIOPSY, POSSIBLE POLYPECTOMY 63733 N/A 9/20/2017    Performed by Whit Brooks MD at [address] [place] [address]   • COLONOSCOPY, [address] [place] [address] 70   Temp 97.5 °F (36.4 °C) (Oral)   Resp 18   Ht 177.8 cm (5' 10\")   Wt 72.1 kg   SpO2 92%   BMI 22.81 kg/m²         Physical Exam  Vitals and nursing note reviewed. Constitutional:       General: He is not in acute distress.      Appearance: He is well limits   TROPONIN I - Normal   RAPID SARS-COV-2 BY PCR - Normal   CBC WITH DIFFERENTIAL WITH PLATELET    Narrative: The following orders were created for panel order CBC WITH DIFFERENTIAL WITH PLATELET.   Procedure                               Abnormal

## 2021-03-29 ENCOUNTER — APPOINTMENT (OUTPATIENT)
Dept: NUCLEAR MEDICINE | Facility: HOSPITAL | Age: 86
End: 2021-03-29
Attending: INTERNAL MEDICINE
Payer: MEDICARE

## 2021-03-29 ENCOUNTER — APPOINTMENT (OUTPATIENT)
Dept: CT IMAGING | Facility: HOSPITAL | Age: 86
End: 2021-03-29
Attending: INTERNAL MEDICINE
Payer: MEDICARE

## 2021-03-29 LAB
CHOLEST SERPL-MCNC: 102 MG/DL
HDLC SERPL-MCNC: 46 MG/DL
LDLC SERPL CALC-MCNC: 46 MG/DL
NONHDLC SERPL-MCNC: 56 MG/DL
TRIGL SERPL-MCNC: 52 MG/DL
VLDLC SERPL CALC-MCNC: 10 MG/DL

## 2021-03-29 PROCEDURE — 71260 CT THORAX DX C+: CPT | Performed by: INTERNAL MEDICINE

## 2021-03-29 PROCEDURE — 85025 COMPLETE CBC W/AUTO DIFF WBC: CPT | Performed by: HOSPITALIST

## 2021-03-29 PROCEDURE — 80061 LIPID PANEL: CPT | Performed by: NURSE PRACTITIONER

## 2021-03-29 PROCEDURE — 85379 FIBRIN DEGRADATION QUANT: CPT | Performed by: INTERNAL MEDICINE

## 2021-03-29 PROCEDURE — 99214 OFFICE O/P EST MOD 30 MIN: CPT | Performed by: INTERNAL MEDICINE

## 2021-03-29 PROCEDURE — 80048 BASIC METABOLIC PNL TOTAL CA: CPT | Performed by: HOSPITALIST

## 2021-03-29 PROCEDURE — 92610 EVALUATE SWALLOWING FUNCTION: CPT

## 2021-03-29 RX ORDER — LISINOPRIL 10 MG/1
10 TABLET ORAL DAILY
Status: DISCONTINUED | OUTPATIENT
Start: 2021-03-29 | End: 2021-03-30

## 2021-03-29 RX ORDER — FUROSEMIDE 40 MG/1
40 TABLET ORAL DAILY
Status: DISCONTINUED | OUTPATIENT
Start: 2021-03-29 | End: 2021-03-30

## 2021-03-29 RX ORDER — MELATONIN
3 NIGHTLY PRN
Status: DISCONTINUED | OUTPATIENT
Start: 2021-03-29 | End: 2021-03-29

## 2021-03-29 RX ORDER — QUETIAPINE 25 MG/1
12.5 TABLET, FILM COATED ORAL ONCE
Status: DISCONTINUED | OUTPATIENT
Start: 2021-03-29 | End: 2021-03-30

## 2021-03-29 NOTE — PLAN OF CARE
Pt placed on OBS status. CT chest completed today. Unable to complete stress test d/t pt's inability to lay flat as he was having back/rib pain unresolved by Tylenol. IV morphine/IV dilaudid contraindicated d/t codeine allergy.  Lidocaine patch ordered and effectiveness of vasoactive medications to optimize hemodynamic stability  - Monitor arterial and/or venous puncture sites for bleeding and/or hematoma  - Assess quality of pulses, skin color and temperature  - Assess for signs of decreased coronary artery environment to reduce risk of injury  - Provide assistive devices as appropriate  - Consider OT/PT consult to assist with strengthening/mobility  - Encourage toileting schedule  Outcome: Progressing     Problem: DISCHARGE PLANNING  Goal: Discharge to home

## 2021-03-29 NOTE — SLP NOTE
ADULT SWALLOWING EVALUATION    ASSESSMENT    ASSESSMENT/OVERALL IMPRESSION:  PPE REQUIRED. THIS THERAPIST WORE GLOVES, DROPLET MASK, AND GOGGLES FOR DURATION OF EVALUATION. HANDS WASHED UPON ENTRANCE/EXIT. SLP BSSE orders received and acknowledged.  GABBI bowen recommendations reviewed with MEGHNA Holley MD and family. SLP collaborated with RN for MD diet orders. FCM SCORE: 6/7     PLAN: SLP to complete VFSS to rule out aspiration and determine least restrictive/safest diet.     RECOMMENDATIONS   Diet Recommendation nodule     new pulm nodule- repeat CT chest in 7/2018   • Sacral fracture (HCC)    • Shingles    • Thrombocytopenia ([facility] [address] 4/20/2011   • Visual impairment     glasses       Prior Living Situation: Home with spouse  Diet Prior to Admission: Regular; Thin liqu EXAMINATION  Dentition: Natural;Functional  Symmetry: Within Functional Limits  Strength:  Within Functional Limits  Tone: Within Functional Limits  Range of Motion: Within Functional Limits  Rate of Motion: Within Functional Limits    Voice Quality: Clear

## 2021-03-29 NOTE — CM/SW NOTE
Patient failed inpatient criteria. Second level of review completed and supports observation. UR committee in agreement. Discussed with Dr. David Patel  who approves observation status. MOON  notice explained and  provided  to the patient .  Copy placed in darius

## 2021-03-29 NOTE — PROGRESS NOTES
[facility] Hospitalist Progress Note     CC: Hospital Follow up    PCP: Stone Parekh MD       Assessment/Plan:     Principal Problem:    Dyspnea on exertion    Patient is a 81 yo male with hx hypothyroidism, HTN, HLD, OP, PAF, MGUS, lung nodules, skin can questions and note understanding and agreeing with therapeutic plan as outlined     Thank Marie Sam MD     [facility] Hospitalist  Answering Service number: [phone]     Subjective:     No CP, SOB, or N/V. Has some back pain.     OBJECTIVE:    Caesar Alejandro Labs   Lab 03/24/21  1056 03/28/21  0158 03/29/21  0431   GLU 73* 94 93   BUN 22.0* 31* 25*   CREATSERUM 0.77 0.94 0.89   GFRAA  --  85 89   GFRNAA  --  73 77   CA  --  8.6 8.3*    138 138   K 4.27 4.5 3.9    109 106   CO2 22.5 26.0 24.0 Daily   • lisinopril  10 mg Oral Daily   • atorvastatin  10 mg Oral Nightly   • Calcium Carbonate-Vitamin D  2 tablet Oral Daily   • Vitamin D3 (Cholecalciferol)  2,000 Units Oral Daily   • Levothyroxine Sodium  88 mcg Oral Before breakfast   • Vitamin B-1

## 2021-03-29 NOTE — CONSULTS
Pulmonary Consult     Assessment / Plan:  1. Dyspnea - due to aspiration pneumonitis  - swallow eval  - monitor off abx for now  - can use albuterol nebs if needed for airway clearance followed by flutter valve  2.  Abnormal CT chest - due to above  - will • Thrombocytopenia ([facility] [address] 4/20/2011   • Visual impairment     glasses       Past Surgical History:   Procedure Laterality Date   • ABLATION      a-fib ablation -> 2008   • BACK SURGERY      kyphoplasty - 7/72012   • CARDIAC PACEMAKER PLACEMENT      July 2 Take 250 mg by mouth 2 (two) times daily. , Disp: , Rfl: , 3/27/2021 at Unknown time  XARELTO 15 MG Oral Tab, 15 mg daily with food.   , Disp: , Rfl: 1, 3/27/2021 at Unknown time  Vitamin B-12 1000 MCG Oral Tab, Take 1 tablet (1,000 mcg total) by mouth mona 10.00      Smokeless tobacco: Never Used    Vaping Use      Vaping Use: Never used    Alcohol use:  Yes      Alcohol/week: 0.0 - 1.0 standard drinks      Comment: 2 beers a month    Drug use: No      Family History   Problem Relation Age of Onset   • Hypert

## 2021-03-29 NOTE — PROGRESS NOTES
[place] [facility] - [facility]     Progress Note    Aguila Solorzano Patient Status:  Inpatient    1934 MRN [id number]   Location [facility] 3W/SW Attending Meera Mujica MD   [address] Day # 1 PCP Herbie Monk MD     Assessment:    1.   Presentat dry.     Labs:  Lab Results   Component Value Date    WBC 5.7 03/29/2021    HGB 12.2 03/29/2021    HCT 36.3 03/29/2021    .0 03/29/2021     Lab Results   Component Value Date    INR 2.46 (H) 03/28/2021     Lab Results   Component Value Date    NA 13

## 2021-03-30 ENCOUNTER — APPOINTMENT (OUTPATIENT)
Dept: GENERAL RADIOLOGY | Facility: HOSPITAL | Age: 86
End: 2021-03-30
Attending: INTERNAL MEDICINE
Payer: MEDICARE

## 2021-03-30 VITALS
TEMPERATURE: 98 F | DIASTOLIC BLOOD PRESSURE: 66 MMHG | SYSTOLIC BLOOD PRESSURE: 114 MMHG | RESPIRATION RATE: 20 BRPM | BODY MASS INDEX: 25.6 KG/M2 | OXYGEN SATURATION: 96 % | HEART RATE: 70 BPM | WEIGHT: 153.63 LBS | HEIGHT: 65 IN

## 2021-03-30 LAB
ANION GAP SERPL CALC-SCNC: 7 MMOL/L
BUN SERPL-MCNC: 30 MG/DL
BUN/CREAT SERPL: 31.3
CALCIUM SERPL-MCNC: 9 MG/DL
CHLORIDE SERPL-SCNC: 105 MMOL/L
CO2 SERPL-SCNC: 26 MMOL/L
CREAT SERPL-MCNC: 0.96 MG/DL
GLUCOSE SERPL-MCNC: 93 MG/DL
HCT VFR BLD CALC: 39.7 %
HGB BLD-MCNC: 12.9 G/DL
MAGNESIUM SERPL-MCNC: 2.1 MG/DL
POTASSIUM SERPL-SCNC: 3.8 MMOL/L
RBC # BLD: 4.22 10*6/UL
SODIUM SERPL-SCNC: 138 MMOL/L
WBC # BLD: 6.6 K/MCL

## 2021-03-30 PROCEDURE — 85025 COMPLETE CBC W/AUTO DIFF WBC: CPT | Performed by: INTERNAL MEDICINE

## 2021-03-30 PROCEDURE — 83735 ASSAY OF MAGNESIUM: CPT | Performed by: INTERNAL MEDICINE

## 2021-03-30 PROCEDURE — 92611 MOTION FLUOROSCOPY/SWALLOW: CPT

## 2021-03-30 PROCEDURE — 74230 X-RAY XM SWLNG FUNCJ C+: CPT | Performed by: INTERNAL MEDICINE

## 2021-03-30 PROCEDURE — 99214 OFFICE O/P EST MOD 30 MIN: CPT | Performed by: INTERNAL MEDICINE

## 2021-03-30 PROCEDURE — 80048 BASIC METABOLIC PNL TOTAL CA: CPT | Performed by: INTERNAL MEDICINE

## 2021-03-30 RX ORDER — LISINOPRIL 10 MG/1
10 TABLET ORAL DAILY
Qty: 30 TABLET | Refills: 0 | Status: SHIPPED | OUTPATIENT
Start: 2021-03-31 | End: 2021-04-27

## 2021-03-30 RX ORDER — FUROSEMIDE 40 MG/1
40 TABLET ORAL DAILY
Qty: 30 TABLET | Refills: 0 | Status: SHIPPED | OUTPATIENT
Start: 2021-03-31 | End: 2021-04-29

## 2021-03-30 NOTE — PROGRESS NOTES
[facility]     Progress Note    Bobby Seen Patient Status:  Observation    1934 MRN [id number]   Location [facility] 3W/SW Attending Silvio Hodgkins, MD   [address] Day # 1 PCP Pamela Shelton MD     Assessment:    1.   Present deficits. Neck: No JVD, carotids 2+ no bruits. Cardiac: Regular rate and rhythm, S1, S2 normal, no murmur  Lungs: Clear without wheezes, rales, rhonchi. Normal excursions and effort. Abdomen: Soft, non-tender.    Extremities: Without clubbing, cyanosis

## 2021-03-30 NOTE — DISCHARGE SUMMARY
General Medicine Discharge Summary     Patient ID:  Nick Toussaint  80year old  7/1/1934    Admit date: 3/28/2021    Discharge date and time: No discharge date for patient encounter.  3/30/21    Attending Physician: Breonna Pelayo MD     Consults: IP CONSULT vision changes.     Hospital Course:   Patient is a 79 yo male with hx hypothyroidism, HTN, HLD, OP, PAF, MGUS, lung nodules, skin cancer S/P resection, h/o prostate ca, atrial Fib on xarelto and SSS S/P PPM, hs of covid in dec, who presented with dyspnea Juliet Mart MD on 3/30/2021 at 10:30 AM          XR CHEST AP PORTABLE  (CPT=71045)    Result Date: 3/28/2021  CONCLUSION:  1. No acute findings.     Dictated by (CST): Evelia Springer MD on 3/28/2021 at 8:24 AM     Finalized by (CST): Lynnette Huerta CHANGE how you take these medications    furosemide 40 MG Tabs  Commonly known as: LASIX  Take 1 tablet (40 mg total) by mouth daily.   Start taking on: March 31, 2021  What changed:   · medication strength  · how much to take        CONTINUE taking these instructions: Other Discharge Instructions: Follow new diet. Continue discharge medications. Keep all follow up appointments.      Patient had opportunity to ask questions and state understand and agree with therapeutic plan as outlined    Than

## 2021-03-30 NOTE — SLP NOTE
ADULT VIDEOFLUOROSCOPIC SWALLOWING STUDY    Admission Date: 3/28/2021  Evaluation Date: 03/30/21  Radiologist: Dr. Almita Isidro    PPE REQUIRED. THIS SLP WORE GLOVES, GOWN AND DROPLET MASK. HANDS SANITIZED/WASHED UPON ENTRANCE/EXIT.     PLAN: To f/u x1-2 sessi but with compression fractures   • Pacemaker    • Prostate cancer [facility])     s/p prostatectomy -2003 - Urologist - Dr. Castillo Marker   • Pulmonary nodule     1.6 cm - stable x 3 years -no further f/up needed   • Pulmonary nodule     new pulm nodule- repeat CT Lateral.  Patient Alertness: Fully alert. Consistencies Presented: Thin liquids; Hard solid to assess oropharyngeal swallow function and assess for compensatory strategies to improve safe swallow function.     THIN LIQUIDS  Method of Presentation: Teaspoon; solid/THIN liquids/no straw. VFSS results/recommendations discussed with Pt; good understanding. Orange handout of swallowing precautions/strategies provided.         FCM Score: 6   FCM Impression: Abnormal     GOALS  Goal #1 The patient will tolerate solid

## 2021-03-30 NOTE — PROGRESS NOTES
Pulmonary Progress Note     Assessment / Plan:  1. Dyspnea - due to aspiration pneumonitis. Improved  - swallow eval  - monitor off abx for now  2. Abnormal CT chest - due to above  - will need repeat imaging in 6-8 weeks to ensure resolution  3.  Dispo  -

## 2021-03-30 NOTE — PLAN OF CARE
Problem: Patient Centered Care  Goal: Patient preferences are identified and integrated in the patient's plan of care  Description: Interventions:  - What would you like us to know as we care for you?  \"I live at home with my wife and I have two daughter Evaluate fluid balance, assess for edema, trend weights  Outcome: Adequate for Discharge  Goal: Absence of cardiac arrhythmias or at baseline  Description: INTERVENTIONS:  - Continuous cardiac monitoring, monitor vital signs, obtain 12 lead EKG if indicate Discharge to home or other facility with appropriate resources  Description: INTERVENTIONS:  - Identify barriers to discharge w/pt and caregiver  - Include patient/family/discharge partner in discharge planning  - Arrange for needed discharge resources and

## 2021-03-30 NOTE — PLAN OF CARE
Problem: Patient Centered Care  Goal: Patient preferences are identified and integrated in the patient's plan of care  Description: Interventions:  - What would you like us to know as we care for you?  \"I live at home with my wife and I have two daughter edema, trend weights  Outcome: Progressing  Goal: Absence of cardiac arrhythmias or at baseline  Description: INTERVENTIONS:  - Continuous cardiac monitoring, monitor vital signs, obtain 12 lead EKG if indicated  - Evaluate effectiveness of antiarrhythmic INTERVENTIONS:  - Identify barriers to discharge w/pt and caregiver  - Include patient/family/discharge partner in discharge planning  - Arrange for needed discharge resources and transportation as appropriate  - Identify discharge learning needs (meds, wo

## 2021-03-30 NOTE — HOME CARE LIAISON
Received referral from [address] [facility] [address]. Patient provided with list of [place] [address] providers from [address] [facility] [address], patient choice is [address]. Financial interest disclosure provided to patient. All questions addressed and answered.  Referral Reserved in [place] an

## 2021-03-30 NOTE — CM/SW NOTE
03/30/21 1200   Discharge disposition   Expected discharge disposition Home-Health   Discharge transportation Private car   MDO received for discharge. Met with patient at the bedside for discharge, Planning.    Patient lives with his wife, Mary Jane Ames  In a

## 2021-03-31 ENCOUNTER — PATIENT OUTREACH (OUTPATIENT)
Dept: CASE MANAGEMENT | Age: 86
End: 2021-03-31

## 2021-03-31 RX ORDER — LISINOPRIL 10 MG/1
10 TABLET ORAL DAILY
COMMUNITY
Start: 2021-03-31

## 2021-03-31 RX ORDER — FUROSEMIDE 40 MG/1
20 TABLET ORAL DAILY
COMMUNITY
Start: 2021-03-31

## 2021-03-31 NOTE — PROGRESS NOTES
1st attempt SCC/HF apt request    [facility]  [address]  [phone]  [address]    Unable to contact patient. Will try again.

## 2021-04-01 NOTE — PROGRESS NOTES
2nd attempt SCC/HF apt request     [facility]  [address]  [phone]  [address]    Unable to contact patient. Will try again.

## 2021-04-02 NOTE — PROGRESS NOTES
3rd attempt SCC/HF apt request     [facility]  [address]  [phone]  [address]    Unable to contact patient after multiple attempts. No apt made. Closing encounter.

## 2021-04-08 ENCOUNTER — OFFICE VISIT (OUTPATIENT)
Dept: CARDIOLOGY | Age: 86
End: 2021-04-08

## 2021-04-08 VITALS
OXYGEN SATURATION: 100 % | DIASTOLIC BLOOD PRESSURE: 66 MMHG | HEART RATE: 66 BPM | HEIGHT: 65 IN | SYSTOLIC BLOOD PRESSURE: 120 MMHG | BODY MASS INDEX: 26.33 KG/M2 | WEIGHT: 158 LBS

## 2021-04-08 DIAGNOSIS — I48.21 PERMANENT ATRIAL FIBRILLATION (CMD): ICD-10-CM

## 2021-04-08 DIAGNOSIS — I50.31 ACUTE HEART FAILURE WITH PRESERVED EJECTION FRACTION (CMD): Primary | ICD-10-CM

## 2021-04-08 DIAGNOSIS — I10 ESSENTIAL HYPERTENSION: ICD-10-CM

## 2021-04-08 PROCEDURE — 99214 OFFICE O/P EST MOD 30 MIN: CPT | Performed by: NURSE PRACTITIONER

## 2021-04-08 ASSESSMENT — PATIENT HEALTH QUESTIONNAIRE - PHQ9
CLINICAL INTERPRETATION OF PHQ2 SCORE: NO FURTHER SCREENING NEEDED
CLINICAL INTERPRETATION OF PHQ9 SCORE: NO FURTHER SCREENING NEEDED
SUM OF ALL RESPONSES TO PHQ9 QUESTIONS 1 AND 2: 0
1. LITTLE INTEREST OR PLEASURE IN DOING THINGS: NOT AT ALL
2. FEELING DOWN, DEPRESSED OR HOPELESS: NOT AT ALL
SUM OF ALL RESPONSES TO PHQ9 QUESTIONS 1 AND 2: 0

## 2021-04-15 ENCOUNTER — LAB ENCOUNTER (OUTPATIENT)
Dept: LAB | Facility: HOSPITAL | Age: 86
End: 2021-04-15
Attending: INTERNAL MEDICINE
Payer: MEDICARE

## 2021-04-15 DIAGNOSIS — E87.5 HYPERKALEMIA: ICD-10-CM

## 2021-04-15 PROCEDURE — 36415 COLL VENOUS BLD VENIPUNCTURE: CPT

## 2021-04-15 PROCEDURE — 84132 ASSAY OF SERUM POTASSIUM: CPT

## 2021-05-10 LAB
ALBUMIN SERPL-MCNC: 3.5 G/DL
ALP SERPL-CCNC: 67 U/L
ALT SERPL-CCNC: 9 UNITS/L
AST SERPL-CCNC: 21 UNITS/L
BILIRUB SERPL-MCNC: 0.78 MG/DL
BUN/CREAT SERPL: 43
CALCIUM SERPL-MCNC: 9.6 MG/DL
CHLORIDE SERPL-SCNC: 103 MMOL/L
CO2 SERPL-SCNC: 31.5 MMOL/L
GLUCOSE SERPL-MCNC: 82 MG/DL
POTASSIUM SERPL-SCNC: 4.29 MMOL/L
PROT SERPL-MCNC: 6.6 G/DL
SODIUM SERPL-SCNC: 139 MMOL/L

## 2021-06-04 ENCOUNTER — OFFICE VISIT (OUTPATIENT)
Dept: CARDIOLOGY | Age: 86
End: 2021-06-04

## 2021-06-04 VITALS
HEART RATE: 70 BPM | BODY MASS INDEX: 25.49 KG/M2 | WEIGHT: 153 LBS | DIASTOLIC BLOOD PRESSURE: 76 MMHG | HEIGHT: 65 IN | SYSTOLIC BLOOD PRESSURE: 126 MMHG | OXYGEN SATURATION: 99 %

## 2021-06-04 DIAGNOSIS — I48.21 PERMANENT ATRIAL FIBRILLATION (CMD): ICD-10-CM

## 2021-06-04 DIAGNOSIS — Z95.0 CARDIAC PACEMAKER IN SITU: ICD-10-CM

## 2021-06-04 DIAGNOSIS — E78.00 HYPERCHOLESTEREMIA: ICD-10-CM

## 2021-06-04 DIAGNOSIS — I25.10 CORONARY ARTERY DISEASE INVOLVING NATIVE CORONARY ARTERY OF NATIVE HEART WITHOUT ANGINA PECTORIS: Primary | ICD-10-CM

## 2021-06-04 DIAGNOSIS — I10 ESSENTIAL HYPERTENSION: ICD-10-CM

## 2021-06-04 PROCEDURE — 99214 OFFICE O/P EST MOD 30 MIN: CPT | Performed by: INTERNAL MEDICINE

## 2021-06-04 RX ORDER — CEPHALEXIN 500 MG/1
CAPSULE ORAL
COMMUNITY
Start: 2021-05-14

## 2021-06-04 ASSESSMENT — PATIENT HEALTH QUESTIONNAIRE - PHQ9
CLINICAL INTERPRETATION OF PHQ9 SCORE: NO FURTHER SCREENING NEEDED
SUM OF ALL RESPONSES TO PHQ9 QUESTIONS 1 AND 2: 0
CLINICAL INTERPRETATION OF PHQ2 SCORE: NO FURTHER SCREENING NEEDED
SUM OF ALL RESPONSES TO PHQ9 QUESTIONS 1 AND 2: 0
1. LITTLE INTEREST OR PLEASURE IN DOING THINGS: NOT AT ALL
2. FEELING DOWN, DEPRESSED OR HOPELESS: NOT AT ALL

## 2021-06-07 ENCOUNTER — HOSPITAL ENCOUNTER (OUTPATIENT)
Dept: CT IMAGING | Facility: HOSPITAL | Age: 86
Discharge: HOME | End: 2021-06-07
Attending: INTERNAL MEDICINE
Payer: MEDICARE

## 2021-06-07 DIAGNOSIS — R93.89 ABNORMAL CT OF THE CHEST: ICD-10-CM

## 2021-06-07 PROCEDURE — 71250 CT THORAX DX C-: CPT | Performed by: INTERNAL MEDICINE

## 2021-06-24 ENCOUNTER — OFFICE VISIT (OUTPATIENT)
Dept: CARDIOLOGY | Age: 86
End: 2021-06-24

## 2021-06-24 VITALS
BODY MASS INDEX: 25.83 KG/M2 | HEIGHT: 65 IN | HEART RATE: 55 BPM | OXYGEN SATURATION: 100 % | SYSTOLIC BLOOD PRESSURE: 138 MMHG | DIASTOLIC BLOOD PRESSURE: 80 MMHG | WEIGHT: 155 LBS

## 2021-06-24 DIAGNOSIS — I25.10 CORONARY ARTERY DISEASE INVOLVING NATIVE CORONARY ARTERY OF NATIVE HEART WITHOUT ANGINA PECTORIS: ICD-10-CM

## 2021-06-24 DIAGNOSIS — I48.21 PERMANENT ATRIAL FIBRILLATION (CMD): Primary | ICD-10-CM

## 2021-06-24 DIAGNOSIS — I71.20 THORACIC AORTIC ANEURYSM WITHOUT RUPTURE (CMD): ICD-10-CM

## 2021-06-24 PROCEDURE — 99214 OFFICE O/P EST MOD 30 MIN: CPT | Performed by: INTERNAL MEDICINE

## 2021-06-24 ASSESSMENT — PATIENT HEALTH QUESTIONNAIRE - PHQ9
2. FEELING DOWN, DEPRESSED OR HOPELESS: NOT AT ALL
1. LITTLE INTEREST OR PLEASURE IN DOING THINGS: NOT AT ALL
SUM OF ALL RESPONSES TO PHQ9 QUESTIONS 1 AND 2: 0
CLINICAL INTERPRETATION OF PHQ9 SCORE: NO FURTHER SCREENING NEEDED
CLINICAL INTERPRETATION OF PHQ2 SCORE: NO FURTHER SCREENING NEEDED
SUM OF ALL RESPONSES TO PHQ9 QUESTIONS 1 AND 2: 0

## 2021-07-15 ENCOUNTER — OFFICE VISIT (OUTPATIENT)
Dept: OTOLARYNGOLOGY | Facility: CLINIC | Age: 86
End: 2021-07-15
Payer: MEDICARE

## 2021-07-15 VITALS
SYSTOLIC BLOOD PRESSURE: 128 MMHG | DIASTOLIC BLOOD PRESSURE: 72 MMHG | HEIGHT: 63 IN | WEIGHT: 160 LBS | BODY MASS INDEX: 28.35 KG/M2 | TEMPERATURE: 99 F

## 2021-07-15 DIAGNOSIS — H61.23 BILATERAL IMPACTED CERUMEN: Primary | ICD-10-CM

## 2021-07-15 PROCEDURE — 69210 REMOVE IMPACTED EAR WAX UNI: CPT | Performed by: OTOLARYNGOLOGY

## 2021-07-15 NOTE — PROGRESS NOTES
Raiza Armenta is a 80year old male. Patient presents with:  Ear Problem: pt presents today for both ear cleaning on both ears.        HISTORY OF PRESENT ILLNESS    Patient presents for cerumen removal. No other complaints or concerns at this time    Social with compression fractures   • Pacemaker    • Prostate cancer [facility])     s/p prostatectomy -2003 - Urologist - Dr. Gladys Castellanos   • Pulmonary nodule     1.6 cm - stable x 3 years -no further f/up needed   • Pulmonary nodule     new pulm nodule- repeat CT chest i Location: [facility] [place]   • PATIENT [address] FOR IV ANTIBIOTIC SURGICAL SITE INFECTION PROPHYLAXIS.   10/17/2012    Procedure: COLONOSCOPY, POSSIBLE BIOPSY, POSSIBLE POLYPECTOMY 87811;  Surgeon: Leroy De La Cruz MD;  Location: [facility] examined via otomicroscopy. PROCEDURE:    Removal of cerumen impaction   The cerumen impaction was completely removed using microscopy. Removal was completed by using acurette and/or suction. Comments: Return to clinic as needed.   Avoid q-tips, wa

## 2021-08-09 ENCOUNTER — APPOINTMENT (OUTPATIENT)
Dept: GENERAL RADIOLOGY | Facility: HOSPITAL | Age: 86
End: 2021-08-09
Attending: EMERGENCY MEDICINE
Payer: MEDICARE

## 2021-08-09 ENCOUNTER — APPOINTMENT (OUTPATIENT)
Dept: CT IMAGING | Facility: HOSPITAL | Age: 86
End: 2021-08-09
Attending: EMERGENCY MEDICINE
Payer: MEDICARE

## 2021-08-09 ENCOUNTER — HOSPITAL ENCOUNTER (EMERGENCY)
Facility: HOSPITAL | Age: 86
Discharge: HOME | End: 2021-08-09
Attending: EMERGENCY MEDICINE
Payer: MEDICARE

## 2021-08-09 VITALS
WEIGHT: 162 LBS | DIASTOLIC BLOOD PRESSURE: 77 MMHG | TEMPERATURE: 97 F | HEART RATE: 72 BPM | SYSTOLIC BLOOD PRESSURE: 128 MMHG | HEIGHT: 65 IN | RESPIRATION RATE: 18 BRPM | BODY MASS INDEX: 26.99 KG/M2 | OXYGEN SATURATION: 96 %

## 2021-08-09 DIAGNOSIS — T07.XXXA ABRASIONS OF MULTIPLE SITES: ICD-10-CM

## 2021-08-09 DIAGNOSIS — S00.03XA HEMATOMA OF SCALP, INITIAL ENCOUNTER: ICD-10-CM

## 2021-08-09 DIAGNOSIS — S09.90XA INJURY OF HEAD, INITIAL ENCOUNTER: Primary | ICD-10-CM

## 2021-08-09 LAB
ANION GAP SERPL CALC-SCNC: 7 MMOL/L (ref 0–18)
BASOPHILS # BLD AUTO: 0.02 X10(3) UL (ref 0–0.2)
BASOPHILS NFR BLD AUTO: 0.5 %
BUN BLD-MCNC: 29 MG/DL (ref 7–18)
BUN/CREAT SERPL: 23.2 (ref 10–20)
CALCIUM BLD-MCNC: 8.4 MG/DL (ref 8.5–10.1)
CHLORIDE SERPL-SCNC: 110 MMOL/L (ref 98–112)
CO2 SERPL-SCNC: 24 MMOL/L (ref 21–32)
CREAT BLD-MCNC: 1.25 MG/DL
DEPRECATED RDW RBC AUTO: 50.7 FL (ref 35.1–46.3)
EOSINOPHIL # BLD AUTO: 0.09 X10(3) UL (ref 0–0.7)
EOSINOPHIL NFR BLD AUTO: 2.1 %
ERYTHROCYTE [DISTWIDTH] IN BLOOD BY AUTOMATED COUNT: 14.6 % (ref 11–15)
GLUCOSE BLD-MCNC: 97 MG/DL (ref 70–99)
HCT VFR BLD AUTO: 37.3 %
HGB BLD-MCNC: 12.1 G/DL
IMM GRANULOCYTES # BLD AUTO: 0.01 X10(3) UL (ref 0–1)
IMM GRANULOCYTES NFR BLD: 0.2 %
INR BLD: 1.58 (ref 0.9–1.2)
LYMPHOCYTES # BLD AUTO: 1.04 X10(3) UL (ref 1–4)
LYMPHOCYTES NFR BLD AUTO: 23.7 %
MCH RBC QN AUTO: 30.6 PG (ref 26–34)
MCHC RBC AUTO-ENTMCNC: 32.4 G/DL (ref 31–37)
MCV RBC AUTO: 94.2 FL
MONOCYTES # BLD AUTO: 0.59 X10(3) UL (ref 0.1–1)
MONOCYTES NFR BLD AUTO: 13.5 %
NEUTROPHILS # BLD AUTO: 2.63 X10 (3) UL (ref 1.5–7.7)
NEUTROPHILS # BLD AUTO: 2.63 X10(3) UL (ref 1.5–7.7)
NEUTROPHILS NFR BLD AUTO: 60 %
OSMOLALITY SERPL CALC.SUM OF ELEC: 298 MOSM/KG (ref 275–295)
PLATELET # BLD AUTO: 138 10(3)UL (ref 150–450)
POTASSIUM SERPL-SCNC: 4.4 MMOL/L (ref 3.5–5.1)
PROTHROMBIN TIME: 18.6 SECONDS (ref 11.8–14.5)
RBC # BLD AUTO: 3.96 X10(6)UL
SODIUM SERPL-SCNC: 141 MMOL/L (ref 136–145)
WBC # BLD AUTO: 4.4 X10(3) UL (ref 4–11)

## 2021-08-09 PROCEDURE — 73030 X-RAY EXAM OF SHOULDER: CPT | Performed by: EMERGENCY MEDICINE

## 2021-08-09 PROCEDURE — 73560 X-RAY EXAM OF KNEE 1 OR 2: CPT | Performed by: EMERGENCY MEDICINE

## 2021-08-09 PROCEDURE — 85025 COMPLETE CBC W/AUTO DIFF WBC: CPT | Performed by: EMERGENCY MEDICINE

## 2021-08-09 PROCEDURE — 36415 COLL VENOUS BLD VENIPUNCTURE: CPT

## 2021-08-09 PROCEDURE — 99284 EMERGENCY DEPT VISIT MOD MDM: CPT

## 2021-08-09 PROCEDURE — 72125 CT NECK SPINE W/O DYE: CPT | Performed by: EMERGENCY MEDICINE

## 2021-08-09 PROCEDURE — 80048 BASIC METABOLIC PNL TOTAL CA: CPT | Performed by: EMERGENCY MEDICINE

## 2021-08-09 PROCEDURE — 85610 PROTHROMBIN TIME: CPT | Performed by: EMERGENCY MEDICINE

## 2021-08-09 PROCEDURE — 70450 CT HEAD/BRAIN W/O DYE: CPT | Performed by: EMERGENCY MEDICINE

## 2021-08-09 PROCEDURE — 73080 X-RAY EXAM OF ELBOW: CPT | Performed by: EMERGENCY MEDICINE

## 2021-08-09 RX ORDER — ACETAMINOPHEN 500 MG
1000 TABLET ORAL ONCE
Status: COMPLETED | OUTPATIENT
Start: 2021-08-09 | End: 2021-08-09

## 2021-08-09 NOTE — ED PROVIDER NOTES
Patient Seen in: [facility] Emergency Department      History   Patient presents with:  Fall    Stated Complaint: fall    HPI/Subjective:   HPI    Patient presents to the emergency department after a fall.   He states he lost his balance while step a-fib ablation -> 2008   • BACK SURGERY      kyphoplasty - 7/72097   • CARDIAC PACEMAKER PLACEMENT      July 2016 last checked 4/2017   • COLONOSCOPY N/A 9/20/2017    Procedure: COLONOSCOPY, POSSIBLE BIOPSY, POSSIBLE POLYPECTOMY 67361;  Surgeon: Obed Perez Tobacco Use      Smoking status: Former Smoker        Years: 10.00      Smokeless tobacco: Never Used    Vaping Use      Vaping Use: Never used    Alcohol use: Yes      Alcohol/week: 0.0 - 1.0 standard drinks      Comment: 2 beers a month    Drug use:  No Neurovascularly intact in all extremities. Skin:     General: Skin is warm and dry. Capillary Refill: Capillary refill takes less than 2 seconds. Findings: No rash. Neurological:      General: No focal deficit present.       Mental Status: He mild degenerative narrowing of the patellofemoral joint.     Dictated by (CST): Godfrey Sawyer MD on 8/09/2021 at 4:01 PM     Finalized by (CST): Godfrey Sawyer MD on 8/09/2021 at 4:02 PM          XR ELBOW, COMPLETE (MIN 3 VIEWS), LEFT (CPT=73080)    Result Clinical Impression:  Injury of head, initial encounter  (primary encounter diagnosis)  Hematoma of scalp, initial encounter  Abrasions of multiple sites     Disposition:  Discharge  8/9/2021  4:51 pm    Follow-up:  Salome Ang MD  [address]

## 2021-08-09 NOTE — ED INITIAL ASSESSMENT (HPI)
Pt BIBEMS from home s/p missing last step and falling to the ground, skin tears to L knee, L elbow, and L shoulder, hematoma to L side of scalp, pt on xarelto. Pt AOxself only, speaking in full sentences.

## 2021-08-19 PROBLEM — S51.012A SKIN TEAR OF LEFT ELBOW WITHOUT COMPLICATION, INITIAL ENCOUNTER: Status: ACTIVE | Noted: 2021-08-19

## 2021-08-19 PROBLEM — S00.93XA TRAUMATIC HEMATOMA OF HEAD, INITIAL ENCOUNTER: Status: ACTIVE | Noted: 2021-08-19

## 2021-08-25 ENCOUNTER — HOSPITAL ENCOUNTER (OUTPATIENT)
Dept: CT IMAGING | Facility: HOSPITAL | Age: 86
Discharge: HOME | End: 2021-08-25
Attending: INTERNAL MEDICINE
Payer: MEDICARE

## 2021-08-25 DIAGNOSIS — I48.21 PERMANENT ATRIAL FIBRILLATION (HCC): ICD-10-CM

## 2021-08-25 DIAGNOSIS — I71.2 THORACIC AORTIC ANEURYSM (HCC): ICD-10-CM

## 2021-08-25 DIAGNOSIS — I25.10 CAD (CORONARY ARTERY DISEASE): ICD-10-CM

## 2021-08-25 PROCEDURE — 71275 CT ANGIOGRAPHY CHEST: CPT | Performed by: INTERNAL MEDICINE

## 2021-09-10 ENCOUNTER — APPOINTMENT (OUTPATIENT)
Dept: CARDIOLOGY | Age: 86
End: 2021-09-10

## 2021-11-01 ENCOUNTER — LAB ENCOUNTER (OUTPATIENT)
Dept: LAB | Facility: HOSPITAL | Age: 86
End: 2021-11-01
Attending: UROLOGY
Payer: MEDICARE

## 2021-11-01 DIAGNOSIS — N13.9 OBSTRUCTIVE UROPATHY: ICD-10-CM

## 2021-11-01 DIAGNOSIS — C61 PROSTATE CANCER (HCC): ICD-10-CM

## 2021-11-01 PROCEDURE — 36415 COLL VENOUS BLD VENIPUNCTURE: CPT

## 2021-11-01 PROCEDURE — 84153 ASSAY OF PSA TOTAL: CPT

## 2021-11-01 PROCEDURE — 82565 ASSAY OF CREATININE: CPT

## 2021-12-13 ENCOUNTER — TELEPHONE (OUTPATIENT)
Dept: CARDIOLOGY | Age: 86
End: 2021-12-13

## 2021-12-21 NOTE — H&P
I saw and examined the patient and agree with the attached assessment and plan. I discussed the risks and benefits with patient extensively and he was agreeable to proceed with pre-watchman HELEN.     Isom Severs, MD  [address] [place] [address]

## 2022-01-03 ENCOUNTER — LAB ENCOUNTER (OUTPATIENT)
Dept: LAB | Facility: HOSPITAL | Age: 87
End: 2022-01-03
Attending: STUDENT IN AN ORGANIZED HEALTH CARE EDUCATION/TRAINING PROGRAM
Payer: MEDICARE

## 2022-01-03 DIAGNOSIS — Z01.818 PRE-OP TESTING: ICD-10-CM

## 2022-01-03 DIAGNOSIS — D47.2 MONOCLONAL PARAPROTEINEMIA: Primary | ICD-10-CM

## 2022-01-03 DIAGNOSIS — D47.2 MGUS (MONOCLONAL GAMMOPATHY OF UNKNOWN SIGNIFICANCE): ICD-10-CM

## 2022-01-03 DIAGNOSIS — E03.9 HYPOTHYROIDISM, UNSPECIFIED TYPE: ICD-10-CM

## 2022-01-03 LAB
ALBUMIN SERPL-MCNC: 3.3 G/DL (ref 3.4–5)
ALBUMIN/GLOB SERPL: 1 {RATIO} (ref 1–2)
ALP LIVER SERPL-CCNC: 51 U/L
ALT SERPL-CCNC: 19 U/L
ANION GAP SERPL CALC-SCNC: 5 MMOL/L (ref 0–18)
AST SERPL-CCNC: 22 U/L (ref 15–37)
BASOPHILS # BLD AUTO: 0.02 X10(3) UL (ref 0–0.2)
BASOPHILS NFR BLD AUTO: 0.5 %
BILIRUB SERPL-MCNC: 1 MG/DL (ref 0.1–2)
BUN BLD-MCNC: 32 MG/DL (ref 7–18)
BUN/CREAT SERPL: 24.8 (ref 10–20)
CALCIUM BLD-MCNC: 8.8 MG/DL (ref 8.5–10.1)
CHLORIDE SERPL-SCNC: 109 MMOL/L (ref 98–112)
CO2 SERPL-SCNC: 27 MMOL/L (ref 21–32)
CREAT BLD-MCNC: 1.29 MG/DL
DEPRECATED RDW RBC AUTO: 54.2 FL (ref 35.1–46.3)
EOSINOPHIL # BLD AUTO: 0.08 X10(3) UL (ref 0–0.7)
EOSINOPHIL NFR BLD AUTO: 2.1 %
ERYTHROCYTE [DISTWIDTH] IN BLOOD BY AUTOMATED COUNT: 15.2 % (ref 11–15)
FASTING STATUS PATIENT QL REPORTED: YES
GLOBULIN PLAS-MCNC: 3.2 G/DL (ref 2.8–4.4)
GLUCOSE BLD-MCNC: 90 MG/DL (ref 70–99)
HCT VFR BLD AUTO: 38.8 %
HGB BLD-MCNC: 12.3 G/DL
IMM GRANULOCYTES # BLD AUTO: 0.01 X10(3) UL (ref 0–1)
IMM GRANULOCYTES NFR BLD: 0.3 %
LYMPHOCYTES # BLD AUTO: 0.82 X10(3) UL (ref 1–4)
LYMPHOCYTES NFR BLD AUTO: 21.5 %
MCH RBC QN AUTO: 30.2 PG (ref 26–34)
MCHC RBC AUTO-ENTMCNC: 31.7 G/DL (ref 31–37)
MCV RBC AUTO: 95.3 FL
MONOCYTES # BLD AUTO: 0.41 X10(3) UL (ref 0.1–1)
MONOCYTES NFR BLD AUTO: 10.7 %
NEUTROPHILS # BLD AUTO: 2.48 X10 (3) UL (ref 1.5–7.7)
NEUTROPHILS # BLD AUTO: 2.48 X10(3) UL (ref 1.5–7.7)
NEUTROPHILS NFR BLD AUTO: 64.9 %
OSMOLALITY SERPL CALC.SUM OF ELEC: 298 MOSM/KG (ref 275–295)
PLATELET # BLD AUTO: 151 10(3)UL (ref 150–450)
POTASSIUM SERPL-SCNC: 4.3 MMOL/L (ref 3.5–5.1)
PROT SERPL-MCNC: 6.5 G/DL (ref 6.4–8.2)
RBC # BLD AUTO: 4.07 X10(6)UL
SODIUM SERPL-SCNC: 141 MMOL/L (ref 136–145)
TSI SER-ACNC: 6.84 MIU/ML (ref 0.36–3.74)
WBC # BLD AUTO: 3.8 X10(3) UL (ref 4–11)

## 2022-01-03 PROCEDURE — 84165 PROTEIN E-PHORESIS SERUM: CPT

## 2022-01-03 PROCEDURE — 36415 COLL VENOUS BLD VENIPUNCTURE: CPT

## 2022-01-03 PROCEDURE — 83521 IG LIGHT CHAINS FREE EACH: CPT

## 2022-01-03 PROCEDURE — 84443 ASSAY THYROID STIM HORMONE: CPT

## 2022-01-03 PROCEDURE — 83520 IMMUNOASSAY QUANT NOS NONAB: CPT

## 2022-01-03 PROCEDURE — 85025 COMPLETE CBC W/AUTO DIFF WBC: CPT

## 2022-01-03 PROCEDURE — 80053 COMPREHEN METABOLIC PANEL: CPT

## 2022-01-03 PROCEDURE — 86334 IMMUNOFIX E-PHORESIS SERUM: CPT

## 2022-01-04 LAB
KAPPA QNT FREE LIGHT CHAINS: 42.42 MG/L
KAPPA/LAMBDA FREE LIGHT CHAIN RATIO: 2.23
LAMBDA QNT FREE LIGHT CHAINS: 19 MG/L

## 2022-01-06 ENCOUNTER — HOSPITAL ENCOUNTER (OUTPATIENT)
Dept: CV DIAGNOSTICS | Facility: HOSPITAL | Age: 87
Discharge: HOME | End: 2022-01-06
Attending: INTERNAL MEDICINE | Admitting: INTERNAL MEDICINE
Payer: MEDICARE

## 2022-01-06 ENCOUNTER — HOSPITAL ENCOUNTER (OUTPATIENT)
Dept: INTERVENTIONAL RADIOLOGY/VASCULAR | Facility: HOSPITAL | Age: 87
Discharge: HOME | End: 2022-01-06
Attending: INTERNAL MEDICINE | Admitting: INTERNAL MEDICINE
Payer: MEDICARE

## 2022-01-06 VITALS
OXYGEN SATURATION: 94 % | HEIGHT: 66 IN | SYSTOLIC BLOOD PRESSURE: 118 MMHG | WEIGHT: 154 LBS | RESPIRATION RATE: 18 BRPM | HEART RATE: 70 BPM | DIASTOLIC BLOOD PRESSURE: 79 MMHG | BODY MASS INDEX: 24.75 KG/M2

## 2022-01-06 DIAGNOSIS — Z01.810 PRE-PROCEDURAL CARDIOVASCULAR EXAMINATION: ICD-10-CM

## 2022-01-06 DIAGNOSIS — Z01.818 PRE-OP TESTING: Primary | ICD-10-CM

## 2022-01-06 DIAGNOSIS — I48.91 A-FIB (HCC): ICD-10-CM

## 2022-01-06 LAB — SARS-COV-2 RNA RESP QL NAA+PROBE: NOT DETECTED

## 2022-01-06 PROCEDURE — 93312 ECHO TRANSESOPHAGEAL: CPT

## 2022-01-06 PROCEDURE — 93325 DOPPLER ECHO COLOR FLOW MAPG: CPT | Performed by: INTERNAL MEDICINE

## 2022-01-06 PROCEDURE — 93320 DOPPLER ECHO COMPLETE: CPT | Performed by: INTERNAL MEDICINE

## 2022-01-06 PROCEDURE — B24BZZ4 ULTRASONOGRAPHY OF HEART WITH AORTA, TRANSESOPHAGEAL: ICD-10-PCS | Performed by: INTERNAL MEDICINE

## 2022-01-06 PROCEDURE — 99152 MOD SED SAME PHYS/QHP 5/>YRS: CPT

## 2022-01-06 PROCEDURE — 36415 COLL VENOUS BLD VENIPUNCTURE: CPT

## 2022-01-06 RX ORDER — MIDAZOLAM HYDROCHLORIDE 1 MG/ML
INJECTION INTRAMUSCULAR; INTRAVENOUS
Status: COMPLETED
Start: 2022-01-06 | End: 2022-01-06

## 2022-01-06 RX ORDER — FUROSEMIDE 20 MG/1
20 TABLET ORAL DAILY
COMMUNITY

## 2022-01-06 RX ORDER — SODIUM CHLORIDE 0.9 % (FLUSH) 0.9 %
10 SYRINGE (ML) INJECTION AS NEEDED
Status: DISCONTINUED | OUTPATIENT
Start: 2022-01-06 | End: 2022-01-06

## 2022-01-06 RX ORDER — SODIUM CHLORIDE 9 MG/ML
INJECTION, SOLUTION INTRAVENOUS
Status: COMPLETED | OUTPATIENT
Start: 2022-01-06 | End: 2022-01-06

## 2022-01-06 RX ORDER — MIDAZOLAM HYDROCHLORIDE 1 MG/ML
INJECTION INTRAMUSCULAR; INTRAVENOUS
Status: DISCONTINUED
Start: 2022-01-06 | End: 2022-01-06

## 2022-01-06 RX ORDER — MIDAZOLAM HYDROCHLORIDE 1 MG/ML
2 INJECTION INTRAMUSCULAR; INTRAVENOUS ONCE
Status: COMPLETED | OUTPATIENT
Start: 2022-01-06 | End: 2022-01-06

## 2022-01-06 RX ORDER — LIDOCAINE HYDROCHLORIDE 20 MG/ML
SOLUTION OROPHARYNGEAL
Status: COMPLETED
Start: 2022-01-06 | End: 2022-01-06

## 2022-01-06 RX ADMIN — LIDOCAINE HYDROCHLORIDE 15 ML: 20 SOLUTION OROPHARYNGEAL at 09:46:00

## 2022-01-06 RX ADMIN — MIDAZOLAM HYDROCHLORIDE 2 MG: 1 INJECTION INTRAMUSCULAR; INTRAVENOUS at 09:46:00

## 2022-01-06 RX ADMIN — SODIUM CHLORIDE: 9 INJECTION, SOLUTION INTRAVENOUS at 08:36:00

## 2022-01-06 NOTE — IVS NOTE
DISCHARGE NOTE     Pt is able to sit up and ambulate without difficulty. No sore throat, shortness of breath, or neck pain. IV access removed  Instruction provided, patient/family verbalizes understanding.    Dr. Kayode Black spoke with patient/family post pr

## 2022-01-07 LAB
ALBUMIN SERPL ELPH-MCNC: 3.57 G/DL (ref 3.75–5.21)
ALBUMIN/GLOB SERPL: 1.26 {RATIO} (ref 1–2)
ALPHA1 GLOB SERPL ELPH-MCNC: 0.27 G/DL (ref 0.19–0.46)
ALPHA2 GLOB SERPL ELPH-MCNC: 0.7 G/DL (ref 0.48–1.05)
B-GLOBULIN SERPL ELPH-MCNC: 0.77 G/DL (ref 0.68–1.23)
GAMMA GLOB SERPL ELPH-MCNC: 1.09 G/DL (ref 0.62–1.7)
KAPPA LC FREE SER-MCNC: 4.17 MG/DL (ref 0.33–1.94)
KAPPA LC FREE/LAMBDA FREE SER NEPH: 2.39 {RATIO} (ref 0.26–1.65)
LAMBDA LC FREE SERPL-MCNC: 1.74 MG/DL (ref 0.57–2.63)
M-SPIKE 1: 0.2 G/DL (ref ?–0)
PROT SERPL-MCNC: 6.4 G/DL (ref 6.4–8.2)

## 2022-01-11 NOTE — PROGRESS NOTES
MD advised to hold the results for the patient's next office visit. Nothing further needed from MD or nurse. Closing encounter.     Future Appointments  1/13/2022  2:00 PM    Kev Dinero MD         [address] [facility] [address]  1/27/2022  1:00 PM    [facility]

## 2022-01-13 ENCOUNTER — OFFICE VISIT (OUTPATIENT)
Dept: OTOLARYNGOLOGY | Facility: CLINIC | Age: 87
End: 2022-01-13
Payer: MEDICARE

## 2022-01-13 VITALS — HEIGHT: 66 IN | WEIGHT: 154 LBS | BODY MASS INDEX: 24.75 KG/M2

## 2022-01-13 DIAGNOSIS — H61.23 BILATERAL IMPACTED CERUMEN: Primary | ICD-10-CM

## 2022-01-13 PROCEDURE — 69210 REMOVE IMPACTED EAR WAX UNI: CPT | Performed by: OTOLARYNGOLOGY

## 2022-01-13 NOTE — PROGRESS NOTES
Kalie Sierra is a 80year old male.   Patient presents with:  Ear Wax: pt is here for ear cleaning       HISTORY OF PRESENT ILLNESS    Patient presents for cerumen removal. No other complaints or concerns at this time    Social History    Socioeconomic Histo further f/up needed   • Pulmonary nodule     new pulm nodule- repeat CT chest in 7/2018   • Sacral fracture [facility])    • Shingles    • Thrombocytopenia ([facility] [address] 4/20/2011   • Visual impairment     glasses       Past Surgical History:   Procedure Laterality Date BIOPSY, POSSIBLE POLYPECTOMY 22022;  Surgeon: Marti Sandoval MD;  Location: [address] [place] [address]      left hip   • UPPER GI ENDOSCOPY,BIOPSY  9/12/2011    Performed by LUDA MANUEL at [facility] wax remedies as needed. Current Outpatient Medications:   •  furosemide 20 MG Oral Tab, Take 20 mg by mouth daily. , Disp: , Rfl:   •  levothyroxine 100 MCG Oral Tab, Take 1 tablet (100 mcg total) by mouth daily. , Disp: 90 tablet, Rfl: 3  •  Denosumab

## 2022-01-19 ENCOUNTER — TELEPHONE (OUTPATIENT)
Dept: INTERVENTIONAL RADIOLOGY/VASCULAR | Facility: HOSPITAL | Age: 87
End: 2022-01-19

## 2022-01-19 NOTE — TELEPHONE ENCOUNTER
Called patient to discuss Watchman. They are heading to an appointment. I will call them back this afternoon.

## 2022-01-19 NOTE — TELEPHONE ENCOUNTER
Talked to patients wife, Saman Lombardi. Watchman process and procedure explained. Implant date of 3/4 given. Will get PAT's and Covid test on 3/1. They know to call central scheduling.  Encouraged to call with questions

## 2022-01-27 PROBLEM — I50.33 ACUTE ON CHRONIC DIASTOLIC CONGESTIVE HEART FAILURE (HCC): Status: ACTIVE | Noted: 2022-01-27

## 2022-02-07 ENCOUNTER — HOSPITAL ENCOUNTER (OUTPATIENT)
Dept: CT IMAGING | Facility: HOSPITAL | Age: 87
Discharge: HOME | End: 2022-02-07
Attending: INTERNAL MEDICINE
Payer: MEDICARE

## 2022-02-07 DIAGNOSIS — R41.3 MEMORY LOSS: ICD-10-CM

## 2022-02-07 PROCEDURE — 70450 CT HEAD/BRAIN W/O DYE: CPT | Performed by: INTERNAL MEDICINE

## 2022-02-15 NOTE — PROGRESS NOTES
Patient's wife Chet Iván calling in for test results, on HIPAA    Results discussed  Wife verbalized understanding

## 2022-02-26 ENCOUNTER — APPOINTMENT (OUTPATIENT)
Dept: GENERAL RADIOLOGY | Facility: HOSPITAL | Age: 87
End: 2022-02-26
Attending: EMERGENCY MEDICINE
Payer: MEDICARE

## 2022-02-26 ENCOUNTER — HOSPITAL ENCOUNTER (OUTPATIENT)
Age: 87
Discharge: OTHER INSTITUTION | End: 2022-02-26
Attending: EMERGENCY MEDICINE
Payer: MEDICARE

## 2022-02-26 ENCOUNTER — HOSPITAL ENCOUNTER (EMERGENCY)
Facility: HOSPITAL | Age: 87
Discharge: HOME | End: 2022-02-26
Attending: EMERGENCY MEDICINE
Payer: MEDICARE

## 2022-02-26 VITALS
RESPIRATION RATE: 18 BRPM | WEIGHT: 160 LBS | TEMPERATURE: 98 F | BODY MASS INDEX: 25.71 KG/M2 | DIASTOLIC BLOOD PRESSURE: 70 MMHG | HEART RATE: 70 BPM | SYSTOLIC BLOOD PRESSURE: 121 MMHG | HEIGHT: 66 IN | OXYGEN SATURATION: 94 %

## 2022-02-26 VITALS
DIASTOLIC BLOOD PRESSURE: 81 MMHG | HEART RATE: 70 BPM | OXYGEN SATURATION: 98 % | RESPIRATION RATE: 22 BRPM | SYSTOLIC BLOOD PRESSURE: 141 MMHG | TEMPERATURE: 97 F

## 2022-02-26 DIAGNOSIS — R06.2 WHEEZING: Primary | ICD-10-CM

## 2022-02-26 DIAGNOSIS — I50.9 ACUTE ON CHRONIC HEART FAILURE, UNSPECIFIED HEART FAILURE TYPE (HCC): Primary | ICD-10-CM

## 2022-02-26 DIAGNOSIS — R06.02 SHORTNESS OF BREATH: ICD-10-CM

## 2022-02-26 LAB
ANION GAP SERPL CALC-SCNC: 6 MMOL/L (ref 0–18)
BASOPHILS # BLD AUTO: 0.02 X10(3) UL (ref 0–0.2)
BASOPHILS NFR BLD AUTO: 0.5 %
BUN BLD-MCNC: 28 MG/DL (ref 7–18)
BUN/CREAT SERPL: 29.2 (ref 10–20)
CALCIUM BLD-MCNC: 8.8 MG/DL (ref 8.5–10.1)
CHLORIDE SERPL-SCNC: 109 MMOL/L (ref 98–112)
CO2 SERPL-SCNC: 26 MMOL/L (ref 21–32)
CREAT BLD-MCNC: 0.96 MG/DL
DEPRECATED RDW RBC AUTO: 53.2 FL (ref 35.1–46.3)
EOSINOPHIL # BLD AUTO: 0.09 X10(3) UL (ref 0–0.7)
EOSINOPHIL NFR BLD AUTO: 2.5 %
ERYTHROCYTE [DISTWIDTH] IN BLOOD BY AUTOMATED COUNT: 15.4 % (ref 11–15)
GLUCOSE BLD-MCNC: 91 MG/DL (ref 70–99)
HCT VFR BLD AUTO: 36.7 %
HGB BLD-MCNC: 11.7 G/DL
IMM GRANULOCYTES # BLD AUTO: 0.01 X10(3) UL (ref 0–1)
IMM GRANULOCYTES NFR BLD: 0.3 %
LYMPHOCYTES # BLD AUTO: 0.65 X10(3) UL (ref 1–4)
LYMPHOCYTES NFR BLD AUTO: 17.8 %
MCH RBC QN AUTO: 30.3 PG (ref 26–34)
MCHC RBC AUTO-ENTMCNC: 31.9 G/DL (ref 31–37)
MCV RBC AUTO: 95.1 FL
MONOCYTES # BLD AUTO: 0.69 X10(3) UL (ref 0.1–1)
MONOCYTES NFR BLD AUTO: 18.9 %
NEUTROPHILS # BLD AUTO: 2.2 X10 (3) UL (ref 1.5–7.7)
NEUTROPHILS NFR BLD AUTO: 60 %
NT-PROBNP SERPL-MCNC: 4427 PG/ML (ref ?–450)
OSMOLALITY SERPL CALC.SUM OF ELEC: 297 MOSM/KG (ref 275–295)
PLATELET # BLD AUTO: 125 10(3)UL (ref 150–450)
POTASSIUM SERPL-SCNC: 4.5 MMOL/L (ref 3.5–5.1)
RBC # BLD AUTO: 3.86 X10(6)UL
SARS-COV-2 RNA RESP QL NAA+PROBE: NOT DETECTED
SODIUM SERPL-SCNC: 141 MMOL/L (ref 136–145)
WBC # BLD AUTO: 3.7 X10(3) UL (ref 4–11)

## 2022-02-26 PROCEDURE — 99214 OFFICE O/P EST MOD 30 MIN: CPT

## 2022-02-26 PROCEDURE — 71045 X-RAY EXAM CHEST 1 VIEW: CPT | Performed by: EMERGENCY MEDICINE

## 2022-02-26 PROCEDURE — 99284 EMERGENCY DEPT VISIT MOD MDM: CPT

## 2022-02-26 PROCEDURE — 83880 ASSAY OF NATRIURETIC PEPTIDE: CPT | Performed by: EMERGENCY MEDICINE

## 2022-02-26 PROCEDURE — 85025 COMPLETE CBC W/AUTO DIFF WBC: CPT | Performed by: EMERGENCY MEDICINE

## 2022-02-26 PROCEDURE — 99213 OFFICE O/P EST LOW 20 MIN: CPT

## 2022-02-26 PROCEDURE — 80048 BASIC METABOLIC PNL TOTAL CA: CPT | Performed by: EMERGENCY MEDICINE

## 2022-02-26 PROCEDURE — 96374 THER/PROPH/DIAG INJ IV PUSH: CPT

## 2022-02-26 RX ORDER — FUROSEMIDE 10 MG/ML
40 INJECTION INTRAMUSCULAR; INTRAVENOUS ONCE
Status: COMPLETED | OUTPATIENT
Start: 2022-02-26 | End: 2022-02-26

## 2022-02-26 RX ORDER — FUROSEMIDE 20 MG/1
20 TABLET ORAL EVERY MORNING
Qty: 7 TABLET | Refills: 0 | Status: SHIPPED | OUTPATIENT
Start: 2022-02-26 | End: 2022-03-05

## 2022-02-26 NOTE — ED INITIAL ASSESSMENT (HPI)
Patient states he was sent form [facility] to have a chest XR due to wheezing. . states he has had \"a cold\" for the past 4 days.

## 2022-02-28 ENCOUNTER — TELEPHONE (OUTPATIENT)
Dept: INTERVENTIONAL RADIOLOGY/VASCULAR | Facility: HOSPITAL | Age: 87
End: 2022-02-28

## 2022-03-01 ENCOUNTER — LAB ENCOUNTER (OUTPATIENT)
Dept: LAB | Facility: HOSPITAL | Age: 87
End: 2022-03-01
Attending: INTERNAL MEDICINE
Payer: MEDICARE

## 2022-03-01 ENCOUNTER — HOSPITAL ENCOUNTER (OUTPATIENT)
Dept: GENERAL RADIOLOGY | Facility: HOSPITAL | Age: 87
Discharge: HOME | End: 2022-03-01
Attending: INTERNAL MEDICINE
Payer: MEDICARE

## 2022-03-01 DIAGNOSIS — I48.91 A-FIB (HCC): ICD-10-CM

## 2022-03-01 LAB
ANION GAP SERPL CALC-SCNC: 5 MMOL/L (ref 0–18)
ANTIBODY SCREEN: NEGATIVE
BASOPHILS # BLD AUTO: 0.01 X10(3) UL (ref 0–0.2)
BASOPHILS NFR BLD AUTO: 0.2 %
BUN BLD-MCNC: 37 MG/DL (ref 7–18)
BUN/CREAT SERPL: 30.1 (ref 10–20)
CALCIUM BLD-MCNC: 8.7 MG/DL (ref 8.5–10.1)
CHLORIDE SERPL-SCNC: 106 MMOL/L (ref 98–112)
CO2 SERPL-SCNC: 29 MMOL/L (ref 21–32)
CREAT BLD-MCNC: 1.23 MG/DL
DEPRECATED RDW RBC AUTO: 53.1 FL (ref 35.1–46.3)
EOSINOPHIL # BLD AUTO: 0.12 X10(3) UL (ref 0–0.7)
EOSINOPHIL NFR BLD AUTO: 2.8 %
ERYTHROCYTE [DISTWIDTH] IN BLOOD BY AUTOMATED COUNT: 15 % (ref 11–15)
GLUCOSE BLD-MCNC: 79 MG/DL (ref 70–99)
HCT VFR BLD AUTO: 40 %
HGB BLD-MCNC: 12.5 G/DL
IMM GRANULOCYTES # BLD AUTO: 0.02 X10(3) UL (ref 0–1)
IMM GRANULOCYTES NFR BLD: 0.5 %
LYMPHOCYTES # BLD AUTO: 0.79 X10(3) UL (ref 1–4)
LYMPHOCYTES NFR BLD AUTO: 18.3 %
MCH RBC QN AUTO: 30 PG (ref 26–34)
MCHC RBC AUTO-ENTMCNC: 31.3 G/DL (ref 31–37)
MCV RBC AUTO: 96.2 FL
MONOCYTES # BLD AUTO: 0.49 X10(3) UL (ref 0.1–1)
MONOCYTES NFR BLD AUTO: 11.4 %
NEUTROPHILS # BLD AUTO: 2.88 X10 (3) UL (ref 1.5–7.7)
NEUTROPHILS # BLD AUTO: 2.88 X10(3) UL (ref 1.5–7.7)
NEUTROPHILS NFR BLD AUTO: 66.8 %
OSMOLALITY SERPL CALC.SUM OF ELEC: 298 MOSM/KG (ref 275–295)
PLATELET # BLD AUTO: 138 10(3)UL (ref 150–450)
POTASSIUM SERPL-SCNC: 4.2 MMOL/L (ref 3.5–5.1)
RBC # BLD AUTO: 4.16 X10(6)UL
RH BLOOD TYPE: POSITIVE
SODIUM SERPL-SCNC: 140 MMOL/L (ref 136–145)
WBC # BLD AUTO: 4.3 X10(3) UL (ref 4–11)

## 2022-03-01 PROCEDURE — 86901 BLOOD TYPING SEROLOGIC RH(D): CPT

## 2022-03-01 PROCEDURE — 86900 BLOOD TYPING SEROLOGIC ABO: CPT

## 2022-03-01 PROCEDURE — 86850 RBC ANTIBODY SCREEN: CPT

## 2022-03-01 PROCEDURE — 85025 COMPLETE CBC W/AUTO DIFF WBC: CPT

## 2022-03-01 PROCEDURE — 80048 BASIC METABOLIC PNL TOTAL CA: CPT

## 2022-03-01 PROCEDURE — 36415 COLL VENOUS BLD VENIPUNCTURE: CPT

## 2022-03-01 PROCEDURE — 71046 X-RAY EXAM CHEST 2 VIEWS: CPT | Performed by: INTERNAL MEDICINE

## 2022-03-02 LAB — SARS-COV-2 RNA RESP QL NAA+PROBE: NOT DETECTED

## 2022-03-04 ENCOUNTER — APPOINTMENT (OUTPATIENT)
Dept: CV DIAGNOSTICS | Facility: HOSPITAL | Age: 87
DRG: 274 | End: 2022-03-04
Attending: INTERNAL MEDICINE
Payer: MEDICARE

## 2022-03-04 ENCOUNTER — ANESTHESIA (OUTPATIENT)
Dept: INTERVENTIONAL RADIOLOGY/VASCULAR | Facility: HOSPITAL | Age: 87
DRG: 274 | End: 2022-03-04
Payer: MEDICARE

## 2022-03-04 ENCOUNTER — HOSPITAL ENCOUNTER (INPATIENT)
Dept: INTERVENTIONAL RADIOLOGY/VASCULAR | Facility: HOSPITAL | Age: 87
LOS: 1 days | Discharge: HOME | DRG: 274 | End: 2022-03-04
Attending: INTERNAL MEDICINE | Admitting: INTERNAL MEDICINE
Payer: MEDICARE

## 2022-03-04 VITALS
OXYGEN SATURATION: 100 % | TEMPERATURE: 97 F | RESPIRATION RATE: 14 BRPM | WEIGHT: 146 LBS | DIASTOLIC BLOOD PRESSURE: 69 MMHG | BODY MASS INDEX: 24.32 KG/M2 | HEIGHT: 65 IN | SYSTOLIC BLOOD PRESSURE: 133 MMHG | HEART RATE: 69 BPM

## 2022-03-04 DIAGNOSIS — I48.91 A-FIB (HCC): Primary | ICD-10-CM

## 2022-03-04 DIAGNOSIS — I48.91 ATRIAL FIBRILLATION, UNSPECIFIED TYPE (HCC): ICD-10-CM

## 2022-03-04 LAB
ATRIAL RATE: 61 BPM
ISTAT ACTIVATED CLOTTING TIME: 136 SECONDS (ref 74–137)
ISTAT ACTIVATED CLOTTING TIME: 220 SECONDS (ref 74–137)
ISTAT ACTIVATED CLOTTING TIME: 244 SECONDS (ref 74–137)
ISTAT ACTIVATED CLOTTING TIME: 249 SECONDS (ref 74–137)
Q-T INTERVAL: 480 MS
QRS DURATION: 174 MS
QTC CALCULATION (BEZET): 518 MS
R AXIS: -83 DEGREES
T AXIS: 71 DEGREES
VENTRICULAR RATE: 70 BPM

## 2022-03-04 PROCEDURE — B51VYZZ FLUOROSCOPY OF OTHER VEINS USING OTHER CONTRAST: ICD-10-PCS | Performed by: INTERNAL MEDICINE

## 2022-03-04 PROCEDURE — 93010 ELECTROCARDIOGRAM REPORT: CPT | Performed by: INTERNAL MEDICINE

## 2022-03-04 PROCEDURE — 33340 PERQ CLSR TCAT L ATR APNDGE: CPT

## 2022-03-04 PROCEDURE — 02L73DK OCCLUSION OF LEFT ATRIAL APPENDAGE WITH INTRALUMINAL DEVICE, PERCUTANEOUS APPROACH: ICD-10-PCS | Performed by: INTERNAL MEDICINE

## 2022-03-04 PROCEDURE — 93355 ECHO TRANSESOPHAGEAL (TEE): CPT | Performed by: INTERNAL MEDICINE

## 2022-03-04 PROCEDURE — 85347 COAGULATION TIME ACTIVATED: CPT

## 2022-03-04 PROCEDURE — 86920 COMPATIBILITY TEST SPIN: CPT

## 2022-03-04 PROCEDURE — B245ZZ4 ULTRASONOGRAPHY OF LEFT HEART, TRANSESOPHAGEAL: ICD-10-PCS | Performed by: INTERNAL MEDICINE

## 2022-03-04 PROCEDURE — 93005 ELECTROCARDIOGRAM TRACING: CPT

## 2022-03-04 RX ORDER — NEOSTIGMINE METHYLSULFATE 1 MG/ML
INJECTION INTRAVENOUS AS NEEDED
Status: DISCONTINUED | OUTPATIENT
Start: 2022-03-04 | End: 2022-03-04 | Stop reason: SURG

## 2022-03-04 RX ORDER — ASPIRIN 81 MG/1
81 TABLET ORAL DAILY
Qty: 90 TABLET | Refills: 1 | Status: SHIPPED | OUTPATIENT
Start: 2022-03-04

## 2022-03-04 RX ORDER — SODIUM CHLORIDE, SODIUM LACTATE, POTASSIUM CHLORIDE, CALCIUM CHLORIDE 600; 310; 30; 20 MG/100ML; MG/100ML; MG/100ML; MG/100ML
INJECTION, SOLUTION INTRAVENOUS CONTINUOUS
Status: DISCONTINUED | OUTPATIENT
Start: 2022-03-04 | End: 2022-03-04 | Stop reason: HOSPADM

## 2022-03-04 RX ORDER — ACETAMINOPHEN 325 MG/1
650 TABLET ORAL EVERY 4 HOURS PRN
Status: DISCONTINUED | OUTPATIENT
Start: 2022-03-04 | End: 2022-03-04

## 2022-03-04 RX ORDER — CEFAZOLIN SODIUM/WATER 2 G/20 ML
SYRINGE (ML) INTRAVENOUS
Status: COMPLETED
Start: 2022-03-04 | End: 2022-03-04

## 2022-03-04 RX ORDER — PROTAMINE SULFATE 10 MG/ML
INJECTION, SOLUTION INTRAVENOUS AS NEEDED
Status: DISCONTINUED | OUTPATIENT
Start: 2022-03-04 | End: 2022-03-04 | Stop reason: SURG

## 2022-03-04 RX ORDER — ROCURONIUM BROMIDE 10 MG/ML
INJECTION, SOLUTION INTRAVENOUS AS NEEDED
Status: DISCONTINUED | OUTPATIENT
Start: 2022-03-04 | End: 2022-03-04 | Stop reason: SURG

## 2022-03-04 RX ORDER — ACETAMINOPHEN AND CODEINE PHOSPHATE 300; 30 MG/1; MG/1
2 TABLET ORAL EVERY 4 HOURS PRN
Status: DISCONTINUED | OUTPATIENT
Start: 2022-03-04 | End: 2022-03-04

## 2022-03-04 RX ORDER — ONDANSETRON 2 MG/ML
INJECTION INTRAMUSCULAR; INTRAVENOUS AS NEEDED
Status: DISCONTINUED | OUTPATIENT
Start: 2022-03-04 | End: 2022-03-04 | Stop reason: SURG

## 2022-03-04 RX ORDER — DEXAMETHASONE SODIUM PHOSPHATE 4 MG/ML
VIAL (ML) INJECTION AS NEEDED
Status: DISCONTINUED | OUTPATIENT
Start: 2022-03-04 | End: 2022-03-04 | Stop reason: SURG

## 2022-03-04 RX ORDER — MIDAZOLAM HYDROCHLORIDE 1 MG/ML
1 INJECTION INTRAMUSCULAR; INTRAVENOUS EVERY 5 MIN PRN
Status: DISCONTINUED | OUTPATIENT
Start: 2022-03-04 | End: 2022-03-04 | Stop reason: HOSPADM

## 2022-03-04 RX ORDER — HEPARIN SODIUM 5000 [USP'U]/ML
INJECTION, SOLUTION INTRAVENOUS; SUBCUTANEOUS
Status: COMPLETED
Start: 2022-03-04 | End: 2022-03-04

## 2022-03-04 RX ORDER — LIDOCAINE HYDROCHLORIDE 10 MG/ML
INJECTION, SOLUTION EPIDURAL; INFILTRATION; INTRACAUDAL; PERINEURAL
Status: COMPLETED
Start: 2022-03-04 | End: 2022-03-04

## 2022-03-04 RX ORDER — SODIUM CHLORIDE 9 MG/ML
INJECTION, SOLUTION INTRAVENOUS CONTINUOUS
Status: DISCONTINUED | OUTPATIENT
Start: 2022-03-04 | End: 2022-03-04

## 2022-03-04 RX ORDER — DIPHENHYDRAMINE HYDROCHLORIDE 50 MG/ML
12.5 INJECTION INTRAMUSCULAR; INTRAVENOUS AS NEEDED
Status: DISCONTINUED | OUTPATIENT
Start: 2022-03-04 | End: 2022-03-04 | Stop reason: HOSPADM

## 2022-03-04 RX ORDER — CLOPIDOGREL BISULFATE 75 MG/1
75 TABLET ORAL DAILY
Qty: 90 TABLET | Refills: 1 | Status: SHIPPED | OUTPATIENT
Start: 2022-03-04

## 2022-03-04 RX ORDER — HYDROMORPHONE HYDROCHLORIDE 1 MG/ML
INJECTION, SOLUTION INTRAMUSCULAR; INTRAVENOUS; SUBCUTANEOUS
Status: COMPLETED
Start: 2022-03-04 | End: 2022-03-04

## 2022-03-04 RX ORDER — ACETAMINOPHEN AND CODEINE PHOSPHATE 300; 30 MG/1; MG/1
1 TABLET ORAL EVERY 4 HOURS PRN
Status: DISCONTINUED | OUTPATIENT
Start: 2022-03-04 | End: 2022-03-04

## 2022-03-04 RX ORDER — METOCLOPRAMIDE HYDROCHLORIDE 5 MG/ML
10 INJECTION INTRAMUSCULAR; INTRAVENOUS AS NEEDED
Status: DISCONTINUED | OUTPATIENT
Start: 2022-03-04 | End: 2022-03-04 | Stop reason: HOSPADM

## 2022-03-04 RX ORDER — HEPARIN SODIUM 5000 [USP'U]/ML
INJECTION, SOLUTION INTRAVENOUS; SUBCUTANEOUS AS NEEDED
Status: DISCONTINUED | OUTPATIENT
Start: 2022-03-04 | End: 2022-03-04 | Stop reason: SURG

## 2022-03-04 RX ORDER — TOBRAMYCIN 3 MG/ML
2 SOLUTION/ DROPS OPHTHALMIC EVERY 8 HOURS
Status: DISCONTINUED | OUTPATIENT
Start: 2022-03-04 | End: 2022-03-04

## 2022-03-04 RX ORDER — GLYCOPYRROLATE 0.2 MG/ML
INJECTION, SOLUTION INTRAMUSCULAR; INTRAVENOUS AS NEEDED
Status: DISCONTINUED | OUTPATIENT
Start: 2022-03-04 | End: 2022-03-04 | Stop reason: SURG

## 2022-03-04 RX ORDER — NALOXONE HYDROCHLORIDE 0.4 MG/ML
80 INJECTION, SOLUTION INTRAMUSCULAR; INTRAVENOUS; SUBCUTANEOUS AS NEEDED
Status: DISCONTINUED | OUTPATIENT
Start: 2022-03-04 | End: 2022-03-04 | Stop reason: HOSPADM

## 2022-03-04 RX ORDER — SODIUM CHLORIDE 9 MG/ML
INJECTION, SOLUTION INTRAVENOUS CONTINUOUS PRN
Status: DISCONTINUED | OUTPATIENT
Start: 2022-03-04 | End: 2022-03-04 | Stop reason: SURG

## 2022-03-04 RX ORDER — HYDROMORPHONE HYDROCHLORIDE 1 MG/ML
0.4 INJECTION, SOLUTION INTRAMUSCULAR; INTRAVENOUS; SUBCUTANEOUS EVERY 5 MIN PRN
Status: DISCONTINUED | OUTPATIENT
Start: 2022-03-04 | End: 2022-03-04 | Stop reason: HOSPADM

## 2022-03-04 RX ORDER — HYDROCODONE BITARTRATE AND ACETAMINOPHEN 5; 325 MG/1; MG/1
2 TABLET ORAL AS NEEDED
Status: DISCONTINUED | OUTPATIENT
Start: 2022-03-04 | End: 2022-03-04 | Stop reason: HOSPADM

## 2022-03-04 RX ORDER — ONDANSETRON 2 MG/ML
4 INJECTION INTRAMUSCULAR; INTRAVENOUS AS NEEDED
Status: DISCONTINUED | OUTPATIENT
Start: 2022-03-04 | End: 2022-03-04 | Stop reason: HOSPADM

## 2022-03-04 RX ORDER — HYDROCODONE BITARTRATE AND ACETAMINOPHEN 5; 325 MG/1; MG/1
1 TABLET ORAL AS NEEDED
Status: DISCONTINUED | OUTPATIENT
Start: 2022-03-04 | End: 2022-03-04 | Stop reason: HOSPADM

## 2022-03-04 RX ORDER — PROTAMINE SULFATE 10 MG/ML
INJECTION, SOLUTION INTRAVENOUS
Status: COMPLETED
Start: 2022-03-04 | End: 2022-03-04

## 2022-03-04 RX ORDER — LIDOCAINE HYDROCHLORIDE 10 MG/ML
INJECTION, SOLUTION EPIDURAL; INFILTRATION; INTRACAUDAL; PERINEURAL AS NEEDED
Status: DISCONTINUED | OUTPATIENT
Start: 2022-03-04 | End: 2022-03-04 | Stop reason: SURG

## 2022-03-04 RX ORDER — MEPERIDINE HYDROCHLORIDE 25 MG/ML
12.5 INJECTION INTRAMUSCULAR; INTRAVENOUS; SUBCUTANEOUS AS NEEDED
Status: DISCONTINUED | OUTPATIENT
Start: 2022-03-04 | End: 2022-03-04 | Stop reason: HOSPADM

## 2022-03-04 RX ADMIN — TOBRAMYCIN 2 DROP: 3 SOLUTION/ DROPS OPHTHALMIC at 13:45:00

## 2022-03-04 RX ADMIN — HEPARIN SODIUM 3000 UNITS: 5000 INJECTION, SOLUTION INTRAVENOUS; SUBCUTANEOUS at 08:48:00

## 2022-03-04 RX ADMIN — LIDOCAINE HYDROCHLORIDE 50 MG: 10 INJECTION, SOLUTION EPIDURAL; INFILTRATION; INTRACAUDAL; PERINEURAL at 07:06:00

## 2022-03-04 RX ADMIN — HEPARIN SODIUM 5000 UNITS: 5000 INJECTION, SOLUTION INTRAVENOUS; SUBCUTANEOUS at 08:36:00

## 2022-03-04 RX ADMIN — GLYCOPYRROLATE 0.4 MG: 0.2 INJECTION, SOLUTION INTRAMUSCULAR; INTRAVENOUS at 09:41:00

## 2022-03-04 RX ADMIN — SODIUM CHLORIDE: 9 INJECTION, SOLUTION INTRAVENOUS at 10:18:00

## 2022-03-04 RX ADMIN — PROTAMINE SULFATE 50 MG: 10 INJECTION, SOLUTION INTRAVENOUS at 09:36:00

## 2022-03-04 RX ADMIN — HEPARIN SODIUM 5000 UNITS: 5000 INJECTION, SOLUTION INTRAVENOUS; SUBCUTANEOUS at 08:16:00

## 2022-03-04 RX ADMIN — HYDROMORPHONE HYDROCHLORIDE 0.4 MG: 1 INJECTION, SOLUTION INTRAMUSCULAR; INTRAVENOUS; SUBCUTANEOUS at 10:48:00

## 2022-03-04 RX ADMIN — NEOSTIGMINE METHYLSULFATE 3 MG: 1 INJECTION INTRAVENOUS at 09:41:00

## 2022-03-04 RX ADMIN — HEPARIN SODIUM 2000 UNITS: 5000 INJECTION, SOLUTION INTRAVENOUS; SUBCUTANEOUS at 09:23:00

## 2022-03-04 RX ADMIN — ROCURONIUM BROMIDE 50 MG: 10 INJECTION, SOLUTION INTRAVENOUS at 07:06:00

## 2022-03-04 RX ADMIN — ONDANSETRON 4 MG: 2 INJECTION INTRAMUSCULAR; INTRAVENOUS at 09:41:00

## 2022-03-04 RX ADMIN — HEPARIN SODIUM 2000 UNITS: 5000 INJECTION, SOLUTION INTRAVENOUS; SUBCUTANEOUS at 09:04:00

## 2022-03-04 RX ADMIN — SODIUM CHLORIDE: 9 INJECTION, SOLUTION INTRAVENOUS at 06:57:00

## 2022-03-04 RX ADMIN — DEXAMETHASONE SODIUM PHOSPHATE 4 MG: 4 MG/ML VIAL (ML) INJECTION at 07:16:00

## 2022-03-04 NOTE — PROCEDURES
PROCEDURE PERFORMED:   1. Watchman FLX device implant. 2. Transseptal catheterization with left atrial pressure recording. INDICATIONS FOR PROCEDURE: History of frequent falls on [address]. CHADSVasc score 5 (Age-2, HTN, HFpEF, Vasc)    OPERATORS: MD Blanca Rizvi MD    DESCRIPTION OF PROCEDURE: The patient was brought to the endovascular suite in a fasting and nonsedated state after providing informed consent. Sedation was administered by the general anesthesia service. The right and left groins were prepped and draped in a sterile fashion. After administering 1% lidocaine for local anesthesia,  a 14-Greek short sheath was placed in the right femoral vein. Using a long guidewire, an SL1 sheath was advanced to the SVC/RA junction. The guidewire was removed, and the sheath and dilator were flushed. A [employer] RF needle was then inserted into the SL1 sheath. The sheath, dilator, and needle were then withdrawn until tenting was clearly visualized in the atrial septum. Transseptal access using a Aldair needle was achieved after the needle was advanced beyond the dilator tip. Prior to transseptal access, a heparin bolus was given. Following transseptal access, a second heparin bolus was given followed by intermittent. The needle and dilator were removed, and the SL1 sheath was aspirated and flushed. A long guidewire was then positioned in the left upper pulmonary vein. The SL1 sheath was exchanged for the watchman delivery sheath using a safari exchange wire. The short 14-Greek sheath was also removed. A Watchman access sheath was then advanced over the long guidewire into the ostium of the left upper pulmonary vein. See separate operative report for device deployment. CONCLUSIONS:   1. Successful deployment of a 22 mm Amulet device. Deployment was confirmed by demonstrating the appropriate PASS criteria of position, anchor, size, and seal (Color flow leak was 0 mm).  This is outlined in the procedure note. 2. No pericardial effusion by HELEN at the conclusion of the procedure. 3. The patient was transported to postanesthesia recovery in stable condition. 4. CHADSVasc score 5.       Dino Sidhu MD  [address] [place] [address]  Interventional Cardiology

## 2022-03-04 NOTE — ANESTHESIA PROCEDURE NOTES
Arterial Line  Performed by: Asad Cordova MD  Authorized by: Asad Cordova MD     General Information and Staff    Procedure Start:  3/4/2022 7:11 AM  Procedure End:  3/4/2022 7:14 AM  Anesthesiologist:  Asad Cordova MD  Performed By:  Anesthesiologist  Patient Location:  OR  Indication: continuous blood pressure monitoring and blood sampling needed    Site Identification: surface landmarks    Preanesthetic Checklist: 2 patient identifiers, IV checked, risks and benefits discussed, monitors and equipment checked, pre-op evaluation, timeout performed, anesthesia consent and sterile technique used    Procedure Details    Catheter Size:  20 G  Catheter Length:  1 and 3/4 inchCatheter Type:  Arrow  Seldinger Technique?: Yes    Laterality:  LeftSite:  Radial artery  Site Prep: chlorhexidine  Line Secured:  Wrist Brace, tape and Tegaderm    Assessment    Events: patient tolerated procedure well with no complications      Medications      Additional Comments

## 2022-03-04 NOTE — ANESTHESIA PROCEDURE NOTES
Peripheral IV  Date/Time: 3/4/2022 7:15 AM  Inserted by: Paul Figueroa MD    Placement  Needle size: 18 G  Laterality: left  Location: forearm  Local anesthetic: none  Site prep: alcohol  Technique: anatomical landmarks  Attempts: 2

## 2022-03-04 NOTE — PROGRESS NOTES
Pt received from PACU s/p watchman. Pt arrived flat, right groin dressing CDI, no bleeding or hematoma. +pedals. Pt family at bedside. Pt flat until 1245 then HOB elevated. Pt ate and drank. Pt oriented x4. Pt c/o right eye irritation, Dr. Carlo Perez aware and saw pt at bedside. Pt started on tobramycin eye drop to the right eye, first dose given at [address]. Pt daughter given the eye drops to continue as prescribed at home. Pt urinated in urinal. Discharge instructions reviewed with pt and pt daughter and wife, copy given. Pt and family verbalized understanding to stop xarelto and to  from pharmacy plavix and aspirin and start taking as prescribed. After recovery, pt up to bathroom with RN with assistance. After right groin assessed, dressing cdi, site soft. Pt assisted back to bed, 2 iv's pulled. Pt daughter assisted pt with getting dressing. Pt discharged home via wheelchair by volunteer. Pt left with belongings. Pt daughter drove pt home.

## 2022-03-04 NOTE — PROGRESS NOTES
Complaining of mild right eye. Pt s/p GA for watchman. Vision intact. Likely corneal abrasion. Reassured pt and daughter. Tobramycin eye drops ordered.

## 2022-03-04 NOTE — ANESTHESIA POSTPROCEDURE EVALUATION
[facility]    Mireille Lantigua Patient Status:  Inpatient   Age/Gender 80year old male MRN [id number]   Location [address] Attending Jacey Coello MD   [address] Day [address] 0 PCP Simeon Fortune MD       Anesthesia Post-op Note        Procedure Summary     Date: 03/04/22 Room / Location: [address] [place] [address]    Anesthesia Start: 6281 Anesthesia Stop:     Procedure: CATH WATCHMAN Diagnosis:       Atrial fibrillation, unspecified type [facility])      Atrial fibrillation, unspecified type [facility])    Scheduled Providers: Alfred Hill MD Anesthesiologist: Alfred Hill MD    Anesthesia Type: general ASA Status: 3          Anesthesia Type: No value filed. Vitals Value Taken Time   /54 03/04/22 1019   Temp 96.6 03/04/22 1019   Pulse 70 03/04/22 1018   Resp 13 03/04/22 1018   SpO2 98 % 03/04/22 1018   Vitals shown include unvalidated device data. Patient Location: PACU    Anesthesia Type: general    Airway Patency: patent    Postop Pain Control: adequate    Mental Status: mildly sedated but able to meaningfully participate in the post-anesthesia evaluation    Nausea/Vomiting: none    Cardiopulmonary/Hydration status: stable euvolemic    Complications: no apparent anesthesia related complications    Postop vital signs: stable    Dental Exam: Unchanged from Preop    Patient to be discharged from PACU when criteria met.

## 2022-03-04 NOTE — PROGRESS NOTES
[facility] CARDIOLOGY  HELEN Note    Mireille Rausch Location:      MRN [id number]   Admission Date 3/4/2022 Operation Date 3/4/2022   Attending Physician Ana Garvey MD Operating Physician Ching Pak MD     Pre-Operative Diagnosis: Chronic a fib. NOLAN occluder device placement. Post-Operative Diagnosis: Amulet NOLAN occluder. Procedure Performed: HELEN with 3D, intra-op with trans-septal cath and NOLAN occluder device placement. Summary of Case: Chicken wing NOLAN. 22 mm Watchman NOLAN occluder device placed via HELEN guided trans-septal cath. 22 mm device placed, stability test passed. Excellent final result. Full report to follow.     Ching Pak MD  3/4/2022  9:47 AM

## 2022-03-06 LAB — BLOOD TYPE BARCODE: 6200

## 2022-03-07 NOTE — PROCEDURES
[address]    PATIENT'S NAME: Kera Badillo   ATTENDING PHYSICIAN: Malik Perez M.D. OPERATING PHYSICIAN: Bayron Merida M.D. PATIENT ACCOUNT#:   [de-identified]    LOCATION:  PACU [address] [place], [place] [address]U 2 [facility] 10  MEDICAL RECORD #:   [id number]       YOB: 1934  ADMISSION DATE:       03/04/2022      OPERATION DATE:  03/04/2022    CARDIAC PROCEDURE TRANSCRIPTION    TRANSESOPHAGEAL ECHOCARDIOGRAM AND INTRAOPERATIVE STRUCTURAL PROCEDURE     PREOPERATIVE DIAGNOSIS:    1. Chronic atrial fibrillation. 2.   Contraindication to long-term systemic anticoagulation. POSTOPERATIVE DIAGNOSIS:  Amulet left atrial appendage occluder device placement. PROCEDURE PERFORMED:  Transesophageal echo probe placement, complete transesophageal echocardiography with 2- and 3-dimensional imaging, Doppler and color flow mapping. SEDATION:  The study was done under general endotracheal tube anesthesia administered by Dr. Liliana Izquierdo. INDICATIONS:  HELEN evaluation of left atrial appendage anatomy. HELEN imaging guidance for transseptal heart catheterization and LAAO device placement. PROCEDURAL COMMENTS:  Complete omniplane transesophageal echocardiography with 2- and 3-dimensional imaging, as well as Doppler and color flow mapping, was performed in the cardiac catheterization laboratory at [facility].  This study was done in conjunction with a HELEN-guided transeptal heart catheterization and LAAO device placement performed by Jhon Manzanares and Earl. The study was done under general endotracheal tube anesthesia administered by Dr. Liliana Izquierdo. He had been unable to successfully place a transesophageal probe. The HELEN was carefully inserted through the retropharynx and into the esophagus and stomach. Views were obtained from the standard imaging windows and were of excellent quality. FINDINGS:  Preprocedural imaging reveals marked biatrial enlargement with significant distortion of cardiac anatomy.   In addition, there is a dilated aortic root, measuring up to 4.8 cm in diameter. There is a trileaflet aortic valve with associated at least mild aortic regurgitation. Transvenous pacemaker leads enter the heart via the superior vena cava, with tips terminating in the right atrial appendage and right ventricular apex. There is moderate thickening of the primum portion of the atrial septum, with a relatively small thin secundum portion of the septum noted. There is a patent foramen ovale incidentally noted with trivial left-right shunting. Complete interrogation of the left atrial appendage was performed in the standard imaging views. The left atrial appendage had so-called chicken wing-type anatomy. There was spontaneous echo contrast (smoke) noted. The ostium of the appendage had diameters of 1.7 to 2.1 cm. Appendage depths were 2.2 to 2.7 cm. There was a single prominent pectinate ridge  the large anterior wing lobe and the rest of the body of the appendage. Based on HELEN imaging, the decision was made to proceed with attempted placement of a 22 mm Amulet left atrial appendage occluder device, with an inferior-posterior septal puncture. With HELEN imaging guidance, the transeptal catheterization system was positioned against the right atrial wall of the septum. Once appropriate positioning and tenting in the inferior-posterior septum was achieved, transseptal catheterization was performed under direct HELEN visualization guidance. The transseptal needle, dilator, and sheath were advanced sequentially through the septum and into the left atrium. Two and 3-dimensional imaging confirmed appropriate positioning with no pericardial effusion or other mechanical complication. The transseptal catheter was then exchanged over a Safari wire for an Amulet delivery catheter. A pigtail catheter was advanced through the delivery catheter and positioned in the left atrial appendage under direct HELEN imaging guidance.   The delivery catheter was then advanced into the appendage. The pigtail catheter was removed and the Amulet occluder device was then advanced into the appendage. The first attempted deployment resulted in an unsatisfactory position. A partial recapture was performed. Second delivery attempt resulted in excellent positioning of the device. The anchoring lobe was under adequate compression. The occluder disc was then deployed. Once appropriate positioning was achieved, a tension test was performed under direct HELEN visualization, confirming device stability. Complete interrogation was performed. Two and 3-dimensional imaging demonstrated excellent positioning of the occluder device. There was no peridevice leak. Following this, the device was released. Final 2- and 3-dimensional imaging showed excellent positioning and complete occlusion of the appendage. The delivery catheter was then withdrawn back into the right atrium. Postprocedural imaging showed trivial left to right shunting through the septal puncture site. There was no evidence for pericardial effusion or other early mechanical complication. CONCLUSIONS:  Successful HELEN-guided transeptal heart catheterization and 22 mm Amulet left atrial appendage occluder device placement, as described above.     Dictated By Monik Champagne M.D.  d: 03/05/2022 12:12:13  t: 03/06/2022 67:37:09  [facility] [id number]  CONTRERAS/

## 2022-03-09 ENCOUNTER — LAB ENCOUNTER (OUTPATIENT)
Dept: LAB | Facility: HOSPITAL | Age: 87
End: 2022-03-09
Attending: INTERNAL MEDICINE
Payer: MEDICARE

## 2022-03-09 DIAGNOSIS — E03.9 HYPOTHYROIDISM, UNSPECIFIED TYPE: ICD-10-CM

## 2022-03-09 DIAGNOSIS — I50.32 CHRONIC DIASTOLIC CHF (CONGESTIVE HEART FAILURE) (HCC): ICD-10-CM

## 2022-03-09 LAB
ANION GAP SERPL CALC-SCNC: 6 MMOL/L (ref 0–18)
BUN BLD-MCNC: 33 MG/DL (ref 7–18)
BUN/CREAT SERPL: 35.5 (ref 10–20)
CALCIUM BLD-MCNC: 8.8 MG/DL (ref 8.5–10.1)
CHLORIDE SERPL-SCNC: 111 MMOL/L (ref 98–112)
CO2 SERPL-SCNC: 27 MMOL/L (ref 21–32)
CREAT BLD-MCNC: 0.93 MG/DL
FASTING STATUS PATIENT QL REPORTED: NO
GLUCOSE BLD-MCNC: 88 MG/DL (ref 70–99)
OSMOLALITY SERPL CALC.SUM OF ELEC: 305 MOSM/KG (ref 275–295)
POTASSIUM SERPL-SCNC: 4.5 MMOL/L (ref 3.5–5.1)
SODIUM SERPL-SCNC: 144 MMOL/L (ref 136–145)
TSI SER-ACNC: 1.28 MIU/ML (ref 0.36–3.74)

## 2022-03-09 PROCEDURE — 80048 BASIC METABOLIC PNL TOTAL CA: CPT

## 2022-03-09 PROCEDURE — 84443 ASSAY THYROID STIM HORMONE: CPT

## 2022-03-09 PROCEDURE — 36415 COLL VENOUS BLD VENIPUNCTURE: CPT

## 2022-03-19 ENCOUNTER — HOSPITAL ENCOUNTER (OUTPATIENT)
Age: 87
Discharge: HOME | End: 2022-03-19
Attending: EMERGENCY MEDICINE
Payer: MEDICARE

## 2022-03-19 VITALS
SYSTOLIC BLOOD PRESSURE: 140 MMHG | OXYGEN SATURATION: 98 % | RESPIRATION RATE: 21 BRPM | DIASTOLIC BLOOD PRESSURE: 77 MMHG | HEART RATE: 70 BPM | TEMPERATURE: 97 F

## 2022-03-19 DIAGNOSIS — S61.411A SKIN TEAR OF RIGHT HAND WITHOUT COMPLICATION, INITIAL ENCOUNTER: Primary | ICD-10-CM

## 2022-03-19 DIAGNOSIS — S80.812A ABRASION, LEFT LOWER LEG, INITIAL ENCOUNTER: ICD-10-CM

## 2022-03-19 PROCEDURE — 99213 OFFICE O/P EST LOW 20 MIN: CPT

## 2022-03-19 NOTE — ED INITIAL ASSESSMENT (HPI)
Patient's wife and daughter report that patient cut his right hand on a  on Thursday which continues to bleed. Patient's daughter also states patient picked a scab on his left lower extremity and has been bleeding there as well. Patient is on Plavix and a baby aspirin.

## 2022-04-19 NOTE — H&P
I saw and examined the patient and agree with the attached assessment and plan. I discussed the risks and benefits at length with the patient and he was agreeable to proceed with post-watchman HELEN.     Nohemi Rodriguez MD  [address] [place] [address]

## 2022-04-22 ENCOUNTER — NURSE ONLY (OUTPATIENT)
Dept: LAB | Facility: HOSPITAL | Age: 87
End: 2022-04-22
Attending: STUDENT IN AN ORGANIZED HEALTH CARE EDUCATION/TRAINING PROGRAM
Payer: MEDICARE

## 2022-04-22 DIAGNOSIS — Z01.818 PRE-OP TESTING: ICD-10-CM

## 2022-04-22 LAB
DEPRECATED RDW RBC AUTO: 56.5 FL (ref 35.1–46.3)
ERYTHROCYTE [DISTWIDTH] IN BLOOD BY AUTOMATED COUNT: 15.9 % (ref 11–15)
HCT VFR BLD AUTO: 35.9 %
HGB BLD-MCNC: 11.1 G/DL
MCH RBC QN AUTO: 29.9 PG (ref 26–34)
MCHC RBC AUTO-ENTMCNC: 30.9 G/DL (ref 31–37)
MCV RBC AUTO: 96.8 FL
PLATELET # BLD AUTO: 112 10(3)UL (ref 150–450)
RBC # BLD AUTO: 3.71 X10(6)UL
WBC # BLD AUTO: 4.7 X10(3) UL (ref 4–11)

## 2022-04-22 PROCEDURE — 36415 COLL VENOUS BLD VENIPUNCTURE: CPT

## 2022-04-22 PROCEDURE — 85027 COMPLETE CBC AUTOMATED: CPT

## 2022-04-23 LAB — SARS-COV-2 RNA RESP QL NAA+PROBE: NOT DETECTED

## 2022-04-25 ENCOUNTER — HOSPITAL ENCOUNTER (OUTPATIENT)
Dept: INTERVENTIONAL RADIOLOGY/VASCULAR | Facility: HOSPITAL | Age: 87
Discharge: HOME | End: 2022-04-25
Attending: INTERNAL MEDICINE | Admitting: STUDENT IN AN ORGANIZED HEALTH CARE EDUCATION/TRAINING PROGRAM
Payer: MEDICARE

## 2022-04-25 ENCOUNTER — HOSPITAL ENCOUNTER (OUTPATIENT)
Dept: CV DIAGNOSTICS | Facility: HOSPITAL | Age: 87
Discharge: HOME | End: 2022-04-25
Attending: INTERNAL MEDICINE
Payer: MEDICARE

## 2022-04-25 VITALS
RESPIRATION RATE: 14 BRPM | DIASTOLIC BLOOD PRESSURE: 71 MMHG | OXYGEN SATURATION: 97 % | TEMPERATURE: 98 F | HEART RATE: 70 BPM | SYSTOLIC BLOOD PRESSURE: 140 MMHG

## 2022-04-25 DIAGNOSIS — I48.91 ATRIAL FIBRILLATION (HCC): ICD-10-CM

## 2022-04-25 DIAGNOSIS — Z01.818 PRE-OP TESTING: Primary | ICD-10-CM

## 2022-04-25 PROCEDURE — 99152 MOD SED SAME PHYS/QHP 5/>YRS: CPT

## 2022-04-25 PROCEDURE — 99153 MOD SED SAME PHYS/QHP EA: CPT

## 2022-04-25 PROCEDURE — 93312 ECHO TRANSESOPHAGEAL: CPT

## 2022-04-25 PROCEDURE — 36415 COLL VENOUS BLD VENIPUNCTURE: CPT

## 2022-04-25 RX ORDER — LIDOCAINE HYDROCHLORIDE 20 MG/ML
5 SOLUTION OROPHARYNGEAL
Status: DISCONTINUED | OUTPATIENT
Start: 2022-04-25 | End: 2022-04-25

## 2022-04-25 RX ORDER — MIDAZOLAM HYDROCHLORIDE 1 MG/ML
3 INJECTION INTRAMUSCULAR; INTRAVENOUS ONCE
Status: DISCONTINUED | OUTPATIENT
Start: 2022-04-25 | End: 2022-04-25

## 2022-04-25 RX ORDER — LIDOCAINE HYDROCHLORIDE 20 MG/ML
SOLUTION OROPHARYNGEAL
Status: COMPLETED
Start: 2022-04-25 | End: 2022-04-25

## 2022-04-25 RX ORDER — SODIUM CHLORIDE 9 MG/ML
INJECTION, SOLUTION INTRAVENOUS
Status: COMPLETED | OUTPATIENT
Start: 2022-04-25 | End: 2022-04-25

## 2022-04-25 RX ORDER — MIDAZOLAM HYDROCHLORIDE 1 MG/ML
INJECTION INTRAMUSCULAR; INTRAVENOUS
Status: COMPLETED
Start: 2022-04-25 | End: 2022-04-25

## 2022-04-25 RX ORDER — SODIUM CHLORIDE 0.9 % (FLUSH) 0.9 %
10 SYRINGE (ML) INJECTION AS NEEDED
Status: DISCONTINUED | OUTPATIENT
Start: 2022-04-25 | End: 2022-04-25

## 2022-04-25 RX ADMIN — MIDAZOLAM HYDROCHLORIDE 3 MG: 1 INJECTION INTRAMUSCULAR; INTRAVENOUS at 10:05:00

## 2022-04-25 RX ADMIN — LIDOCAINE HYDROCHLORIDE: 20 SOLUTION OROPHARYNGEAL at 09:50:00

## 2022-04-25 RX ADMIN — SODIUM CHLORIDE: 9 INJECTION, SOLUTION INTRAVENOUS at 08:30:00

## 2022-04-25 NOTE — IVS NOTE
DISCHARGE NOTE     Pt is able to sit up and ambulate with moderate assist, same from baseline . Patient have increased salivation from procedure with pinkish mucus. Patient denies sore throat or pain. Breathing nice and easy. No short of breath. Vital signs stable. IV access removed. Instruction provided, patient/family verbalizes understanding. Dr. Vickie Haile spoke with patient/family post procedure. Pt discharge via wheelchair to [address]   Follow up Appointment: as scheduled.

## 2022-04-25 NOTE — PROCEDURES
Multiple attempts were made to pass probe down the esophagus however I encountered significant resistance and coiling of the probe. Patient received a total of 3 mg versed and 75 mcg of fentanyl however patient was still resisting the procedure and developed significant wheezing and secretions although maintained saturations throughout. Given inability to pass the probe decision was made to reschedule in the future with anesthesia support to help maintain the airway and assist with deeper sedation. Post-procedure patient reported hoarse speech and small amount of blood tinged sputum. He denied any pain or respiratory complaints and was hemodynamically stable. He was instructed to call our office immediately if he develops post-procured pain or difficulty with swallowing. Plan to be discharged home with procedure rescheduled with anesthesia.     Rosalia Meckel, MD  [address] [place] [address]

## 2022-04-29 ENCOUNTER — LAB ENCOUNTER (OUTPATIENT)
Dept: LAB | Facility: HOSPITAL | Age: 87
End: 2022-04-29
Attending: STUDENT IN AN ORGANIZED HEALTH CARE EDUCATION/TRAINING PROGRAM
Payer: MEDICARE

## 2022-04-29 DIAGNOSIS — Z01.818 PRE-OP TESTING: ICD-10-CM

## 2022-04-29 NOTE — H&P
I saw and examined the above patient and agree with the attached assessment and plan. I discussed the risks and benefits of the procedure and patient was agreeable to undergo post-watchman HELEN.     Israel Ruby MD  [address] [place] [address]

## 2022-04-30 LAB — SARS-COV-2 RNA RESP QL NAA+PROBE: NOT DETECTED

## 2022-05-02 ENCOUNTER — HOSPITAL ENCOUNTER (OUTPATIENT)
Dept: CV DIAGNOSTICS | Facility: HOSPITAL | Age: 87
Discharge: HOME | End: 2022-05-02
Attending: INTERNAL MEDICINE
Payer: MEDICARE

## 2022-05-02 ENCOUNTER — ANESTHESIA (OUTPATIENT)
Dept: INTERVENTIONAL RADIOLOGY/VASCULAR | Facility: HOSPITAL | Age: 87
End: 2022-05-02
Payer: MEDICARE

## 2022-05-02 ENCOUNTER — HOSPITAL ENCOUNTER (OUTPATIENT)
Dept: INTERVENTIONAL RADIOLOGY/VASCULAR | Facility: HOSPITAL | Age: 87
Discharge: HOME | End: 2022-05-02
Attending: INTERNAL MEDICINE | Admitting: INTERNAL MEDICINE
Payer: MEDICARE

## 2022-05-02 VITALS
OXYGEN SATURATION: 99 % | TEMPERATURE: 98 F | BODY MASS INDEX: 26.58 KG/M2 | DIASTOLIC BLOOD PRESSURE: 82 MMHG | HEIGHT: 63 IN | SYSTOLIC BLOOD PRESSURE: 129 MMHG | RESPIRATION RATE: 23 BRPM | WEIGHT: 150 LBS | HEART RATE: 70 BPM

## 2022-05-02 DIAGNOSIS — I48.91 ATRIAL FIBRILLATION (HCC): ICD-10-CM

## 2022-05-02 DIAGNOSIS — Z95.818 PRESENCE OF AMULET LEFT ATRIAL APPENDAGE CLOSURE DEVICE: ICD-10-CM

## 2022-05-02 DIAGNOSIS — Z01.818 PRE-OP TESTING: Primary | ICD-10-CM

## 2022-05-02 PROCEDURE — 93320 DOPPLER ECHO COMPLETE: CPT | Performed by: INTERNAL MEDICINE

## 2022-05-02 PROCEDURE — 93325 DOPPLER ECHO COLOR FLOW MAPG: CPT | Performed by: INTERNAL MEDICINE

## 2022-05-02 PROCEDURE — 93312 ECHO TRANSESOPHAGEAL: CPT

## 2022-05-02 PROCEDURE — 36415 COLL VENOUS BLD VENIPUNCTURE: CPT

## 2022-05-02 PROCEDURE — B24BZZ4 ULTRASONOGRAPHY OF HEART WITH AORTA, TRANSESOPHAGEAL: ICD-10-PCS | Performed by: STUDENT IN AN ORGANIZED HEALTH CARE EDUCATION/TRAINING PROGRAM

## 2022-05-02 RX ORDER — SODIUM CHLORIDE, SODIUM LACTATE, POTASSIUM CHLORIDE, CALCIUM CHLORIDE 600; 310; 30; 20 MG/100ML; MG/100ML; MG/100ML; MG/100ML
INJECTION, SOLUTION INTRAVENOUS CONTINUOUS PRN
Status: DISCONTINUED | OUTPATIENT
Start: 2022-05-02 | End: 2022-05-02 | Stop reason: SURG

## 2022-05-02 RX ORDER — SODIUM CHLORIDE 9 MG/ML
INJECTION, SOLUTION INTRAVENOUS
Status: COMPLETED | OUTPATIENT
Start: 2022-05-02 | End: 2022-05-02

## 2022-05-02 RX ORDER — LIDOCAINE HYDROCHLORIDE 10 MG/ML
INJECTION, SOLUTION EPIDURAL; INFILTRATION; INTRACAUDAL; PERINEURAL AS NEEDED
Status: DISCONTINUED | OUTPATIENT
Start: 2022-05-02 | End: 2022-05-02 | Stop reason: SURG

## 2022-05-02 RX ADMIN — SODIUM CHLORIDE: 9 INJECTION, SOLUTION INTRAVENOUS at 10:30:00

## 2022-05-02 RX ADMIN — SODIUM CHLORIDE, SODIUM LACTATE, POTASSIUM CHLORIDE, CALCIUM CHLORIDE: 600; 310; 30; 20 INJECTION, SOLUTION INTRAVENOUS at 11:29:00

## 2022-05-02 RX ADMIN — LIDOCAINE HYDROCHLORIDE 50 MG: 10 INJECTION, SOLUTION EPIDURAL; INFILTRATION; INTRACAUDAL; PERINEURAL at 11:37:00

## 2022-05-02 RX ADMIN — SODIUM CHLORIDE, SODIUM LACTATE, POTASSIUM CHLORIDE, CALCIUM CHLORIDE: 600; 310; 30; 20 INJECTION, SOLUTION INTRAVENOUS at 12:10:00

## 2022-05-02 NOTE — IVS NOTE
DISCHARGE NOTE     Pt is able to sit up and ambulate without difficulty. Pt voided and tolerated fluids and food. Procedural site remains dry and intact with good circulation, motion, and sensation. No signs and symptoms of bleeding/hematoma noted. IV access removed. Instruction provided, patient/family verbalizes understanding. Dr. Angie Arriaga spoke with patient/family post procedure. Pt discharge via wheelchair to [address] [place] [address] B   Follow up Appointment: as scheduled  New Prescription: none.

## 2022-07-06 ENCOUNTER — HOSPITAL ENCOUNTER (OUTPATIENT)
Dept: BONE DENSITY | Facility: HOSPITAL | Age: 87
Discharge: HOME | End: 2022-07-06
Attending: INTERNAL MEDICINE
Payer: MEDICARE

## 2022-07-06 DIAGNOSIS — M81.0 OSTEOPOROSIS, UNSPECIFIED OSTEOPOROSIS TYPE, UNSPECIFIED PATHOLOGICAL FRACTURE PRESENCE: ICD-10-CM

## 2022-07-06 PROCEDURE — 77080 DXA BONE DENSITY AXIAL: CPT | Performed by: INTERNAL MEDICINE

## 2022-07-14 ENCOUNTER — OFFICE VISIT (OUTPATIENT)
Dept: OTOLARYNGOLOGY | Facility: CLINIC | Age: 87
End: 2022-07-14
Payer: MEDICARE

## 2022-07-14 VITALS — BODY MASS INDEX: 26.58 KG/M2 | TEMPERATURE: 97 F | HEIGHT: 63 IN | WEIGHT: 150 LBS

## 2022-07-14 DIAGNOSIS — H93.90 LESION OF EAR: Primary | ICD-10-CM

## 2022-07-14 DIAGNOSIS — H61.23 BILATERAL IMPACTED CERUMEN: ICD-10-CM

## 2022-07-14 PROCEDURE — 11311 SHAVE SKIN LESION 0.6-1.0 CM: CPT | Performed by: OTOLARYNGOLOGY

## 2022-07-14 PROCEDURE — 99213 OFFICE O/P EST LOW 20 MIN: CPT | Performed by: OTOLARYNGOLOGY

## 2022-07-15 ENCOUNTER — HOSPITAL ENCOUNTER (EMERGENCY)
Facility: HOSPITAL | Age: 87
Discharge: HOME | End: 2022-07-15
Attending: EMERGENCY MEDICINE
Payer: MEDICARE

## 2022-07-15 VITALS
DIASTOLIC BLOOD PRESSURE: 71 MMHG | SYSTOLIC BLOOD PRESSURE: 123 MMHG | BODY MASS INDEX: 25.83 KG/M2 | RESPIRATION RATE: 16 BRPM | TEMPERATURE: 98 F | HEART RATE: 70 BPM | OXYGEN SATURATION: 97 % | WEIGHT: 155 LBS | HEIGHT: 65 IN

## 2022-07-15 DIAGNOSIS — K06.8 GINGIVAL BLEEDING: Primary | ICD-10-CM

## 2022-07-15 PROCEDURE — 99282 EMERGENCY DEPT VISIT SF MDM: CPT

## 2022-07-18 ENCOUNTER — TELEPHONE (OUTPATIENT)
Dept: OTOLARYNGOLOGY | Facility: CLINIC | Age: 87
End: 2022-07-18

## 2022-07-18 NOTE — TELEPHONE ENCOUNTER
Dr. Kandy West, [id number] spoke with patient's wife (on ARISTIDES) about biopsy results. He has appt with dermatologist Dr. Peter Hinojosa and they will see if he can do surgery for it. He has had surgery with him regarding skin cancer in the past. Asked her to notify us with update after appt.

## 2022-07-18 NOTE — TELEPHONE ENCOUNTER
----- Message from Charla Kebede MD sent at 7/17/2022  6:51 PM CDT -----  Please let him know that his biopsy reveals a squamous cell carcinoma. I would recommend further excision and reconstruction of this skin cancer. He can come in to discuss further.

## 2022-07-26 ENCOUNTER — LAB ENCOUNTER (OUTPATIENT)
Dept: LAB | Facility: HOSPITAL | Age: 87
End: 2022-07-26
Attending: INTERNAL MEDICINE
Payer: MEDICARE

## 2022-07-26 DIAGNOSIS — D47.2 MGUS (MONOCLONAL GAMMOPATHY OF UNKNOWN SIGNIFICANCE): ICD-10-CM

## 2022-07-26 LAB
ALBUMIN SERPL-MCNC: 3 G/DL (ref 3.4–5)
ALBUMIN/GLOB SERPL: 1 {RATIO} (ref 1–2)
ALP LIVER SERPL-CCNC: 56 U/L
ALT SERPL-CCNC: 17 U/L
ANION GAP SERPL CALC-SCNC: 6 MMOL/L (ref 0–18)
AST SERPL-CCNC: 23 U/L (ref 15–37)
BASOPHILS # BLD AUTO: 0.02 X10(3) UL (ref 0–0.2)
BASOPHILS NFR BLD AUTO: 0.4 %
BILIRUB SERPL-MCNC: 0.6 MG/DL (ref 0.1–2)
BUN BLD-MCNC: 31 MG/DL (ref 7–18)
BUN/CREAT SERPL: 29 (ref 10–20)
CALCIUM BLD-MCNC: 8.3 MG/DL (ref 8.5–10.1)
CHLORIDE SERPL-SCNC: 115 MMOL/L (ref 98–112)
CO2 SERPL-SCNC: 23 MMOL/L (ref 21–32)
CREAT BLD-MCNC: 1.07 MG/DL
DEPRECATED RDW RBC AUTO: 55 FL (ref 35.1–46.3)
EOSINOPHIL # BLD AUTO: 0.11 X10(3) UL (ref 0–0.7)
EOSINOPHIL NFR BLD AUTO: 2.3 %
ERYTHROCYTE [DISTWIDTH] IN BLOOD BY AUTOMATED COUNT: 15.4 % (ref 11–15)
FASTING STATUS PATIENT QL REPORTED: NO
GLOBULIN PLAS-MCNC: 3.1 G/DL (ref 2.8–4.4)
GLUCOSE BLD-MCNC: 103 MG/DL (ref 70–99)
HCT VFR BLD AUTO: 30 %
HGB BLD-MCNC: 9.2 G/DL
IGA SERPL-MCNC: 375 MG/DL (ref 70–312)
IGM SERPL-MCNC: 47.8 MG/DL (ref 43–279)
IMM GRANULOCYTES # BLD AUTO: 0.02 X10(3) UL (ref 0–1)
IMM GRANULOCYTES NFR BLD: 0.4 %
IMMUNOGLOBULIN PNL SER-MCNC: 843 MG/DL (ref 791–1643)
LYMPHOCYTES # BLD AUTO: 0.65 X10(3) UL (ref 1–4)
LYMPHOCYTES NFR BLD AUTO: 13.6 %
MCH RBC QN AUTO: 30 PG (ref 26–34)
MCHC RBC AUTO-ENTMCNC: 30.7 G/DL (ref 31–37)
MCV RBC AUTO: 97.7 FL
MONOCYTES # BLD AUTO: 0.56 X10(3) UL (ref 0.1–1)
MONOCYTES NFR BLD AUTO: 11.7 %
NEUTROPHILS # BLD AUTO: 3.43 X10 (3) UL (ref 1.5–7.7)
NEUTROPHILS # BLD AUTO: 3.43 X10(3) UL (ref 1.5–7.7)
NEUTROPHILS NFR BLD AUTO: 71.6 %
OSMOLALITY SERPL CALC.SUM OF ELEC: 305 MOSM/KG (ref 275–295)
PLATELET # BLD AUTO: 185 10(3)UL (ref 150–450)
POTASSIUM SERPL-SCNC: 5 MMOL/L (ref 3.5–5.1)
PROT SERPL-MCNC: 6.1 G/DL (ref 6.4–8.2)
RBC # BLD AUTO: 3.07 X10(6)UL
SODIUM SERPL-SCNC: 144 MMOL/L (ref 136–145)
WBC # BLD AUTO: 4.8 X10(3) UL (ref 4–11)

## 2022-07-26 PROCEDURE — 83521 IG LIGHT CHAINS FREE EACH: CPT

## 2022-07-26 PROCEDURE — 80053 COMPREHEN METABOLIC PANEL: CPT

## 2022-07-26 PROCEDURE — 85025 COMPLETE CBC W/AUTO DIFF WBC: CPT

## 2022-07-26 PROCEDURE — 84165 PROTEIN E-PHORESIS SERUM: CPT

## 2022-07-26 PROCEDURE — 83520 IMMUNOASSAY QUANT NOS NONAB: CPT

## 2022-07-26 PROCEDURE — 86334 IMMUNOFIX E-PHORESIS SERUM: CPT

## 2022-07-26 PROCEDURE — 36415 COLL VENOUS BLD VENIPUNCTURE: CPT

## 2022-07-26 PROCEDURE — 82784 ASSAY IGA/IGD/IGG/IGM EACH: CPT

## 2022-07-28 LAB
ALBUMIN SERPL ELPH-MCNC: 3.21 G/DL (ref 3.75–5.21)
ALBUMIN/GLOB SERPL: 1.24 {RATIO} (ref 1–2)
ALPHA1 GLOB SERPL ELPH-MCNC: 0.31 G/DL (ref 0.19–0.46)
ALPHA2 GLOB SERPL ELPH-MCNC: 0.66 G/DL (ref 0.48–1.05)
B-GLOBULIN SERPL ELPH-MCNC: 0.73 G/DL (ref 0.68–1.23)
GAMMA GLOB SERPL ELPH-MCNC: 0.89 G/DL (ref 0.62–1.7)
KAPPA LC FREE SER-MCNC: 4.31 MG/DL (ref 0.33–1.94)
KAPPA LC FREE/LAMBDA FREE SER NEPH: 2.11 {RATIO} (ref 0.26–1.65)
LAMBDA LC FREE SERPL-MCNC: 2.04 MG/DL (ref 0.57–2.63)
M PROTEIN 1 SERPL ELPH-MCNC: 0.1 G/DL (ref ?–0)
PROT SERPL-MCNC: 5.8 G/DL (ref 6.4–8.2)

## 2022-08-28 ENCOUNTER — HOSPITAL ENCOUNTER (EMERGENCY)
Facility: HOSPITAL | Age: 87
Discharge: HOME | End: 2022-08-28
Attending: EMERGENCY MEDICINE
Payer: MEDICARE

## 2022-08-28 VITALS
TEMPERATURE: 98 F | BODY MASS INDEX: 24.91 KG/M2 | WEIGHT: 155 LBS | OXYGEN SATURATION: 100 % | HEIGHT: 66 IN | HEART RATE: 70 BPM | DIASTOLIC BLOOD PRESSURE: 73 MMHG | SYSTOLIC BLOOD PRESSURE: 123 MMHG | RESPIRATION RATE: 16 BRPM

## 2022-08-28 DIAGNOSIS — S61.412A SKIN TEAR OF LEFT HAND WITHOUT COMPLICATION, INITIAL ENCOUNTER: Primary | ICD-10-CM

## 2022-08-28 PROCEDURE — 93010 ELECTROCARDIOGRAM REPORT: CPT | Performed by: EMERGENCY MEDICINE

## 2022-08-28 PROCEDURE — 99283 EMERGENCY DEPT VISIT LOW MDM: CPT

## 2022-08-28 PROCEDURE — 93005 ELECTROCARDIOGRAM TRACING: CPT

## 2022-08-28 NOTE — ED INITIAL ASSESSMENT (HPI)
Pt had a fall Saturday 0430 am, pt does not know if he tripped and fell or got dizzy. Pt has a lac to his L hand and L upper back. Bleeding is controlled to back, continuous bandages need to be applied to L hand to control bleeding. Pt denies LOC. Pt takes 81 mg ASA daily, but has not been taking it the last two days.

## 2022-09-12 ENCOUNTER — APPOINTMENT (OUTPATIENT)
Dept: GENERAL RADIOLOGY | Facility: HOSPITAL | Age: 87
End: 2022-09-12
Attending: EMERGENCY MEDICINE
Payer: MEDICARE

## 2022-09-12 ENCOUNTER — HOSPITAL ENCOUNTER (INPATIENT)
Facility: HOSPITAL | Age: 87
LOS: 4 days | Discharge: NURSING, REHAB OR LONG-TERM CARE | End: 2022-09-17
Attending: EMERGENCY MEDICINE | Admitting: HOSPITALIST
Payer: MEDICARE

## 2022-09-12 DIAGNOSIS — R06.09 DYSPNEA ON EXERTION: ICD-10-CM

## 2022-09-12 DIAGNOSIS — I50.9 ACUTE ON CHRONIC CONGESTIVE HEART FAILURE, UNSPECIFIED HEART FAILURE TYPE (HCC): Primary | ICD-10-CM

## 2022-09-12 LAB
ALBUMIN SERPL-MCNC: 3 G/DL (ref 3.4–5)
ALP LIVER SERPL-CCNC: 66 U/L
ALT SERPL-CCNC: 14 U/L
ANION GAP SERPL CALC-SCNC: 6 MMOL/L (ref 0–18)
AST SERPL-CCNC: 21 U/L (ref 15–37)
BASOPHILS # BLD AUTO: 0.02 X10(3) UL (ref 0–0.2)
BASOPHILS NFR BLD AUTO: 0.4 %
BILIRUB DIRECT SERPL-MCNC: 0.3 MG/DL (ref 0–0.2)
BILIRUB SERPL-MCNC: 0.7 MG/DL (ref 0.1–2)
BUN BLD-MCNC: 23 MG/DL (ref 7–18)
BUN/CREAT SERPL: 23.5 (ref 10–20)
CALCIUM BLD-MCNC: 8.4 MG/DL (ref 8.5–10.1)
CHLORIDE SERPL-SCNC: 111 MMOL/L (ref 98–112)
CO2 SERPL-SCNC: 24 MMOL/L (ref 21–32)
CREAT BLD-MCNC: 0.98 MG/DL
DEPRECATED RDW RBC AUTO: 55.7 FL (ref 35.1–46.3)
EOSINOPHIL # BLD AUTO: 0.13 X10(3) UL (ref 0–0.7)
EOSINOPHIL NFR BLD AUTO: 2.8 %
ERYTHROCYTE [DISTWIDTH] IN BLOOD BY AUTOMATED COUNT: 17.1 % (ref 11–15)
GFR SERPLBLD BASED ON 1.73 SQ M-ARVRAT: 74 ML/MIN/1.73M2 (ref 60–?)
GLUCOSE BLD-MCNC: 94 MG/DL (ref 70–99)
HCT VFR BLD AUTO: 33.3 %
HGB BLD-MCNC: 10.3 G/DL
IMM GRANULOCYTES # BLD AUTO: 0.01 X10(3) UL (ref 0–1)
IMM GRANULOCYTES NFR BLD: 0.2 %
LYMPHOCYTES # BLD AUTO: 0.77 X10(3) UL (ref 1–4)
LYMPHOCYTES NFR BLD AUTO: 16.7 %
MCH RBC QN AUTO: 28.2 PG (ref 26–34)
MCHC RBC AUTO-ENTMCNC: 30.9 G/DL (ref 31–37)
MCV RBC AUTO: 91.2 FL
MONOCYTES # BLD AUTO: 0.7 X10(3) UL (ref 0.1–1)
MONOCYTES NFR BLD AUTO: 15.2 %
NEUTROPHILS # BLD AUTO: 2.99 X10 (3) UL (ref 1.5–7.7)
NEUTROPHILS # BLD AUTO: 2.99 X10(3) UL (ref 1.5–7.7)
NEUTROPHILS NFR BLD AUTO: 64.7 %
NT-PROBNP SERPL-MCNC: 5573 PG/ML (ref ?–450)
OSMOLALITY SERPL CALC.SUM OF ELEC: 295 MOSM/KG (ref 275–295)
PLATELET # BLD AUTO: 179 10(3)UL (ref 150–450)
POTASSIUM SERPL-SCNC: 3.9 MMOL/L (ref 3.5–5.1)
PROT SERPL-MCNC: 6.4 G/DL (ref 6.4–8.2)
RBC # BLD AUTO: 3.65 X10(6)UL
SARS-COV-2 RNA RESP QL NAA+PROBE: NOT DETECTED
SODIUM SERPL-SCNC: 141 MMOL/L (ref 136–145)
TROPONIN I HIGH SENSITIVITY: 35 NG/L
WBC # BLD AUTO: 4.6 X10(3) UL (ref 4–11)

## 2022-09-12 PROCEDURE — 71045 X-RAY EXAM CHEST 1 VIEW: CPT | Performed by: EMERGENCY MEDICINE

## 2022-09-12 PROCEDURE — 80048 BASIC METABOLIC PNL TOTAL CA: CPT | Performed by: EMERGENCY MEDICINE

## 2022-09-12 PROCEDURE — 80076 HEPATIC FUNCTION PANEL: CPT | Performed by: EMERGENCY MEDICINE

## 2022-09-12 PROCEDURE — 93010 ELECTROCARDIOGRAM REPORT: CPT | Performed by: EMERGENCY MEDICINE

## 2022-09-12 PROCEDURE — 83880 ASSAY OF NATRIURETIC PEPTIDE: CPT | Performed by: EMERGENCY MEDICINE

## 2022-09-12 PROCEDURE — 99285 EMERGENCY DEPT VISIT HI MDM: CPT

## 2022-09-12 PROCEDURE — 85025 COMPLETE CBC W/AUTO DIFF WBC: CPT | Performed by: EMERGENCY MEDICINE

## 2022-09-12 PROCEDURE — 96374 THER/PROPH/DIAG INJ IV PUSH: CPT

## 2022-09-12 PROCEDURE — 84484 ASSAY OF TROPONIN QUANT: CPT | Performed by: EMERGENCY MEDICINE

## 2022-09-12 PROCEDURE — 93005 ELECTROCARDIOGRAM TRACING: CPT

## 2022-09-12 PROCEDURE — 94644 CONT INHLJ TX 1ST HOUR: CPT

## 2022-09-12 RX ORDER — HEPARIN SODIUM 5000 [USP'U]/ML
5000 INJECTION, SOLUTION INTRAVENOUS; SUBCUTANEOUS EVERY 8 HOURS SCHEDULED
Status: DISCONTINUED | OUTPATIENT
Start: 2022-09-12 | End: 2022-09-13

## 2022-09-12 RX ORDER — FUROSEMIDE 10 MG/ML
40 INJECTION INTRAMUSCULAR; INTRAVENOUS ONCE
Status: COMPLETED | OUTPATIENT
Start: 2022-09-12 | End: 2022-09-12

## 2022-09-12 RX ORDER — ONDANSETRON 2 MG/ML
4 INJECTION INTRAMUSCULAR; INTRAVENOUS EVERY 6 HOURS PRN
Status: DISCONTINUED | OUTPATIENT
Start: 2022-09-12 | End: 2022-09-17

## 2022-09-12 RX ORDER — FUROSEMIDE 10 MG/ML
40 INJECTION INTRAMUSCULAR; INTRAVENOUS
Status: DISCONTINUED | OUTPATIENT
Start: 2022-09-13 | End: 2022-09-14

## 2022-09-12 RX ORDER — MELATONIN
325 2 TIMES DAILY WITH MEALS
COMMUNITY

## 2022-09-12 RX ORDER — MELATONIN
3 NIGHTLY PRN
Status: DISCONTINUED | OUTPATIENT
Start: 2022-09-12 | End: 2022-09-14

## 2022-09-12 RX ORDER — ASPIRIN 81 MG/1
81 TABLET ORAL DAILY
Status: DISCONTINUED | OUTPATIENT
Start: 2022-09-13 | End: 2022-09-17

## 2022-09-12 RX ORDER — LEVOTHYROXINE SODIUM 112 UG/1
112 TABLET ORAL DAILY
Status: DISCONTINUED | OUTPATIENT
Start: 2022-09-13 | End: 2022-09-17

## 2022-09-12 RX ORDER — CLOPIDOGREL BISULFATE 75 MG/1
75 TABLET ORAL DAILY
Status: DISCONTINUED | OUTPATIENT
Start: 2022-09-13 | End: 2022-09-13

## 2022-09-12 RX ORDER — ALBUTEROL SULFATE 2.5 MG/3ML
5 SOLUTION RESPIRATORY (INHALATION) ONCE
Status: COMPLETED | OUTPATIENT
Start: 2022-09-12 | End: 2022-09-12

## 2022-09-12 RX ORDER — ACETAMINOPHEN 500 MG
500 TABLET ORAL EVERY 4 HOURS PRN
Status: DISCONTINUED | OUTPATIENT
Start: 2022-09-12 | End: 2022-09-17

## 2022-09-12 RX ORDER — ATORVASTATIN CALCIUM 10 MG/1
10 TABLET, FILM COATED ORAL NIGHTLY
Status: DISCONTINUED | OUTPATIENT
Start: 2022-09-12 | End: 2022-09-17

## 2022-09-12 RX ORDER — LISINOPRIL 10 MG/1
10 TABLET ORAL DAILY
Status: DISCONTINUED | OUTPATIENT
Start: 2022-09-13 | End: 2022-09-17

## 2022-09-12 NOTE — ED QUICK NOTES
Orders for admission, patient is aware of plan and ready to go upstairs. LOC: AOx2, independent at home, ambulates with a walker until past 2 days. Denies pain.  Recent fall - high fall risk    MEDICATIONS INFUSING: n/a    FLUIDS:n/a    ACCESS:20g PIV    Rapid COVID is pending    Any questions, please call ED MEGHNA Tello  at extension [phone]

## 2022-09-13 ENCOUNTER — APPOINTMENT (OUTPATIENT)
Dept: CV DIAGNOSTICS | Facility: HOSPITAL | Age: 87
End: 2022-09-13
Attending: INTERNAL MEDICINE
Payer: MEDICARE

## 2022-09-13 LAB
ANION GAP SERPL CALC-SCNC: 9 MMOL/L (ref 0–18)
BASOPHILS # BLD AUTO: 0.02 X10(3) UL (ref 0–0.2)
BASOPHILS NFR BLD AUTO: 0.5 %
BILIRUB UR QL: NEGATIVE
BUN BLD-MCNC: 23 MG/DL (ref 7–18)
BUN/CREAT SERPL: 24.5 (ref 10–20)
CALCIUM BLD-MCNC: 8 MG/DL (ref 8.5–10.1)
CHLORIDE SERPL-SCNC: 112 MMOL/L (ref 98–112)
CLARITY UR: CLEAR
CO2 SERPL-SCNC: 23 MMOL/L (ref 21–32)
COLOR UR: YELLOW
CREAT BLD-MCNC: 0.94 MG/DL
DEPRECATED RDW RBC AUTO: 55.9 FL (ref 35.1–46.3)
EOSINOPHIL # BLD AUTO: 0.13 X10(3) UL (ref 0–0.7)
EOSINOPHIL NFR BLD AUTO: 3 %
ERYTHROCYTE [DISTWIDTH] IN BLOOD BY AUTOMATED COUNT: 17.3 % (ref 11–15)
GFR SERPLBLD BASED ON 1.73 SQ M-ARVRAT: 78 ML/MIN/1.73M2 (ref 60–?)
GLUCOSE BLD-MCNC: 94 MG/DL (ref 70–99)
GLUCOSE UR-MCNC: NEGATIVE MG/DL
HCT VFR BLD AUTO: 30.9 %
HGB BLD-MCNC: 9.8 G/DL
IMM GRANULOCYTES # BLD AUTO: 0 X10(3) UL (ref 0–1)
IMM GRANULOCYTES NFR BLD: 0 %
KETONES UR-MCNC: NEGATIVE MG/DL
LEUKOCYTE ESTERASE UR QL STRIP.AUTO: NEGATIVE
LYMPHOCYTES # BLD AUTO: 0.65 X10(3) UL (ref 1–4)
LYMPHOCYTES NFR BLD AUTO: 15.2 %
MAGNESIUM SERPL-MCNC: 1.9 MG/DL (ref 1.6–2.6)
MCH RBC QN AUTO: 28.3 PG (ref 26–34)
MCHC RBC AUTO-ENTMCNC: 31.7 G/DL (ref 31–37)
MCV RBC AUTO: 89.3 FL
MONOCYTES # BLD AUTO: 0.62 X10(3) UL (ref 0.1–1)
MONOCYTES NFR BLD AUTO: 14.5 %
NEUTROPHILS # BLD AUTO: 2.85 X10 (3) UL (ref 1.5–7.7)
NEUTROPHILS # BLD AUTO: 2.85 X10(3) UL (ref 1.5–7.7)
NEUTROPHILS NFR BLD AUTO: 66.8 %
NITRITE UR QL STRIP.AUTO: NEGATIVE
OSMOLALITY SERPL CALC.SUM OF ELEC: 301 MOSM/KG (ref 275–295)
PH UR: 5.5 [PH] (ref 5–8)
PLATELET # BLD AUTO: 169 10(3)UL (ref 150–450)
POTASSIUM SERPL-SCNC: 3.8 MMOL/L (ref 3.5–5.1)
PROT UR-MCNC: NEGATIVE MG/DL
RBC # BLD AUTO: 3.46 X10(6)UL
SODIUM SERPL-SCNC: 144 MMOL/L (ref 136–145)
SP GR UR STRIP: 1.01 (ref 1–1.03)
UROBILINOGEN UR STRIP-ACNC: 0.2
WBC # BLD AUTO: 4.3 X10(3) UL (ref 4–11)

## 2022-09-13 PROCEDURE — 81015 MICROSCOPIC EXAM OF URINE: CPT | Performed by: HOSPITALIST

## 2022-09-13 PROCEDURE — 93306 TTE W/DOPPLER COMPLETE: CPT | Performed by: INTERNAL MEDICINE

## 2022-09-13 PROCEDURE — 97162 PT EVAL MOD COMPLEX 30 MIN: CPT

## 2022-09-13 PROCEDURE — 83735 ASSAY OF MAGNESIUM: CPT | Performed by: INTERNAL MEDICINE

## 2022-09-13 PROCEDURE — 97166 OT EVAL MOD COMPLEX 45 MIN: CPT

## 2022-09-13 PROCEDURE — 97530 THERAPEUTIC ACTIVITIES: CPT

## 2022-09-13 PROCEDURE — 81001 URINALYSIS AUTO W/SCOPE: CPT | Performed by: HOSPITALIST

## 2022-09-13 PROCEDURE — 97535 SELF CARE MNGMENT TRAINING: CPT

## 2022-09-13 PROCEDURE — 85025 COMPLETE CBC W/AUTO DIFF WBC: CPT | Performed by: INTERNAL MEDICINE

## 2022-09-13 PROCEDURE — 80048 BASIC METABOLIC PNL TOTAL CA: CPT | Performed by: INTERNAL MEDICINE

## 2022-09-13 RX ORDER — BISACODYL 10 MG
10 SUPPOSITORY, RECTAL RECTAL
Status: DISCONTINUED | OUTPATIENT
Start: 2022-09-13 | End: 2022-09-17

## 2022-09-13 RX ORDER — POLYETHYLENE GLYCOL 3350 17 G/17G
17 POWDER, FOR SOLUTION ORAL DAILY PRN
Status: DISCONTINUED | OUTPATIENT
Start: 2022-09-13 | End: 2022-09-17

## 2022-09-13 NOTE — PLAN OF CARE
Patient given lasix 40mg in ER, has had very good UO since, primofit in place. Patient does not want to stay here, he wants to go home! IV lasix ordered BID. An echo was ordered, but per daughter Mireille just had one 3 days ago in the [address] office. Bedrest ordered at this time. Patient's daughter took home his wallet in case patient forgets. Problem: Patient Centered Care  Goal: Patient preferences are identified and integrated in the patient's plan of care  Description: Interventions:  - What would you like us to know as we care for you?  I live with my wife, and I want to go home!  - Provide timely, complete, and accurate information to patient/family  - Incorporate patient and family knowledge, values, beliefs, and cultural backgrounds into the planning and delivery of care  - Encourage patient/family to participate in care and decision-making at the level they choose  - Honor patient and family perspectives and choices  Outcome: Progressing     Problem: Patient/Family Goals  Goal: Patient/Family Long Term Goal  Description: Patient's Long Term Goal: go home and not have this breathing issue again    Interventions:  - medications as ordered  - complete testing as ordered  - know whom to follow up with on DC home  - See additional Care Plan goals for specific interventions  Outcome: Progressing  Goal: Patient/Family Short Term Goal  Description: Patient's Short Term Goal: breathe easier/better    Interventions:  - complete testing as ordered  - take medications as orderedInterventions:   - possible Echo this morning  - See additional Care Plan goals for specific interventions  Outcome: Progressing     Problem: CARDIOVASCULAR - ADULT  Goal: Maintains optimal cardiac output and hemodynamic stability  Description: INTERVENTIONS:  - Monitor vital signs, rhythm, and trends  - Monitor for bleeding, hypotension and signs of decreased cardiac output  - Evaluate effectiveness of vasoactive medications to optimize hemodynamic stability  - Monitor arterial and/or venous puncture sites for bleeding and/or hematoma  - Assess quality of pulses, skin color and temperature  - Assess for signs of decreased coronary artery perfusion - ex.  Angina  - Evaluate fluid balance, assess for edema, trend weights  Outcome: Progressing  Goal: Absence of cardiac arrhythmias or at baseline  Description: INTERVENTIONS:  - Continuous cardiac monitoring, monitor vital signs, obtain 12 lead EKG if indicated  - Evaluate effectiveness of antiarrhythmic and heart rate control medications as ordered  - Initiate emergency measures for life threatening arrhythmias  - Monitor electrolytes and administer replacement therapy as ordered  Outcome: Progressing     Problem: RESPIRATORY - ADULT  Goal: Achieves optimal ventilation and oxygenation  Description: INTERVENTIONS:  - Assess for changes in respiratory status  - Assess for changes in mentation and behavior  - Position to facilitate oxygenation and minimize respiratory effort  - Oxygen supplementation based on oxygen saturation or ABGs  - Provide Smoking Cessation handout, if applicable  - Encourage broncho-pulmonary hygiene including cough, deep breathe, Incentive Spirometry  - Assess the need for suctioning and perform as needed  - Assess and instruct to report SOB or any respiratory difficulty  - Respiratory Therapy support as indicated  - Manage/alleviate anxiety  - Monitor for signs/symptoms of CO2 retention  Outcome: Progressing     Problem: METABOLIC/FLUID AND ELECTROLYTES - ADULT  Goal: Electrolytes maintained within normal limits  Description: INTERVENTIONS:  - Monitor labs and rhythm and assess patient for signs and symptoms of electrolyte imbalances  - Administer electrolyte replacement as ordered  - Monitor response to electrolyte replacements, including rhythm and repeat lab results as appropriate  - Fluid restriction as ordered  - Instruct patient on fluid and nutrition restrictions as appropriate  Outcome: Progressing

## 2022-09-13 NOTE — CM/SW NOTE
PT rec: SNF  SW spoke to pt's spouse Ruthann Ashraf to gather collateral information. Pt's spouse confirms address on face sheet is correct. Pt resides w/spouse in a 2 story home w/1 external step to enter and 7 steps to the bedroom. Pt drives periodically and uses a cane at baseline to ambulate. Pt has [facility] hx w/[facility] and has also gone to op rehab. Spouse is refusing SNF placement, and is requesting HH. SW uploaded referral/F2F via [employer]. Will need agency acceptance/pt choice. Plan: DC home with [facility] pending agency acceptance, pt choice, and medical clearance. SW/CM to remain available for support and/or discharge planning.      JAIMIE Clayton Ra, [facility]  Social Work   NAGI:#[id number]

## 2022-09-13 NOTE — PLAN OF CARE
Patient being transferred to room 322. Wife at bedside and updated on plan of care. Report given to [facility] ANNA BARRON. Problem: Patient Centered Care  Goal: Patient preferences are identified and integrated in the patient's plan of care  Description: Interventions:  - What would you like us to know as we care for you? I live with my wife, and I want to go home!  - Provide timely, complete, and accurate information to patient/family  - Incorporate patient and family knowledge, values, beliefs, and cultural backgrounds into the planning and delivery of care  - Encourage patient/family to participate in care and decision-making at the level they choose  - Honor patient and family perspectives and choices  Outcome: Progressing     Problem: Patient/Family Goals  Goal: Patient/Family Long Term Goal  Description: Patient's Long Term Goal: go home and not have this breathing issue again    Interventions:  - medications as ordered  - complete testing as ordered  - know whom to follow up with on DC home  - See additional Care Plan goals for specific interventions  Outcome: Progressing  Goal: Patient/Family Short Term Goal  Description: Patient's Short Term Goal: breathe easier/better    Interventions:  - complete testing as ordered  - take medications as orderedInterventions:   - possible Echo this morning  - See additional Care Plan goals for specific interventions  Outcome: Progressing     Problem: CARDIOVASCULAR - ADULT  Goal: Maintains optimal cardiac output and hemodynamic stability  Description: INTERVENTIONS:  - Monitor vital signs, rhythm, and trends  - Monitor for bleeding, hypotension and signs of decreased cardiac output  - Evaluate effectiveness of vasoactive medications to optimize hemodynamic stability  - Monitor arterial and/or venous puncture sites for bleeding and/or hematoma  - Assess quality of pulses, skin color and temperature  - Assess for signs of decreased coronary artery perfusion - ex.  Angina  - Evaluate fluid balance, assess for edema, trend weights  Outcome: Progressing  Goal: Absence of cardiac arrhythmias or at baseline  Description: INTERVENTIONS:  - Continuous cardiac monitoring, monitor vital signs, obtain 12 lead EKG if indicated  - Evaluate effectiveness of antiarrhythmic and heart rate control medications as ordered  - Initiate emergency measures for life threatening arrhythmias  - Monitor electrolytes and administer replacement therapy as ordered  Outcome: Progressing     Problem: RESPIRATORY - ADULT  Goal: Achieves optimal ventilation and oxygenation  Description: INTERVENTIONS:  - Assess for changes in respiratory status  - Assess for changes in mentation and behavior  - Position to facilitate oxygenation and minimize respiratory effort  - Oxygen supplementation based on oxygen saturation or ABGs  - Provide Smoking Cessation handout, if applicable  - Encourage broncho-pulmonary hygiene including cough, deep breathe, Incentive Spirometry  - Assess the need for suctioning and perform as needed  - Assess and instruct to report SOB or any respiratory difficulty  - Respiratory Therapy support as indicated  - Manage/alleviate anxiety  - Monitor for signs/symptoms of CO2 retention  Outcome: Progressing     Problem: METABOLIC/FLUID AND ELECTROLYTES - ADULT  Goal: Electrolytes maintained within normal limits  Description: INTERVENTIONS:  - Monitor labs and rhythm and assess patient for signs and symptoms of electrolyte imbalances  - Administer electrolyte replacement as ordered  - Monitor response to electrolyte replacements, including rhythm and repeat lab results as appropriate  - Fluid restriction as ordered  - Instruct patient on fluid and nutrition restrictions as appropriate  Outcome: Progressing

## 2022-09-14 LAB
ANION GAP SERPL CALC-SCNC: 6 MMOL/L (ref 0–18)
BASOPHILS # BLD AUTO: 0.03 X10(3) UL (ref 0–0.2)
BASOPHILS NFR BLD AUTO: 0.5 %
BUN BLD-MCNC: 24 MG/DL (ref 7–18)
BUN/CREAT SERPL: 23.3 (ref 10–20)
CALCIUM BLD-MCNC: 8.2 MG/DL (ref 8.5–10.1)
CHLORIDE SERPL-SCNC: 108 MMOL/L (ref 98–112)
CO2 SERPL-SCNC: 29 MMOL/L (ref 21–32)
CREAT BLD-MCNC: 1.03 MG/DL
DEPRECATED RDW RBC AUTO: 55.8 FL (ref 35.1–46.3)
EOSINOPHIL # BLD AUTO: 0.05 X10(3) UL (ref 0–0.7)
EOSINOPHIL NFR BLD AUTO: 0.8 %
ERYTHROCYTE [DISTWIDTH] IN BLOOD BY AUTOMATED COUNT: 17.3 % (ref 11–15)
GFR SERPLBLD BASED ON 1.73 SQ M-ARVRAT: 70 ML/MIN/1.73M2 (ref 60–?)
GLUCOSE BLD-MCNC: 101 MG/DL (ref 70–99)
HCT VFR BLD AUTO: 33.1 %
HGB BLD-MCNC: 10.4 G/DL
IMM GRANULOCYTES # BLD AUTO: 0.02 X10(3) UL (ref 0–1)
IMM GRANULOCYTES NFR BLD: 0.3 %
LYMPHOCYTES # BLD AUTO: 0.53 X10(3) UL (ref 1–4)
LYMPHOCYTES NFR BLD AUTO: 8.9 %
MAGNESIUM SERPL-MCNC: 1.9 MG/DL (ref 1.6–2.6)
MCH RBC QN AUTO: 28.2 PG (ref 26–34)
MCHC RBC AUTO-ENTMCNC: 31.4 G/DL (ref 31–37)
MCV RBC AUTO: 89.7 FL
MONOCYTES # BLD AUTO: 1.07 X10(3) UL (ref 0.1–1)
MONOCYTES NFR BLD AUTO: 18 %
NEUTROPHILS # BLD AUTO: 4.23 X10 (3) UL (ref 1.5–7.7)
NEUTROPHILS # BLD AUTO: 4.23 X10(3) UL (ref 1.5–7.7)
NEUTROPHILS NFR BLD AUTO: 71.5 %
OSMOLALITY SERPL CALC.SUM OF ELEC: 300 MOSM/KG (ref 275–295)
PLATELET # BLD AUTO: 176 10(3)UL (ref 150–450)
POTASSIUM SERPL-SCNC: 3.5 MMOL/L (ref 3.5–5.1)
RBC # BLD AUTO: 3.69 X10(6)UL
SODIUM SERPL-SCNC: 143 MMOL/L (ref 136–145)
WBC # BLD AUTO: 5.9 X10(3) UL (ref 4–11)

## 2022-09-14 PROCEDURE — 80048 BASIC METABOLIC PNL TOTAL CA: CPT | Performed by: HOSPITALIST

## 2022-09-14 PROCEDURE — 83735 ASSAY OF MAGNESIUM: CPT | Performed by: HOSPITALIST

## 2022-09-14 PROCEDURE — 85025 COMPLETE CBC W/AUTO DIFF WBC: CPT | Performed by: HOSPITALIST

## 2022-09-14 RX ORDER — HEPARIN SODIUM 5000 [USP'U]/ML
5000 INJECTION, SOLUTION INTRAVENOUS; SUBCUTANEOUS EVERY 8 HOURS
Status: DISCONTINUED | OUTPATIENT
Start: 2022-09-14 | End: 2022-09-15

## 2022-09-14 RX ORDER — FUROSEMIDE 10 MG/ML
40 INJECTION INTRAMUSCULAR; INTRAVENOUS ONCE
Status: COMPLETED | OUTPATIENT
Start: 2022-09-14 | End: 2022-09-14

## 2022-09-14 RX ORDER — FUROSEMIDE 20 MG/1
20 TABLET ORAL DAILY
Status: DISCONTINUED | OUTPATIENT
Start: 2022-09-14 | End: 2022-09-17

## 2022-09-14 RX ORDER — FUROSEMIDE 10 MG/ML
40 INJECTION INTRAMUSCULAR; INTRAVENOUS ONCE
Status: COMPLETED | OUTPATIENT
Start: 2022-09-15 | End: 2022-09-15

## 2022-09-14 RX ORDER — QUETIAPINE FUMARATE 25 MG/1
25 TABLET, FILM COATED ORAL NIGHTLY PRN
Status: DISCONTINUED | OUTPATIENT
Start: 2022-09-14 | End: 2022-09-17

## 2022-09-14 NOTE — PLAN OF CARE
Pt is aox 2-3, forgetful at times. Very lethargic this AM d/t lack of sleep. Dr Sharmaine Matta aware, meds adjusted for tonight. Family deciding on rehab placement   Problem: Patient Centered Care  Goal: Patient preferences are identified and integrated in the patient's plan of care  Description: Interventions:  - What would you like us to know as we care for you?  I live with my wife, and I want to go home!  - Provide timely, complete, and accurate information to patient/family  - Incorporate patient and family knowledge, values, beliefs, and cultural backgrounds into the planning and delivery of care  - Encourage patient/family to participate in care and decision-making at the level they choose  - Honor patient and family perspectives and choices  Outcome: Progressing     Problem: Patient/Family Goals  Goal: Patient/Family Long Term Goal  Description: Patient's Long Term Goal: go home and not have this breathing issue again    Interventions:  - medications as ordered  - complete testing as ordered  - know whom to follow up with on DC home  - See additional Care Plan goals for specific interventions  Outcome: Progressing  Goal: Patient/Family Short Term Goal  Description: Patient's Short Term Goal: breathe easier/better    Interventions:  - complete testing as ordered  - take medications as orderedInterventions:   - possible Echo this morning  - See additional Care Plan goals for specific interventions  Outcome: Progressing     Problem: CARDIOVASCULAR - ADULT  Goal: Maintains optimal cardiac output and hemodynamic stability  Description: INTERVENTIONS:  - Monitor vital signs, rhythm, and trends  - Monitor for bleeding, hypotension and signs of decreased cardiac output  - Evaluate effectiveness of vasoactive medications to optimize hemodynamic stability  - Monitor arterial and/or venous puncture sites for bleeding and/or hematoma  - Assess quality of pulses, skin color and temperature  - Assess for signs of decreased coronary artery perfusion - ex.  Angina  - Evaluate fluid balance, assess for edema, trend weights  Outcome: Progressing  Goal: Absence of cardiac arrhythmias or at baseline  Description: INTERVENTIONS:  - Continuous cardiac monitoring, monitor vital signs, obtain 12 lead EKG if indicated  - Evaluate effectiveness of antiarrhythmic and heart rate control medications as ordered  - Initiate emergency measures for life threatening arrhythmias  - Monitor electrolytes and administer replacement therapy as ordered  Outcome: Progressing     Problem: RESPIRATORY - ADULT  Goal: Achieves optimal ventilation and oxygenation  Description: INTERVENTIONS:  - Assess for changes in respiratory status  - Assess for changes in mentation and behavior  - Position to facilitate oxygenation and minimize respiratory effort  - Oxygen supplementation based on oxygen saturation or ABGs  - Provide Smoking Cessation handout, if applicable  - Encourage broncho-pulmonary hygiene including cough, deep breathe, Incentive Spirometry  - Assess the need for suctioning and perform as needed  - Assess and instruct to report SOB or any respiratory difficulty  - Respiratory Therapy support as indicated  - Manage/alleviate anxiety  - Monitor for signs/symptoms of CO2 retention  Outcome: Progressing     Problem: METABOLIC/FLUID AND ELECTROLYTES - ADULT  Goal: Electrolytes maintained within normal limits  Description: INTERVENTIONS:  - Monitor labs and rhythm and assess patient for signs and symptoms of electrolyte imbalances  - Administer electrolyte replacement as ordered  - Monitor response to electrolyte replacements, including rhythm and repeat lab results as appropriate  - Fluid restriction as ordered  - Instruct patient on fluid and nutrition restrictions as appropriate  Outcome: Progressing

## 2022-09-14 NOTE — PLAN OF CARE
Patient became very confused/restless overnight, prn melatonin given with some improvement. Currently resting comfortably in bed. No reports of pain or discomfort. Call light within reach. Will continue to monitor. Problem: Patient Centered Care  Goal: Patient preferences are identified and integrated in the patient's plan of care  Description: Interventions:  - What would you like us to know as we care for you?  I live with my wife, and I want to go home!  - Provide timely, complete, and accurate information to patient/family  - Incorporate patient and family knowledge, values, beliefs, and cultural backgrounds into the planning and delivery of care  - Encourage patient/family to participate in care and decision-making at the level they choose  - Honor patient and family perspectives and choices  Outcome: Progressing     Problem: Patient/Family Goals  Goal: Patient/Family Long Term Goal  Description: Patient's Long Term Goal: go home and not have this breathing issue again    Interventions:  - medications as ordered  - complete testing as ordered  - know whom to follow up with on DC home  - See additional Care Plan goals for specific interventions  Outcome: Progressing  Goal: Patient/Family Short Term Goal  Description: Patient's Short Term Goal: breathe easier/better    Interventions:  - complete testing as ordered  - take medications as orderedInterventions:   - possible Echo this morning  - See additional Care Plan goals for specific interventions  Outcome: Progressing     Problem: CARDIOVASCULAR - ADULT  Goal: Maintains optimal cardiac output and hemodynamic stability  Description: INTERVENTIONS:  - Monitor vital signs, rhythm, and trends  - Monitor for bleeding, hypotension and signs of decreased cardiac output  - Evaluate effectiveness of vasoactive medications to optimize hemodynamic stability  - Monitor arterial and/or venous puncture sites for bleeding and/or hematoma  - Assess quality of pulses, skin color and temperature  - Assess for signs of decreased coronary artery perfusion - ex.  Angina  - Evaluate fluid balance, assess for edema, trend weights  Outcome: Progressing  Goal: Absence of cardiac arrhythmias or at baseline  Description: INTERVENTIONS:  - Continuous cardiac monitoring, monitor vital signs, obtain 12 lead EKG if indicated  - Evaluate effectiveness of antiarrhythmic and heart rate control medications as ordered  - Initiate emergency measures for life threatening arrhythmias  - Monitor electrolytes and administer replacement therapy as ordered  Outcome: Progressing     Problem: RESPIRATORY - ADULT  Goal: Achieves optimal ventilation and oxygenation  Description: INTERVENTIONS:  - Assess for changes in respiratory status  - Assess for changes in mentation and behavior  - Position to facilitate oxygenation and minimize respiratory effort  - Oxygen supplementation based on oxygen saturation or ABGs  - Provide Smoking Cessation handout, if applicable  - Encourage broncho-pulmonary hygiene including cough, deep breathe, Incentive Spirometry  - Assess the need for suctioning and perform as needed  - Assess and instruct to report SOB or any respiratory difficulty  - Respiratory Therapy support as indicated  - Manage/alleviate anxiety  - Monitor for signs/symptoms of CO2 retention  Outcome: Progressing     Problem: METABOLIC/FLUID AND ELECTROLYTES - ADULT  Goal: Electrolytes maintained within normal limits  Description: INTERVENTIONS:  - Monitor labs and rhythm and assess patient for signs and symptoms of electrolyte imbalances  - Administer electrolyte replacement as ordered  - Monitor response to electrolyte replacements, including rhythm and repeat lab results as appropriate  - Fluid restriction as ordered  - Instruct patient on fluid and nutrition restrictions as appropriate  Outcome: Progressing

## 2022-09-14 NOTE — CM/SW NOTE
10: 20AM  Per chart review, pt is mod-max assist x2 currently. Chad Agosto spoke w/ pt's wife yesterday - she is declining GILDA. SW sent pro-active GILDA referrals via [employer] for review. PASRR level 1 completed - no level 2 required. Document uploaded to [email or web address]. SW contacted pt's dtr Boyd Rank via phone to discuss pt's current needs. Per Boyd Rank, she believes pt's wife and family now leaning towards GILDA. Boyd Rank confirmed pt's wife cannot provide this kind of support at home at this time. SW informed her of pending referrals and GILDA list approx Irina Veras agreed to have SW f/up w/ her and other family members around that time to provide list for review. SW to meet w/ pt's family in pt's room at approx 1PM pending GILDA list is available. 01:00PM  SW met w/ pt, pt's wife, pt's dtr, and pt's sister & sister-in-law in his room. SW discussed GILDA recommendation and concern for pt returning home at this time as he is max assist x2. SW explained what would be required if pt and wife wanted pt to return home at this time. Pt's family agreeable to GILDA. SW explained GILDA referral process and provided list of GILDA choices w/ quality data. SW explained the data as well. Pt's family inquiring about other facilities near [facility] not on the list. SW explained that those other facilities reviewed but beds are very limited at this time due to local hospitals being full, including [facility]. Pt's wife inquired about [facility]. SW informed her that [facility] received referral but does not have a bed at this time. Per her request, ANÍBAL messaged [facility] and also contacted liaison Bhumi via phone - she is checking w/ their  to determine if bed would open up for tomorrow DC.    Pt's family also stating that MD mentioned [facility]. SW informed them that unfortunately pt is not appropriate for [facility] due to some of his impulsivity, lack of insight into his deficits, and Dementia.  [facility] and that their rooms are not easily visibile from RN station - thus being a risk of pt trying to get up on his own and falling. SW encouraged pt's family to review the provided list. SW explained that GILDA referral will be re-opened for the few facilities still pending review. Pt's family expressed understanding and will review the list provided. SW to update pt's dtr Maykel Wilburn via phone and email ([email or web address]) if any new facility options become available. *Received update from [facility] w/ [facility] - they can accept/will have bed available tomorrow if family is agreeable. SW printed quality data sheet for [facility] from [employer]. SW met w/ pt's wife and dtr in his room again. SW provided [facility] update and quality data. They confirmed they want to discuss w/ pt's other dtr but will call SW once final decision is made. PLAN: [facility] - pending choice & med clear     SW/CM to remain available for support and/or discharge planning.          Chani Castellano, MSW, [facility]

## 2022-09-15 ENCOUNTER — APPOINTMENT (OUTPATIENT)
Dept: CT IMAGING | Facility: HOSPITAL | Age: 87
End: 2022-09-15
Attending: HOSPITALIST
Payer: MEDICARE

## 2022-09-15 ENCOUNTER — APPOINTMENT (OUTPATIENT)
Dept: GENERAL RADIOLOGY | Facility: HOSPITAL | Age: 87
End: 2022-09-15
Attending: INTERNAL MEDICINE
Payer: MEDICARE

## 2022-09-15 LAB
ADENOVIRUS PCR:: NOT DETECTED
ANION GAP SERPL CALC-SCNC: 8 MMOL/L (ref 0–18)
B PARAPERT DNA SPEC QL NAA+PROBE: NOT DETECTED
B PERT DNA SPEC QL NAA+PROBE: NOT DETECTED
BASE EXCESS BLD CALC-SCNC: 10.3 MMOL/L (ref ?–2)
BILIRUB UR QL: NEGATIVE
BUN BLD-MCNC: 24 MG/DL (ref 7–18)
BUN/CREAT SERPL: 22.2 (ref 10–20)
C PNEUM DNA SPEC QL NAA+PROBE: NOT DETECTED
CALCIUM BLD-MCNC: 8.2 MG/DL (ref 8.5–10.1)
CHLORIDE SERPL-SCNC: 104 MMOL/L (ref 98–112)
CLARITY UR: CLEAR
CO2 SERPL-SCNC: 30 MMOL/L (ref 21–32)
COLOR UR: YELLOW
CORONAVIRUS 229E PCR:: NOT DETECTED
CORONAVIRUS HKU1 PCR:: NOT DETECTED
CORONAVIRUS NL63 PCR:: NOT DETECTED
CORONAVIRUS OC43 PCR:: NOT DETECTED
CREAT BLD-MCNC: 1.08 MG/DL
D DIMER PPP FEU-MCNC: 2.54 UG/ML FEU (ref ?–0.88)
DEPRECATED RDW RBC AUTO: 57.1 FL (ref 35.1–46.3)
ERYTHROCYTE [DISTWIDTH] IN BLOOD BY AUTOMATED COUNT: 17.5 % (ref 11–15)
FLUAV RNA SPEC QL NAA+PROBE: NOT DETECTED
FLUBV RNA SPEC QL NAA+PROBE: NOT DETECTED
GFR SERPLBLD BASED ON 1.73 SQ M-ARVRAT: 66 ML/MIN/1.73M2 (ref 60–?)
GLUCOSE BLD-MCNC: 92 MG/DL (ref 70–99)
GLUCOSE UR-MCNC: NEGATIVE MG/DL
HCO3 BLDA-SCNC: 32.9 MEQ/L (ref 21–27)
HCT VFR BLD AUTO: 34.9 %
HGB BLD-MCNC: 10.9 G/DL
KETONES UR-MCNC: NEGATIVE MG/DL
LACTATE SERPL-SCNC: 1.8 MMOL/L (ref 0.4–2)
LEUKOCYTE ESTERASE UR QL STRIP.AUTO: NEGATIVE
MAGNESIUM SERPL-MCNC: 1.9 MG/DL (ref 1.6–2.6)
MCH RBC QN AUTO: 28 PG (ref 26–34)
MCHC RBC AUTO-ENTMCNC: 31.2 G/DL (ref 31–37)
MCV RBC AUTO: 89.7 FL
METAPNEUMOVIRUS PCR:: NOT DETECTED
MODIFIED ALLEN TEST: POSITIVE
MYCOPLASMA PNEUMONIA PCR:: NOT DETECTED
NITRITE UR QL STRIP.AUTO: NEGATIVE
O2 CT BLD-SCNC: 14.5 VOL% (ref 15–23)
OSMOLALITY SERPL CALC.SUM OF ELEC: 298 MOSM/KG (ref 275–295)
OXYGEN LITERS/MINUTE: 3 L/MIN
PARAINFLUENZA 1 PCR:: NOT DETECTED
PARAINFLUENZA 2 PCR:: NOT DETECTED
PARAINFLUENZA 3 PCR:: NOT DETECTED
PARAINFLUENZA 4 PCR:: NOT DETECTED
PCO2 BLDA: 32 MM HG (ref 35–45)
PH BLDA: 7.61 [PH] (ref 7.35–7.45)
PH UR: 6 [PH] (ref 5–8)
PLATELET # BLD AUTO: 184 10(3)UL (ref 150–450)
PO2 BLDA: 100 MM HG (ref 80–100)
POTASSIUM SERPL-SCNC: 3.1 MMOL/L (ref 3.5–5.1)
PROCALCITONIN SERPL-MCNC: 0.27 NG/ML (ref ?–0.16)
PROT UR-MCNC: NEGATIVE MG/DL
PUNCTURE CHARGE: YES
RBC # BLD AUTO: 3.89 X10(6)UL
RHINOVIRUS/ENTERO PCR:: NOT DETECTED
RSV RNA SPEC QL NAA+PROBE: NOT DETECTED
SAO2 % BLDA: 98.8 % (ref 94–100)
SARS-COV-2 RNA NPH QL NAA+NON-PROBE: NOT DETECTED
SARS-COV-2 RNA RESP QL NAA+PROBE: NOT DETECTED
SODIUM SERPL-SCNC: 142 MMOL/L (ref 136–145)
SP GR UR STRIP: 1.01 (ref 1–1.03)
UROBILINOGEN UR STRIP-ACNC: 0.2
WBC # BLD AUTO: 6.7 X10(3) UL (ref 4–11)

## 2022-09-15 PROCEDURE — 81001 URINALYSIS AUTO W/SCOPE: CPT | Performed by: HOSPITALIST

## 2022-09-15 PROCEDURE — 71046 X-RAY EXAM CHEST 2 VIEWS: CPT | Performed by: INTERNAL MEDICINE

## 2022-09-15 PROCEDURE — 0202U NFCT DS 22 TRGT SARS-COV-2: CPT | Performed by: HOSPITALIST

## 2022-09-15 PROCEDURE — 84145 PROCALCITONIN (PCT): CPT | Performed by: HOSPITALIST

## 2022-09-15 PROCEDURE — 70450 CT HEAD/BRAIN W/O DYE: CPT | Performed by: HOSPITALIST

## 2022-09-15 PROCEDURE — 87040 BLOOD CULTURE FOR BACTERIA: CPT | Performed by: HOSPITALIST

## 2022-09-15 PROCEDURE — 71260 CT THORAX DX C+: CPT | Performed by: HOSPITALIST

## 2022-09-15 PROCEDURE — 85379 FIBRIN DEGRADATION QUANT: CPT | Performed by: HOSPITALIST

## 2022-09-15 PROCEDURE — 83735 ASSAY OF MAGNESIUM: CPT | Performed by: HOSPITALIST

## 2022-09-15 PROCEDURE — 36600 WITHDRAWAL OF ARTERIAL BLOOD: CPT | Performed by: HOSPITALIST

## 2022-09-15 PROCEDURE — 81015 MICROSCOPIC EXAM OF URINE: CPT | Performed by: HOSPITALIST

## 2022-09-15 PROCEDURE — 85027 COMPLETE CBC AUTOMATED: CPT | Performed by: HOSPITALIST

## 2022-09-15 PROCEDURE — 83605 ASSAY OF LACTIC ACID: CPT | Performed by: HOSPITALIST

## 2022-09-15 PROCEDURE — 80048 BASIC METABOLIC PNL TOTAL CA: CPT | Performed by: INTERNAL MEDICINE

## 2022-09-15 PROCEDURE — 82805 BLOOD GASES W/O2 SATURATION: CPT | Performed by: HOSPITALIST

## 2022-09-15 RX ORDER — ENOXAPARIN SODIUM 100 MG/ML
1 INJECTION SUBCUTANEOUS EVERY 12 HOURS SCHEDULED
Status: DISCONTINUED | OUTPATIENT
Start: 2022-09-15 | End: 2022-09-17

## 2022-09-15 RX ORDER — POTASSIUM CHLORIDE 20 MEQ/1
40 TABLET, EXTENDED RELEASE ORAL ONCE
Status: COMPLETED | OUTPATIENT
Start: 2022-09-15 | End: 2022-09-15

## 2022-09-15 RX ORDER — QUETIAPINE FUMARATE 25 MG/1
25 TABLET, FILM COATED ORAL NIGHTLY PRN
Qty: 10 TABLET | Refills: 0 | Status: SHIPPED | OUTPATIENT
Start: 2022-09-15

## 2022-09-15 NOTE — PLAN OF CARE
Pt alert and oriented x2-3, on RA, VS stable. Pt is doing better overnight after PRN meds. Plan is to continue to diurese for one more day, then [facility] ([facility]) pending med clear and ins. Auth. Fall precaution maintained, call light within reach, will continue to monitor  Problem: Patient Centered Care  Goal: Patient preferences are identified and integrated in the patient's plan of care  Description: Interventions:  - What would you like us to know as we care for you?  I live with my wife, and I want to go home!  - Provide timely, complete, and accurate information to patient/family  - Incorporate patient and family knowledge, values, beliefs, and cultural backgrounds into the planning and delivery of care  - Encourage patient/family to participate in care and decision-making at the level they choose  - Honor patient and family perspectives and choices  Outcome: Progressing     Problem: Patient/Family Goals  Goal: Patient/Family Long Term Goal  Description: Patient's Long Term Goal: go home and not have this breathing issue again    Interventions:  - medications as ordered  - complete testing as ordered  - know whom to follow up with on DC home  - See additional Care Plan goals for specific interventions  Outcome: Progressing  Goal: Patient/Family Short Term Goal  Description: Patient's Short Term Goal: breathe easier/better    Interventions:  - complete testing as ordered  - take medications as orderedInterventions:   - possible Echo this morning  - See additional Care Plan goals for specific interventions  Outcome: Progressing     Problem: CARDIOVASCULAR - ADULT  Goal: Maintains optimal cardiac output and hemodynamic stability  Description: INTERVENTIONS:  - Monitor vital signs, rhythm, and trends  - Monitor for bleeding, hypotension and signs of decreased cardiac output  - Evaluate effectiveness of vasoactive medications to optimize hemodynamic stability  - Monitor arterial and/or venous puncture sites for bleeding and/or hematoma  - Assess quality of pulses, skin color and temperature  - Assess for signs of decreased coronary artery perfusion - ex.  Angina  - Evaluate fluid balance, assess for edema, trend weights  Outcome: Progressing  Goal: Absence of cardiac arrhythmias or at baseline  Description: INTERVENTIONS:  - Continuous cardiac monitoring, monitor vital signs, obtain 12 lead EKG if indicated  - Evaluate effectiveness of antiarrhythmic and heart rate control medications as ordered  - Initiate emergency measures for life threatening arrhythmias  - Monitor electrolytes and administer replacement therapy as ordered  Outcome: Progressing     Problem: RESPIRATORY - ADULT  Goal: Achieves optimal ventilation and oxygenation  Description: INTERVENTIONS:  - Assess for changes in respiratory status  - Assess for changes in mentation and behavior  - Position to facilitate oxygenation and minimize respiratory effort  - Oxygen supplementation based on oxygen saturation or ABGs  - Provide Smoking Cessation handout, if applicable  - Encourage broncho-pulmonary hygiene including cough, deep breathe, Incentive Spirometry  - Assess the need for suctioning and perform as needed  - Assess and instruct to report SOB or any respiratory difficulty  - Respiratory Therapy support as indicated  - Manage/alleviate anxiety  - Monitor for signs/symptoms of CO2 retention  Outcome: Progressing     Problem: METABOLIC/FLUID AND ELECTROLYTES - ADULT  Goal: Electrolytes maintained within normal limits  Description: INTERVENTIONS:  - Monitor labs and rhythm and assess patient for signs and symptoms of electrolyte imbalances  - Administer electrolyte replacement as ordered  - Monitor response to electrolyte replacements, including rhythm and repeat lab results as appropriate  - Fluid restriction as ordered  - Instruct patient on fluid and nutrition restrictions as appropriate  Outcome: Progressing

## 2022-09-15 NOTE — CM/SW NOTE
Received VMail overnight from pt's dtr Musa Farris - they have chosen [facility] for [facility] at DC. ANÍBAL contacted Musa Farris via phone and confirmed choice. ANÍBAL explained next steps once pt is medically cleared. [facility] reserved via [employer]. ANÍBAL spoke to [facility] (max assist/confused) set on WILL CALL for 9/15, 9/16, 9/17, and 9/18. PCS completed in [facility] and will need date added day of actual DC. IF patient is cleared for DC today, MD to document: \"Patient requires skilled care needs at [facility] due to (insert dx) UNDER CMS [id number]"    Rapid COVID needed prior to DC. PLAN: [facility], Ambulance on [address] [facility], PCS completed (needs date) - pending med clear & rapid COVID      SW/JENNIFER to remain available for support and/or discharge planning.          Karri Deleon, MSW, [address] [facility]

## 2022-09-15 NOTE — PLAN OF CARE
Mireille is A&Ox2 on 3L with no c/o of pain. Patient K+ was low this morning and replaced per-protocol. Patient on PO lasix being held per cardiology. Patient bladder scan and straight cath today. See flow sheet. MD aware patient is running low grade fever. Labs drawn and CT PE and CT head ordered. See orders. Patient plan to go [facility] once medically cleared and pending insurance. Problem: Patient Centered Care  Goal: Patient preferences are identified and integrated in the patient's plan of care  Description: Interventions:  - What would you like us to know as we care for you?  I live with my wife, and I want to go home!  - Provide timely, complete, and accurate information to patient/family  - Incorporate patient and family knowledge, values, beliefs, and cultural backgrounds into the planning and delivery of care  - Encourage patient/family to participate in care and decision-making at the level they choose  - Honor patient and family perspectives and choices  Outcome: Progressing     Problem: Patient/Family Goals  Goal: Patient/Family Long Term Goal  Description: Patient's Long Term Goal: go home and not have this breathing issue again    Interventions:  - medications as ordered  - complete testing as ordered  - know whom to follow up with on DC home  - See additional Care Plan goals for specific interventions  Outcome: Progressing  Goal: Patient/Family Short Term Goal  Description: Patient's Short Term Goal: breathe easier/better    Interventions:  - complete testing as ordered  - take medications as orderedInterventions:   - possible Echo this morning  - See additional Care Plan goals for specific interventions  Outcome: Progressing     Problem: CARDIOVASCULAR - ADULT  Goal: Maintains optimal cardiac output and hemodynamic stability  Description: INTERVENTIONS:  - Monitor vital signs, rhythm, and trends  - Monitor for bleeding, hypotension and signs of decreased cardiac output  - Evaluate effectiveness of vasoactive medications to optimize hemodynamic stability  - Monitor arterial and/or venous puncture sites for bleeding and/or hematoma  - Assess quality of pulses, skin color and temperature  - Assess for signs of decreased coronary artery perfusion - ex.  Angina  - Evaluate fluid balance, assess for edema, trend weights  Outcome: Progressing  Goal: Absence of cardiac arrhythmias or at baseline  Description: INTERVENTIONS:  - Continuous cardiac monitoring, monitor vital signs, obtain 12 lead EKG if indicated  - Evaluate effectiveness of antiarrhythmic and heart rate control medications as ordered  - Initiate emergency measures for life threatening arrhythmias  - Monitor electrolytes and administer replacement therapy as ordered  Outcome: Progressing     Problem: RESPIRATORY - ADULT  Goal: Achieves optimal ventilation and oxygenation  Description: INTERVENTIONS:  - Assess for changes in respiratory status  - Assess for changes in mentation and behavior  - Position to facilitate oxygenation and minimize respiratory effort  - Oxygen supplementation based on oxygen saturation or ABGs  - Provide Smoking Cessation handout, if applicable  - Encourage broncho-pulmonary hygiene including cough, deep breathe, Incentive Spirometry  - Assess the need for suctioning and perform as needed  - Assess and instruct to report SOB or any respiratory difficulty  - Respiratory Therapy support as indicated  - Manage/alleviate anxiety  - Monitor for signs/symptoms of CO2 retention  Outcome: Progressing     Problem: METABOLIC/FLUID AND ELECTROLYTES - ADULT  Goal: Electrolytes maintained within normal limits  Description: INTERVENTIONS:  - Monitor labs and rhythm and assess patient for signs and symptoms of electrolyte imbalances  - Administer electrolyte replacement as ordered  - Monitor response to electrolyte replacements, including rhythm and repeat lab results as appropriate  - Fluid restriction as ordered  - Instruct patient on fluid and nutrition restrictions as appropriate  Outcome: Progressing

## 2022-09-16 ENCOUNTER — APPOINTMENT (OUTPATIENT)
Dept: ULTRASOUND IMAGING | Facility: HOSPITAL | Age: 87
End: 2022-09-16
Attending: NURSE PRACTITIONER
Payer: MEDICARE

## 2022-09-16 LAB
ANION GAP SERPL CALC-SCNC: 6 MMOL/L (ref 0–18)
BUN BLD-MCNC: 30 MG/DL (ref 7–18)
BUN/CREAT SERPL: 28.8 (ref 10–20)
CALCIUM BLD-MCNC: 8.2 MG/DL (ref 8.5–10.1)
CHLORIDE SERPL-SCNC: 103 MMOL/L (ref 98–112)
CO2 SERPL-SCNC: 31 MMOL/L (ref 21–32)
CREAT BLD-MCNC: 1.04 MG/DL
DEPRECATED RDW RBC AUTO: 54.4 FL (ref 35.1–46.3)
ERYTHROCYTE [DISTWIDTH] IN BLOOD BY AUTOMATED COUNT: 17.1 % (ref 11–15)
GFR SERPLBLD BASED ON 1.73 SQ M-ARVRAT: 69 ML/MIN/1.73M2 (ref 60–?)
GLUCOSE BLD-MCNC: 90 MG/DL (ref 70–99)
HCT VFR BLD AUTO: 32.7 %
HGB BLD-MCNC: 10.4 G/DL
MCH RBC QN AUTO: 28.1 PG (ref 26–34)
MCHC RBC AUTO-ENTMCNC: 31.8 G/DL (ref 31–37)
MCV RBC AUTO: 88.4 FL
OSMOLALITY SERPL CALC.SUM OF ELEC: 296 MOSM/KG (ref 275–295)
PLATELET # BLD AUTO: 167 10(3)UL (ref 150–450)
POTASSIUM SERPL-SCNC: 3.1 MMOL/L (ref 3.5–5.1)
POTASSIUM SERPL-SCNC: 3.1 MMOL/L (ref 3.5–5.1)
PROCALCITONIN SERPL-MCNC: 0.46 NG/ML (ref ?–0.16)
RBC # BLD AUTO: 3.7 X10(6)UL
SODIUM SERPL-SCNC: 140 MMOL/L (ref 136–145)
WBC # BLD AUTO: 5.4 X10(3) UL (ref 4–11)

## 2022-09-16 PROCEDURE — 84145 PROCALCITONIN (PCT): CPT | Performed by: INTERNAL MEDICINE

## 2022-09-16 PROCEDURE — 93970 EXTREMITY STUDY: CPT | Performed by: NURSE PRACTITIONER

## 2022-09-16 PROCEDURE — 84132 ASSAY OF SERUM POTASSIUM: CPT | Performed by: HOSPITALIST

## 2022-09-16 PROCEDURE — 80048 BASIC METABOLIC PNL TOTAL CA: CPT | Performed by: INTERNAL MEDICINE

## 2022-09-16 PROCEDURE — 85027 COMPLETE CBC AUTOMATED: CPT | Performed by: HOSPITALIST

## 2022-09-16 PROCEDURE — 92610 EVALUATE SWALLOWING FUNCTION: CPT

## 2022-09-16 RX ORDER — SENNOSIDES 8.6 MG
8.6 TABLET ORAL 2 TIMES DAILY
Status: DISCONTINUED | OUTPATIENT
Start: 2022-09-16 | End: 2022-09-17

## 2022-09-16 RX ORDER — POTASSIUM CHLORIDE 20 MEQ/1
40 TABLET, EXTENDED RELEASE ORAL ONCE
Status: COMPLETED | OUTPATIENT
Start: 2022-09-16 | End: 2022-09-16

## 2022-09-16 RX ORDER — POTASSIUM CHLORIDE 1.5 G/1.77G
40 POWDER, FOR SOLUTION ORAL ONCE
Status: COMPLETED | OUTPATIENT
Start: 2022-09-16 | End: 2022-09-16

## 2022-09-16 RX ORDER — VANCOMYCIN HYDROCHLORIDE 125 MG/1
125 CAPSULE ORAL DAILY
Status: DISCONTINUED | OUTPATIENT
Start: 2022-09-16 | End: 2022-09-17

## 2022-09-16 RX ORDER — DOCUSATE SODIUM 100 MG/1
100 CAPSULE, LIQUID FILLED ORAL 2 TIMES DAILY
Status: DISCONTINUED | OUTPATIENT
Start: 2022-09-16 | End: 2022-09-17

## 2022-09-16 RX ORDER — POLYETHYLENE GLYCOL 3350 17 G/17G
17 POWDER, FOR SOLUTION ORAL DAILY PRN
Status: DISCONTINUED | OUTPATIENT
Start: 2022-09-16 | End: 2022-09-17

## 2022-09-16 RX ORDER — BISACODYL 10 MG
10 SUPPOSITORY, RECTAL RECTAL
Status: DISCONTINUED | OUTPATIENT
Start: 2022-09-16 | End: 2022-09-17

## 2022-09-16 RX ORDER — FUROSEMIDE 10 MG/ML
40 INJECTION INTRAMUSCULAR; INTRAVENOUS ONCE
Status: COMPLETED | OUTPATIENT
Start: 2022-09-16 | End: 2022-09-16

## 2022-09-16 NOTE — PHYSICAL THERAPY NOTE
Attempt made for physical therapy treatment. Patient not appropriate to work with PT today - per cardiology, plan for US BLE to rule-out DVT. Patient's CT of chest demonstrated small foci of thrombi embolus --pending further medical plan of this (per cardiology: \"Will defer to heme/pulm regards to [address] [place] [address] for PE\"). Will continue to follow.      Payal Parrish, PT, DPT  [facility]  Ext: [phone]

## 2022-09-16 NOTE — PROGRESS NOTES
[facility]    CDI Prediction Tool Protocol (Vancomycin Initiated)    OVP (oral vancomycin prophylaxis) 125 mg PO Daily is being started in this patient based on a score of 16.1. Score Breakdown:  History of CDI > 1 year ago AND high risk antibiotic use (8 points)  High risk antibiotic use (5 points)  Age >/= 80 years (3 points)  History of CDI regardless of high risk antibiotic use (0.1 point)    This patient is currently at high risk for developing CDI due to his/her score being >/=13 points and is being started on prophylactic oral vancomycin to prevent Cdiff. This patient does not have C. difficile infection. All measures taken within this protocol are to decrease the risk of CDI development.     Gabriela Deshpande, PharmD  9/16/2022  7:05 AM  [place]  [facility]armacy Extension: [phone]

## 2022-09-16 NOTE — PLAN OF CARE
Mireille is A&Ox2 on 3L of oxygen with no c/o of pain. Patient K+ replaced today. Patient on IV abx. Patient had US doppler of legs today. See results Patient seen by SLP/OT and Pulmonary today. See notes and lab orders. Problem: Patient Centered Care  Goal: Patient preferences are identified and integrated in the patient's plan of care  Description: Interventions:  - What would you like us to know as we care for you?  I live with my wife, and I want to go home!  - Provide timely, complete, and accurate information to patient/family  - Incorporate patient and family knowledge, values, beliefs, and cultural backgrounds into the planning and delivery of care  - Encourage patient/family to participate in care and decision-making at the level they choose  - Honor patient and family perspectives and choices  Outcome: Progressing     Problem: Patient/Family Goals  Goal: Patient/Family Long Term Goal  Description: Patient's Long Term Goal: go home and not have this breathing issue again    Interventions:  - medications as ordered  - complete testing as ordered  - know whom to follow up with on DC home  - See additional Care Plan goals for specific interventions  Outcome: Progressing  Goal: Patient/Family Short Term Goal  Description: Patient's Short Term Goal: breathe easier/better    Interventions:  - complete testing as ordered  - take medications as orderedInterventions:   - possible Echo this morning  - See additional Care Plan goals for specific interventions  Outcome: Progressing     Problem: CARDIOVASCULAR - ADULT  Goal: Maintains optimal cardiac output and hemodynamic stability  Description: INTERVENTIONS:  - Monitor vital signs, rhythm, and trends  - Monitor for bleeding, hypotension and signs of decreased cardiac output  - Evaluate effectiveness of vasoactive medications to optimize hemodynamic stability  - Monitor arterial and/or venous puncture sites for bleeding and/or hematoma  - Assess quality of pulses, skin color and temperature  - Assess for signs of decreased coronary artery perfusion - ex.  Angina  - Evaluate fluid balance, assess for edema, trend weights  Outcome: Progressing  Goal: Absence of cardiac arrhythmias or at baseline  Description: INTERVENTIONS:  - Continuous cardiac monitoring, monitor vital signs, obtain 12 lead EKG if indicated  - Evaluate effectiveness of antiarrhythmic and heart rate control medications as ordered  - Initiate emergency measures for life threatening arrhythmias  - Monitor electrolytes and administer replacement therapy as ordered  Outcome: Progressing     Problem: RESPIRATORY - ADULT  Goal: Achieves optimal ventilation and oxygenation  Description: INTERVENTIONS:  - Assess for changes in respiratory status  - Assess for changes in mentation and behavior  - Position to facilitate oxygenation and minimize respiratory effort  - Oxygen supplementation based on oxygen saturation or ABGs  - Provide Smoking Cessation handout, if applicable  - Encourage broncho-pulmonary hygiene including cough, deep breathe, Incentive Spirometry  - Assess the need for suctioning and perform as needed  - Assess and instruct to report SOB or any respiratory difficulty  - Respiratory Therapy support as indicated  - Manage/alleviate anxiety  - Monitor for signs/symptoms of CO2 retention  Outcome: Progressing     Problem: METABOLIC/FLUID AND ELECTROLYTES - ADULT  Goal: Electrolytes maintained within normal limits  Description: INTERVENTIONS:  - Monitor labs and rhythm and assess patient for signs and symptoms of electrolyte imbalances  - Administer electrolyte replacement as ordered  - Monitor response to electrolyte replacements, including rhythm and repeat lab results as appropriate  - Fluid restriction as ordered  - Instruct patient on fluid and nutrition restrictions as appropriate  Outcome: Progressing

## 2022-09-16 NOTE — OCCUPATIONAL THERAPY NOTE
Attempt made for occupational therapy treatment. Pt not appropriate to work with OT today - per cardiology, plan for US BLE to rule-out DVT. Pt's CT of chest demonstrated small foci of thrombi embolus --pending further medical plan of this (per cardiology: \"Will defer to heme/pulm regards to [address] [place] [address] for PE\"). Will continue to follow.

## 2022-09-17 VITALS
TEMPERATURE: 98 F | RESPIRATION RATE: 18 BRPM | DIASTOLIC BLOOD PRESSURE: 63 MMHG | BODY MASS INDEX: 24.21 KG/M2 | SYSTOLIC BLOOD PRESSURE: 108 MMHG | OXYGEN SATURATION: 96 % | WEIGHT: 141.81 LBS | HEART RATE: 70 BPM | HEIGHT: 64 IN

## 2022-09-17 LAB
ANION GAP SERPL CALC-SCNC: 3 MMOL/L (ref 0–18)
BUN BLD-MCNC: 35 MG/DL (ref 7–18)
BUN/CREAT SERPL: 28.2 (ref 10–20)
CALCIUM BLD-MCNC: 8.2 MG/DL (ref 8.5–10.1)
CHLORIDE SERPL-SCNC: 106 MMOL/L (ref 98–112)
CO2 SERPL-SCNC: 32 MMOL/L (ref 21–32)
CREAT BLD-MCNC: 1.24 MG/DL
GFR SERPLBLD BASED ON 1.73 SQ M-ARVRAT: 56 ML/MIN/1.73M2 (ref 60–?)
GLUCOSE BLD-MCNC: 96 MG/DL (ref 70–99)
OSMOLALITY SERPL CALC.SUM OF ELEC: 300 MOSM/KG (ref 275–295)
POTASSIUM SERPL-SCNC: 3.6 MMOL/L (ref 3.5–5.1)
POTASSIUM SERPL-SCNC: 3.6 MMOL/L (ref 3.5–5.1)
SARS-COV-2 RNA RESP QL NAA+PROBE: NOT DETECTED
SODIUM SERPL-SCNC: 141 MMOL/L (ref 136–145)

## 2022-09-17 PROCEDURE — 97530 THERAPEUTIC ACTIVITIES: CPT

## 2022-09-17 PROCEDURE — 84132 ASSAY OF SERUM POTASSIUM: CPT | Performed by: HOSPITALIST

## 2022-09-17 PROCEDURE — 92526 ORAL FUNCTION THERAPY: CPT

## 2022-09-17 PROCEDURE — 80048 BASIC METABOLIC PNL TOTAL CA: CPT | Performed by: INTERNAL MEDICINE

## 2022-09-17 PROCEDURE — 97116 GAIT TRAINING THERAPY: CPT

## 2022-09-17 RX ORDER — AMOXICILLIN AND CLAVULANATE POTASSIUM 875; 125 MG/1; MG/1
1 TABLET, FILM COATED ORAL 2 TIMES DAILY
Qty: 10 TABLET | Refills: 0 | Status: SHIPPED | OUTPATIENT
Start: 2022-09-18 | End: 2022-09-23

## 2022-09-17 RX ORDER — PSEUDOEPHEDRINE HCL 30 MG
100 TABLET ORAL 2 TIMES DAILY
Refills: 0 | Status: SHIPPED | COMMUNITY
Start: 2022-09-17

## 2022-09-17 NOTE — CM/SW NOTE
09/15/22 0914   Discharge disposition   Expected discharge disposition [id number] Provider   Barbara Arellano of [facility] [address])   [facility]       Pt discussed with Dr. Idell Bernheim and RN Edwar Lynch. Pt dc today to [employer] [facility]. RN to call report to [facility] [phone]  Rapid Covid needed. Order entered. Plan: [facility] today via [facility] @330. PCS done. Pt wife and daughter notified of dc time and OOP cost. Both agree to this plan. Khoa Little.  Shamir Saucedo RN, BSN  Nurse   [phone]

## 2022-09-17 NOTE — PROGRESS NOTES
09/16/22 1913   Clinical Encounter Type   Visited With Patient and family together   Routine Visit Introduction   Referral From Nurse   Referral To [facility] Other (Comment)  (Wife describes being upset by pt's anger directed at her.)   Family Participation in [address] During Treatment Consistently   Taxonomy   Intended Effects Promote a sense of peace   Methods Encourage self care; Offer emotional support;Encourage sharing of feelings   Interventions Acknowledge current situation; Acknowledge response to difficult experience; Active listening;Ask questions to bring forth feelings; Discuss coping mechanism with someone; Discuss frustrations with someone; Facilitate communication between patient/family member(s); Identify supportive relationship(s)       Discussion:  responded to RN request to assist family to improve communication with pt. Pt seen bedside with head of bed elevated. Wife, daughter Kristy aPz, and two granddaughters all engaged in discussion regarding difficulty communicating with pt. Wife appears to be struggling with pt's mental status and behavior. Daughter noted that these changes have been more noticeable in the last six months.  facilitated discussion regarding potential strategies to address the situation. Discussed with RN after family visit, suggesting family education regarding pt's mental condition.  follow-up visits are available prn and can be contacted at [phone].     Jayden Burt, Chaplain Resident

## 2022-09-17 NOTE — PLAN OF CARE
Patient alert x2, currently on [facility]. Patient able to void spontaneously with urinal,  but still retaining urine. Straight cathx1 this morning. Frequent nurse rounding done, camera in place. Problem: Patient Centered Care  Goal: Patient preferences are identified and integrated in the patient's plan of care  Description: Interventions:  - What would you like us to know as we care for you?  I live with my wife, and I want to go home!  - Provide timely, complete, and accurate information to patient/family  - Incorporate patient and family knowledge, values, beliefs, and cultural backgrounds into the planning and delivery of care  - Encourage patient/family to participate in care and decision-making at the level they choose  - Honor patient and family perspectives and choices  Outcome: Progressing     Problem: Patient/Family Goals  Goal: Patient/Family Long Term Goal  Description: Patient's Long Term Goal: go home and not have this breathing issue again    Interventions:  - medications as ordered  - complete testing as ordered  - know whom to follow up with on DC home  - See additional Care Plan goals for specific interventions  Outcome: Progressing  Goal: Patient/Family Short Term Goal  Description: Patient's Short Term Goal: breathe easier/better    Interventions:  - complete testing as ordered  - take medications as orderedInterventions:   - possible Echo this morning  - See additional Care Plan goals for specific interventions  Outcome: Progressing     Problem: CARDIOVASCULAR - ADULT  Goal: Maintains optimal cardiac output and hemodynamic stability  Description: INTERVENTIONS:  - Monitor vital signs, rhythm, and trends  - Monitor for bleeding, hypotension and signs of decreased cardiac output  - Evaluate effectiveness of vasoactive medications to optimize hemodynamic stability  - Monitor arterial and/or venous puncture sites for bleeding and/or hematoma  - Assess quality of pulses, skin color and temperature  - Assess for signs of decreased coronary artery perfusion - ex.  Angina  - Evaluate fluid balance, assess for edema, trend weights  Outcome: Progressing  Goal: Absence of cardiac arrhythmias or at baseline  Description: INTERVENTIONS:  - Continuous cardiac monitoring, monitor vital signs, obtain 12 lead EKG if indicated  - Evaluate effectiveness of antiarrhythmic and heart rate control medications as ordered  - Initiate emergency measures for life threatening arrhythmias  - Monitor electrolytes and administer replacement therapy as ordered  Outcome: Progressing     Problem: RESPIRATORY - ADULT  Goal: Achieves optimal ventilation and oxygenation  Description: INTERVENTIONS:  - Assess for changes in respiratory status  - Assess for changes in mentation and behavior  - Position to facilitate oxygenation and minimize respiratory effort  - Oxygen supplementation based on oxygen saturation or ABGs  - Provide Smoking Cessation handout, if applicable  - Encourage broncho-pulmonary hygiene including cough, deep breathe, Incentive Spirometry  - Assess the need for suctioning and perform as needed  - Assess and instruct to report SOB or any respiratory difficulty  - Respiratory Therapy support as indicated  - Manage/alleviate anxiety  - Monitor for signs/symptoms of CO2 retention  Outcome: Progressing     Problem: METABOLIC/FLUID AND ELECTROLYTES - ADULT  Goal: Electrolytes maintained within normal limits  Description: INTERVENTIONS:  - Monitor labs and rhythm and assess patient for signs and symptoms of electrolyte imbalances  - Administer electrolyte replacement as ordered  - Monitor response to electrolyte replacements, including rhythm and repeat lab results as appropriate  - Fluid restriction as ordered  - Instruct patient on fluid and nutrition restrictions as appropriate  Outcome: Progressing

## 2022-09-17 NOTE — PHYSICAL THERAPY NOTE
PHYSICAL THERAPY TREATMENT NOTE - INPATIENT     Room Number: 746/308-M       Presenting Problem: SOB and weakness, acute on chronic CHF  Co-Morbidities : prostrate cancer, pacemaker    Problem List  Principal Problem:    Acute on chronic congestive heart failure, unspecified heart failure type (HCC)  Active Problems:    Dyspnea on exertion    Acute on chronic congestive heart failure ([address]      PHYSICAL THERAPY ASSESSMENT     Pt seen for therapy and he was agreeable. His family at side. Pt supine in bed. [facility] Re[facility] tech assisting therapist. Pt transferred supine to sit with mod A with education on proper body mechanics , pt needed max - mod A to scoot to edge and build up proprioception with weight shifting. Pt with multiple lines. Pt sit to stand with RW with mod  A x 1 for 5 ft to [facility] chair. Pt if walking more distance needs 2 ppl assist. Once sitting worked on [address] [place] [address] in chair. The patient's Approx Degree of Impairment: 68.66% has been calculated based on documentation in the [facility] '6 clicks' Inpatient Basic Mobility Short Form. Research supports that patients with this level of impairment may benefit from subacute rehab. DISCHARGE RECOMMENDATIONS  PT Discharge Recommendations: Sub-acute rehabilitation     PLAN  PT Treatment Plan: Bed mobility; Body mechanics; Coordination; Endurance; Energy conservation;Gait training;Range of motion  Frequency (Obs): 3-5x/week    SUBJECTIVE  I want to sit in that chair     OBJECTIVE  Precautions: Cardiac;Bed/chair alarm;Hard of hearing    WEIGHT BEARING RESTRICTION                PAIN ASSESSMENT   Ratin          BALANCE  Static Sitting: Fair -  Dynamic Sitting: Poor +  Static Standing: Poor  Dynamic Standing: Poor -    ACTIVITY TOLERANCE                          O2 WALK       AM-PAC '6-Clicks' INPATIENT SHORT FORM - BASIC MOBILITY  How much difficulty does the patient currently have. ..   Patient Difficulty: Turning over in bed (including adjusting bedclothes, sheets and blankets)?: A Little   Patient Difficulty: Sitting down on and standing up from a chair with arms (e.g., wheelchair, bedside commode, etc.): A Lot   Patient Difficulty: Moving from lying on back to sitting on the side of the bed?: A Lot   How much help from another person does the patient currently need. .. Help from Another: Moving to and from a bed to a chair (including a wheelchair)?: A Lot   Help from Another: Need to walk in hospital room?: A Lot   Help from Another: Climbing 3-5 steps with a railing?: Total     AM-PAC Score:  Raw Score: 12   Approx Degree of Impairment: 68.66%   Standardized Score (AM-PAC Scale): 35.33   CMS Modifier (G-Code): CL    FUNCTIONAL ABILITY STATUS  Functional Mobility/Gait Assessment  Gait Assistance: Moderate assistance  Distance (ft): 5 ft   Assistive Device: Rolling walker  Pattern: Shuffle    Additional information:     THERAPEUTIC EXERCISES  Lower Extremity      Position        Patient End of Session: Up in chair;Needs met;Call light within reach; Family present    CURRENT GOALS     Goals to be met by: 9/27/22  Patient Goal Patient's self-stated goal is: return to PLOF   Goal #1 Patient is able to demonstrate supine - sit EOB @ level: CG     Goal #1   Current Status    Goal #2 Patient is able to demonstrate transfers Sit to/from Stand at assistance level: CG with walker - rolling     Goal #2  Current Status Mod A    Goal #3 Patient is able to ambulate 50 feet with assist device: walker - rolling at assistance level: CG   Goal #3   Current Status 5 ft with RW with mod A , more safe with 2 person assist for right now   Goal #4 Patient to demonstrate independence with home activity/exercise instructions provided to patient in preparation for discharge.    Goal #4   Current Status

## 2022-09-17 NOTE — PLAN OF CARE
Patient cleared for discharge to [address] [place] [address]. Family updated on plan of care. IV and telemetry removed. Report called to [place]. Patient transported by [facility]. Problem: Patient Centered Care  Goal: Patient preferences are identified and integrated in the patient's plan of care  Description: Interventions:  - What would you like us to know as we care for you?  I live with my wife, and I want to go home!  - Provide timely, complete, and accurate information to patient/family  - Incorporate patient and family knowledge, values, beliefs, and cultural backgrounds into the planning and delivery of care  - Encourage patient/family to participate in care and decision-making at the level they choose  - Honor patient and family perspectives and choices  Outcome: Completed     Problem: Patient/Family Goals  Goal: Patient/Family Long Term Goal  Description: Patient's Long Term Goal: go home and not have this breathing issue again    Interventions:  - medications as ordered  - complete testing as ordered  - know whom to follow up with on DC home  - See additional Care Plan goals for specific interventions  Outcome: Completed  Goal: Patient/Family Short Term Goal  Description: Patient's Short Term Goal: breathe easier/better    Interventions:  - complete testing as ordered  - take medications as orderedInterventions:   - possible Echo this morning  - See additional Care Plan goals for specific interventions  Outcome: Completed     Problem: CARDIOVASCULAR - ADULT  Goal: Maintains optimal cardiac output and hemodynamic stability  Description: INTERVENTIONS:  - Monitor vital signs, rhythm, and trends  - Monitor for bleeding, hypotension and signs of decreased cardiac output  - Evaluate effectiveness of vasoactive medications to optimize hemodynamic stability  - Monitor arterial and/or venous puncture sites for bleeding and/or hematoma  - Assess quality of pulses, skin color and temperature  - Assess for signs of decreased coronary artery perfusion - ex.  Angina  - Evaluate fluid balance, assess for edema, trend weights  Outcome: Completed  Goal: Absence of cardiac arrhythmias or at baseline  Description: INTERVENTIONS:  - Continuous cardiac monitoring, monitor vital signs, obtain 12 lead EKG if indicated  - Evaluate effectiveness of antiarrhythmic and heart rate control medications as ordered  - Initiate emergency measures for life threatening arrhythmias  - Monitor electrolytes and administer replacement therapy as ordered  Outcome: Completed     Problem: RESPIRATORY - ADULT  Goal: Achieves optimal ventilation and oxygenation  Description: INTERVENTIONS:  - Assess for changes in respiratory status  - Assess for changes in mentation and behavior  - Position to facilitate oxygenation and minimize respiratory effort  - Oxygen supplementation based on oxygen saturation or ABGs  - Provide Smoking Cessation handout, if applicable  - Encourage broncho-pulmonary hygiene including cough, deep breathe, Incentive Spirometry  - Assess the need for suctioning and perform as needed  - Assess and instruct to report SOB or any respiratory difficulty  - Respiratory Therapy support as indicated  - Manage/alleviate anxiety  - Monitor for signs/symptoms of CO2 retention  Outcome: Completed     Problem: METABOLIC/FLUID AND ELECTROLYTES - ADULT  Goal: Electrolytes maintained within normal limits  Description: INTERVENTIONS:  - Monitor labs and rhythm and assess patient for signs and symptoms of electrolyte imbalances  - Administer electrolyte replacement as ordered  - Monitor response to electrolyte replacements, including rhythm and repeat lab results as appropriate  - Fluid restriction as ordered  - Instruct patient on fluid and nutrition restrictions as appropriate  Outcome: Completed

## 2022-09-17 NOTE — CM/SW NOTE
CM received MDO to check cost of Eliquis. RX sent to pts pharmacy, called [facility], pts OOP cost for Eliquis is $20.    Spoke with [facility] at [facility], pt can return at anytime today. Dr. Arnold Leal and RN updated on above vis secure chat. Darleen King.  Mayelin Tirado RN, BSN  Nurse   [phone]

## 2022-09-17 NOTE — SLP NOTE
SPEECH DAILY NOTE - INPATIENT    ASSESSMENT & PLAN   ASSESSMENT  Pt seen to monitor tolerance of PO diet, train compensatory strategies and assess candidacy for diet upgrade. Pt alert, afebrile and tolerating room air. Family at bedside. Pt scheduled to go to rehab this afternoon. Presented trials of current diet as well as hard solid and thin liquid to assess for upgrade. Mastication time of solid was minimally increased, but complete without oral residue. The pharyngeal response was mildly delayed with mildly reduced laryngeal excursion. No clinical signs with hard kassie or mildly thick liquids. Delayed cough intermittently with thin liquids. Recommend upgrade food to soft easy to chew, but continue mildly thick liquids. Recommend dysphagia therapy during rehab stay. Diet Recommendations - Solids: Soft/ Easy to chew  Diet Recommendations - Liquids: Nectar thick liquids/ Mildly thick    Compensatory Strategies Recommended: No straws; Alternate consistencies  Aspiration Precautions: Upright position; Slow rate;Small bites and sips; No straw  Medication Administration Recommendations: Crushed in puree    Patient Experiencing Pain: No                Discharge Recommendations  Discharge Recommendations/Plan: Undetermined    Treatment Plan  Treatment Plan/Recommendations: Aspiration precautions    Interdisciplinary Communication: Discussed with RN            GOALS  Goal #1 The patient will tolerate BITE SIZE consistency and MILDLY THICK liquids without overt signs or symptoms of aspiration with 100 % accuracy over 1-2 session(s). Pt tolerating current diet without clinical signs of aspiration. Recommend upgrade of solids to soft. New goal:  The patient will tolerate soft easy to chew consistency and MILDLY THICK liquids without overt signs or symptoms of aspiration with 100 % accuracy over 1-2 session(s).   upgraded 9/17/22   Goal #2 The patient will utilize compensatory strategies as outlined by BSSE (clinical evaluation) including Slow rate, Small bites, Small sips, Alternate liquids/solids, No straws, Upright 90 degrees with mild feeding  assistance 90 % of the time across 3-4 sessions. Pt followed all strategies with mild cues. In Progress   Goal #3 The patient will tolerate trial upgrade of SOFT EASY TO CHEW consistency and THIN liquids without overt signs or symptoms of aspiration with 100 % accuracy over 2-3 session(s). Partially met. Recommend upgrade to soft easy to chew diet. Liquids should remain mildly thick. In Progress, partially met.        FOLLOW UP  Follow Up Needed (Documentation Required): Yes  SLP Follow-up Date: 09/18/22  Number of Visits to Meet Established Goals: 4    Session: 1    If you have any questions, please contact Landen Garcia [address] MA/[facility]-SLP  Speech Language Pathologist  [address]

## 2022-09-20 ENCOUNTER — INITIAL APN SNF VISIT (OUTPATIENT)
Facility: SKILLED NURSING FACILITY | Age: 87
End: 2022-09-20

## 2022-09-20 DIAGNOSIS — E78.5 HYPERLIPIDEMIA, UNSPECIFIED HYPERLIPIDEMIA TYPE: ICD-10-CM

## 2022-09-20 DIAGNOSIS — G93.40 ENCEPHALOPATHY: ICD-10-CM

## 2022-09-20 DIAGNOSIS — I48.19 PERSISTENT ATRIAL FIBRILLATION (HCC): ICD-10-CM

## 2022-09-20 DIAGNOSIS — I10 PRIMARY HYPERTENSION: ICD-10-CM

## 2022-09-20 DIAGNOSIS — E03.8 OTHER SPECIFIED HYPOTHYROIDISM: ICD-10-CM

## 2022-09-20 DIAGNOSIS — J69.0 ASPIRATION PNEUMONIA, UNSPECIFIED ASPIRATION PNEUMONIA TYPE, UNSPECIFIED LATERALITY, UNSPECIFIED PART OF LUNG (HCC): ICD-10-CM

## 2022-09-20 DIAGNOSIS — I26.99 OTHER ACUTE PULMONARY EMBOLISM, UNSPECIFIED WHETHER ACUTE COR PULMONALE PRESENT (HCC): ICD-10-CM

## 2022-09-20 DIAGNOSIS — R53.1 WEAKNESS: ICD-10-CM

## 2022-09-20 DIAGNOSIS — I50.9 ACUTE ON CHRONIC CONGESTIVE HEART FAILURE, UNSPECIFIED HEART FAILURE TYPE (HCC): ICD-10-CM

## 2022-09-20 DIAGNOSIS — D51.9 ANEMIA DUE TO VITAMIN B12 DEFICIENCY, UNSPECIFIED B12 DEFICIENCY TYPE: ICD-10-CM

## 2022-09-20 PROCEDURE — 1111F DSCHRG MED/CURRENT MED MERGE: CPT | Performed by: NURSE PRACTITIONER

## 2022-09-20 PROCEDURE — 99310 SBSQ NF CARE HIGH MDM 45: CPT | Performed by: NURSE PRACTITIONER

## 2022-09-20 PROCEDURE — 1123F ACP DISCUSS/DSCN MKR DOCD: CPT | Performed by: NURSE PRACTITIONER

## 2022-09-23 ENCOUNTER — SNF DISCHARGE (OUTPATIENT)
Facility: SKILLED NURSING FACILITY | Age: 87
End: 2022-09-23

## 2022-09-23 DIAGNOSIS — I50.33 ACUTE ON CHRONIC DIASTOLIC CONGESTIVE HEART FAILURE (HCC): ICD-10-CM

## 2022-09-23 DIAGNOSIS — E78.2 MIXED HYPERLIPIDEMIA: ICD-10-CM

## 2022-09-23 DIAGNOSIS — I48.19 PERSISTENT ATRIAL FIBRILLATION (HCC): ICD-10-CM

## 2022-09-23 DIAGNOSIS — F03.91 DEMENTIA WITH BEHAVIORAL DISTURBANCE, UNSPECIFIED DEMENTIA TYPE: ICD-10-CM

## 2022-09-23 DIAGNOSIS — I26.99 OTHER ACUTE PULMONARY EMBOLISM WITHOUT ACUTE COR PULMONALE (HCC): ICD-10-CM

## 2022-09-23 DIAGNOSIS — J69.0 ASPIRATION PNEUMONIA, UNSPECIFIED ASPIRATION PNEUMONIA TYPE, UNSPECIFIED LATERALITY, UNSPECIFIED PART OF LUNG (HCC): ICD-10-CM

## 2022-09-23 DIAGNOSIS — I10 PRIMARY HYPERTENSION: ICD-10-CM

## 2022-09-23 PROCEDURE — 1111F DSCHRG MED/CURRENT MED MERGE: CPT | Performed by: NURSE PRACTITIONER

## 2022-09-23 PROCEDURE — 99316 NF DSCHRG MGMT 30 MIN+: CPT | Performed by: NURSE PRACTITIONER

## 2022-10-05 ENCOUNTER — TELEPHONE (OUTPATIENT)
Dept: INTERVENTIONAL RADIOLOGY/VASCULAR | Facility: HOSPITAL | Age: 87
End: 2022-10-05

## 2022-10-05 NOTE — TELEPHONE ENCOUNTER
6 month post Amulet call. Pt was discharged on DAPT. MId September, pt hospitalized w/ a small PE. Plavix stopped and Eliquis started.

## 2022-10-18 ENCOUNTER — LAB REQUISITION (OUTPATIENT)
Dept: LAB | Facility: HOSPITAL | Age: 87
End: 2022-10-18
Payer: MEDICARE

## 2022-10-18 DIAGNOSIS — I50.9 HEART FAILURE, UNSPECIFIED (HCC): ICD-10-CM

## 2022-10-18 LAB
ANION GAP SERPL CALC-SCNC: 4 MMOL/L (ref 0–18)
BUN BLD-MCNC: 27 MG/DL (ref 7–18)
CALCIUM BLD-MCNC: 8.5 MG/DL (ref 8.5–10.1)
CHLORIDE SERPL-SCNC: 108 MMOL/L (ref 98–112)
CO2 SERPL-SCNC: 26 MMOL/L (ref 21–32)
CREAT BLD-MCNC: 1.01 MG/DL
GFR SERPLBLD BASED ON 1.73 SQ M-ARVRAT: 72 ML/MIN/1.73M2 (ref 60–?)
GLUCOSE BLD-MCNC: 79 MG/DL (ref 70–99)
OSMOLALITY SERPL CALC.SUM OF ELEC: 290 MOSM/KG (ref 275–295)
POTASSIUM SERPL-SCNC: 4.8 MMOL/L (ref 3.5–5.1)
SODIUM SERPL-SCNC: 138 MMOL/L (ref 136–145)

## 2022-10-18 PROCEDURE — 80048 BASIC METABOLIC PNL TOTAL CA: CPT | Performed by: INTERNAL MEDICINE

## 2023-01-17 ENCOUNTER — OFFICE VISIT (OUTPATIENT)
Dept: OTOLARYNGOLOGY | Facility: CLINIC | Age: 88
End: 2023-01-17

## 2023-01-17 DIAGNOSIS — H61.23 BILATERAL IMPACTED CERUMEN: Primary | ICD-10-CM

## 2023-01-17 PROCEDURE — 69210 REMOVE IMPACTED EAR WAX UNI: CPT | Performed by: OTOLARYNGOLOGY

## 2023-03-06 ENCOUNTER — TELEPHONE (OUTPATIENT)
Dept: CARDIOLOGY CLINIC | Facility: HOSPITAL | Age: 88
End: 2023-03-06

## 2023-05-15 ENCOUNTER — ORDER TRANSCRIPTION (OUTPATIENT)
Dept: PHYSICAL THERAPY | Facility: HOSPITAL | Age: 88
End: 2023-05-15

## 2023-05-15 ENCOUNTER — TELEPHONE (OUTPATIENT)
Dept: PHYSICAL THERAPY | Facility: HOSPITAL | Age: 88
End: 2023-05-15

## 2023-05-15 DIAGNOSIS — R13.10 DYSPHAGIA: Primary | ICD-10-CM

## 2023-06-29 ENCOUNTER — APPOINTMENT (OUTPATIENT)
Dept: GENERAL RADIOLOGY | Facility: HOSPITAL | Age: 88
End: 2023-06-29
Attending: EMERGENCY MEDICINE
Payer: MEDICARE

## 2023-06-29 ENCOUNTER — HOSPITAL ENCOUNTER (EMERGENCY)
Facility: HOSPITAL | Age: 88
Discharge: HOME | End: 2023-06-29
Attending: EMERGENCY MEDICINE
Payer: MEDICARE

## 2023-06-29 VITALS
HEIGHT: 68 IN | HEART RATE: 69 BPM | WEIGHT: 155 LBS | RESPIRATION RATE: 20 BRPM | OXYGEN SATURATION: 94 % | TEMPERATURE: 97 F | DIASTOLIC BLOOD PRESSURE: 78 MMHG | SYSTOLIC BLOOD PRESSURE: 115 MMHG | BODY MASS INDEX: 23.49 KG/M2

## 2023-06-29 DIAGNOSIS — I50.23 ACUTE ON CHRONIC SYSTOLIC CONGESTIVE HEART FAILURE (HCC): Primary | ICD-10-CM

## 2023-06-29 LAB
ALBUMIN SERPL-MCNC: 3 G/DL (ref 3.4–5)
ALBUMIN/GLOB SERPL: 0.9 {RATIO} (ref 1–2)
ALP LIVER SERPL-CCNC: 76 U/L
ALT SERPL-CCNC: 13 U/L
ANION GAP SERPL CALC-SCNC: 3 MMOL/L (ref 0–18)
AST SERPL-CCNC: 16 U/L (ref 15–37)
BASOPHILS # BLD AUTO: 0.02 X10(3) UL (ref 0–0.2)
BASOPHILS NFR BLD AUTO: 0.4 %
BILIRUB SERPL-MCNC: 0.6 MG/DL (ref 0.1–2)
BUN BLD-MCNC: 27 MG/DL (ref 7–18)
BUN/CREAT SERPL: 18 (ref 10–20)
CALCIUM BLD-MCNC: 8.1 MG/DL (ref 8.5–10.1)
CHLORIDE SERPL-SCNC: 115 MMOL/L (ref 98–112)
CO2 SERPL-SCNC: 25 MMOL/L (ref 21–32)
CREAT BLD-MCNC: 1.5 MG/DL
DEPRECATED RDW RBC AUTO: 61.1 FL (ref 35.1–46.3)
EOSINOPHIL # BLD AUTO: 0.21 X10(3) UL (ref 0–0.7)
EOSINOPHIL NFR BLD AUTO: 4.1 %
ERYTHROCYTE [DISTWIDTH] IN BLOOD BY AUTOMATED COUNT: 16.8 % (ref 11–15)
GFR SERPLBLD BASED ON 1.73 SQ M-ARVRAT: 45 ML/MIN/1.73M2 (ref 60–?)
GLOBULIN PLAS-MCNC: 3.3 G/DL (ref 2.8–4.4)
GLUCOSE BLD-MCNC: 106 MG/DL (ref 70–99)
HCT VFR BLD AUTO: 32.8 %
HGB BLD-MCNC: 10.6 G/DL
IMM GRANULOCYTES # BLD AUTO: 0.01 X10(3) UL (ref 0–1)
IMM GRANULOCYTES NFR BLD: 0.2 %
LYMPHOCYTES # BLD AUTO: 0.95 X10(3) UL (ref 1–4)
LYMPHOCYTES NFR BLD AUTO: 18.6 %
MCH RBC QN AUTO: 31.7 PG (ref 26–34)
MCHC RBC AUTO-ENTMCNC: 32.3 G/DL (ref 31–37)
MCV RBC AUTO: 98.2 FL
MONOCYTES # BLD AUTO: 0.69 X10(3) UL (ref 0.1–1)
MONOCYTES NFR BLD AUTO: 13.5 %
NEUTROPHILS # BLD AUTO: 3.24 X10 (3) UL (ref 1.5–7.7)
NEUTROPHILS # BLD AUTO: 3.24 X10(3) UL (ref 1.5–7.7)
NEUTROPHILS NFR BLD AUTO: 63.2 %
NT-PROBNP SERPL-MCNC: 5749 PG/ML (ref ?–450)
OSMOLALITY SERPL CALC.SUM OF ELEC: 302 MOSM/KG (ref 275–295)
PLATELET # BLD AUTO: 142 10(3)UL (ref 150–450)
POTASSIUM SERPL-SCNC: 4.2 MMOL/L (ref 3.5–5.1)
PROT SERPL-MCNC: 6.3 G/DL (ref 6.4–8.2)
RBC # BLD AUTO: 3.34 X10(6)UL
SODIUM SERPL-SCNC: 143 MMOL/L (ref 136–145)
TROPONIN I HIGH SENSITIVITY: 31 NG/L
WBC # BLD AUTO: 5.1 X10(3) UL (ref 4–11)

## 2023-06-29 PROCEDURE — 99285 EMERGENCY DEPT VISIT HI MDM: CPT

## 2023-06-29 PROCEDURE — 84484 ASSAY OF TROPONIN QUANT: CPT | Performed by: EMERGENCY MEDICINE

## 2023-06-29 PROCEDURE — 36415 COLL VENOUS BLD VENIPUNCTURE: CPT

## 2023-06-29 PROCEDURE — 85025 COMPLETE CBC W/AUTO DIFF WBC: CPT | Performed by: EMERGENCY MEDICINE

## 2023-06-29 PROCEDURE — 93010 ELECTROCARDIOGRAM REPORT: CPT

## 2023-06-29 PROCEDURE — 93005 ELECTROCARDIOGRAM TRACING: CPT

## 2023-06-29 PROCEDURE — 71045 X-RAY EXAM CHEST 1 VIEW: CPT | Performed by: EMERGENCY MEDICINE

## 2023-06-29 PROCEDURE — 80053 COMPREHEN METABOLIC PANEL: CPT | Performed by: EMERGENCY MEDICINE

## 2023-06-29 PROCEDURE — 99284 EMERGENCY DEPT VISIT MOD MDM: CPT

## 2023-06-29 PROCEDURE — 83880 ASSAY OF NATRIURETIC PEPTIDE: CPT | Performed by: EMERGENCY MEDICINE

## 2023-06-30 LAB
ATRIAL RATE: 300 BPM
Q-T INTERVAL: 488 MS
QRS DURATION: 172 MS
QTC CALCULATION (BEZET): 549 MS
R AXIS: -84 DEGREES
T AXIS: 58 DEGREES
VENTRICULAR RATE: 76 BPM

## 2023-06-30 NOTE — DISCHARGE INSTRUCTIONS
Please increase Lasix dosing to 40 mg every day. Continue this for the next 2 weeks and then decrease dosing to 20 mg every other day.

## 2023-07-18 ENCOUNTER — OFFICE VISIT (OUTPATIENT)
Dept: OTOLARYNGOLOGY | Facility: CLINIC | Age: 88
End: 2023-07-18

## 2023-07-18 DIAGNOSIS — H61.23 BILATERAL IMPACTED CERUMEN: Primary | ICD-10-CM

## 2023-07-18 PROCEDURE — 69210 REMOVE IMPACTED EAR WAX UNI: CPT | Performed by: OTOLARYNGOLOGY

## 2023-07-18 RX ORDER — LEVOTHYROXINE SODIUM 112 UG/1
112 TABLET ORAL DAILY
COMMUNITY
Start: 2023-03-05

## 2023-07-18 NOTE — PROGRESS NOTES
Emily Bethea is a 80year old male. Patient presents with:  Ear Wax: Ear cleaning       HISTORY OF PRESENT ILLNESS    Patient presents for cerumen removal. No other complaints or concerns at this time    Social History    Socioeconomic History      Marital status:     Tobacco Use      Smoking status: Former        Years: 10.00        Types: Cigarettes      Smokeless tobacco: Never    Vaping Use      Vaping Use: Never used    Substance and Sexual Activity      Alcohol use:  Yes        Alcohol/week: 0.0 - 1.0 standard drinks of alcohol        Comment: 2 beers a month      Drug use: No    Other Topics      Concerns:        Caffeine Concern: No      Family History   Problem Relation Age of Onset    Hypertension Father     Diabetes Mother     Obesity Mother     Heart Disorder Mother         MI    Heart Disease Mother         CAD    Cancer Brother         Brain cancer       Past Medical History:   Diagnosis Date    Anemia 04/20/2011    Negative scopes    Aortic dilatation ([address]     thoracic aorta - 4.6 cm -repeat CT chest in 8/2019    Arrhythmia     AFib-pacemaker July 2016 [facility]    ATRIAL FIBRILLATION     s/p cardioversion multiple times - now in [facility] - Dr. Nichole Trammell, and Dr. Ivar Hatchet    Bilateral renal cysts     simple    Cataract     Clostridium difficile diarrhea     7/2020    Compression fracture     T8, T12    Disorder of thyroid     Essential hypertension     H/O cardiovascular stress test     NM stress testing - unremarkable (9/2015)    Hearing impairment     bilateral hearing aides    High blood pressure     High cholesterol     History of colonic polyps     Repeat Colonscopy PRN    HLD (hyperlipidemia) 04/20/2011    HTN (hypertension)     HYPOTHYROIDISM     since 2008    IFG (impaired fasting glucose)     Osteoarthritis     Osteopenia     but with compression fractures    Pacemaker     Prostate cancer [facility])     s/p prostatectomy -2003 - Urologist - Dr. Marcial Mayer    Pulmonary nodule     1.6 cm - stable x 3 years -no further f/up needed    Pulmonary nodule     new pulm nodule- repeat CT chest in 7/2018    Sacral fracture (HCC)     Shingles     Thrombocytopenia ([address] 04/20/2011    Visual impairment     glasses       Past Surgical History:   Procedure Laterality Date    ABLATION      a-fib ablation -> 2008    BACK SURGERY      kyphoplasty - 7/72012    CARDIAC PACEMAKER PLACEMENT      July 2016 last checked 4/2017    COLONOSCOPY N/A 9/20/2017    Procedure: COLONOSCOPY, POSSIBLE BIOPSY, POSSIBLE POLYPECTOMY 48892;  Surgeon: Basim Shore MD;  Location: [place] [address]  9/12/2011    Performed by Gareth MANUEL at [place] [address]  10/17/2012    Procedure: COLONOSCOPY, POSSIBLE BIOPSY, POSSIBLE POLYPECTOMY 17298;  Surgeon: Basim Shore MD;  Location: [place] [address]  10/17/2012    Procedure: COLONOSCOPY, POSSIBLE BIOPSY, POSSIBLE POLYPECTOMY 55636;  Surgeon: Basim Shore MD;  Location: [address] [place] [address] - 2003    [facility] KYPHOPLASTY LUMBAR      HERNIA SURGERY  4/21/17    HIP REPLACEMENT SURGERY      L hip replacement - 1994    HIP REPLACEMENT SURGERY Left 2011    LAPAROSCOPY, SURGICAL PROSTATECTOMY, RETROPUBIC RADICAL, W/NERVE SPARING      OTHER  04/19/2018    pacer left lead revision    OTHER SURGICAL HISTORY      cardioversion multiple    OTHER SURGICAL HISTORY      HH 4/20 - L total hip replacement    OTHER SURGICAL HISTORY      s/p cardiac ablation/and pacemaker 7/20/2016    PACEMAKER MONITOR  07/2016    PATIENT DOCUMENTED NOT TO HAVE EXPERIENCED ANY OF THE FOLLOWING EVENTS  10/17/2012    Procedure: COLONOSCOPY, POSSIBLE BIOPSY, POSSIBLE POLYPECTOMY 80464;  Surgeon: Basim Shore MD;  Location: [address] [place] [address]    PATIENT [place] INFECTION PROPHYLAXIS.   10/17/2012    Procedure: COLONOSCOPY, POSSIBLE BIOPSY, POSSIBLE POLYPECTOMY 21855;  Surgeon: Saray Britton MD;  Location: [address] [place] [address],[address]      left hip    UPPER GI ENDOSCOPY,BIOPSY  9/12/2011    Performed by LUDA MANUEL at [facility] Neg/Pos Details   Constitutional Negative Fatigue, fever and weight loss. ENMT Negative Drooling. Eyes Negative Blurred vision and vision changes. Respiratory Negative Dyspnea and wheezing. Cardio Negative Chest pain, irregular heartbeat/palpitations and syncope. GI Negative Abdominal pain and diarrhea. Endocrine Negative Cold intolerance and heat intolerance. Neuro Negative Tremors. Psych Negative Anxiety and depression. Integumentary Negative Frequent skin infections, pigment change and rash. Hema/Lymph Negative Easy bleeding and easy bruising. PHYSICAL EXAM    There were no vitals taken for this visit. Constitutional Normal Overall appearance - Normal.        Neck Exam Normal Inspection - Normal. Palpation - Normal. Parotid gland - Normal. Thyroid gland - Normal.             Head/Face Normal Facial features - Normal. Eyebrows - Normal. Skull - Normal.             Ears Normal Inspection - Right: Normal, Left: Normal. Canal - Right: Normal, Left: Normal. TM - Right: Normal, Left: Normal.   Skin Normal Inspection - Normal.                              Canals:  Right: Canal reveals cerumen impaction,   Left: Canal reveals cerumen impaction,     Tympanic Membranes:  Right: Normal tympanic membrane. Left: Normal tympanic membrane. TM Visualized Method:   Right TM examined via otomicroscopy. Left TM examined via otomicroscopy. PROCEDURE:    Removal of cerumen impaction   The cerumen impaction was completely removed using microscopy. Removal was completed by using acurette and/or suction. Comments: Return to clinic as needed.   Avoid q-tips, water precautions and use over the counter wax remedies as needed. Current Outpatient Medications:     levothyroxine 112 MCG Oral Tab, Take 1 tablet (112 mcg total) by mouth daily. , Disp: , Rfl:     docusate sodium 100 MG Oral Cap, Take 100 mg by mouth 2 (two) times daily. , Disp: , Rfl: 0    apixaban 5 MG Oral Tab, Take 2 tabs (10mg) by mouth twice daily for 7 days, then take 1 tab (5mg) by mouth twice daily thereafter., Disp: 74 tablet, Rfl: 0    QUEtiapine 25 MG Oral Tab, Take 1 tablet (25 mg total) by mouth nightly as needed (agitation). , Disp: 10 tablet, Rfl: 0    ferrous sulfate 325 (65 FE) MG Oral Tab EC, Take 1 tablet (325 mg total) by mouth 2 (two) times daily with meals. , Disp: , Rfl:     Denosumab 60 MG/ML Subcutaneous Solution Prefilled Syringe, Inject 1 mL (60 mg total) into the skin one time. , Disp: , Rfl:     fluorouracil 5 % External Cream, , Disp: , Rfl:     furosemide 20 MG Oral Tab, Take 1 tablet (20 mg total) by mouth daily. , Disp: 30 tablet, Rfl: 0    clopidogrel 75 MG Oral Tab, Take 1 tablet (75 mg total) by mouth daily. , Disp: 90 tablet, Rfl: 1    ATORVASTATIN 10 MG Oral Tab, TAKE 1 TABLET BY MOUTH NIGHTLY, Disp: 90 tablet, Rfl: 3    lisinopril 10 MG Oral Tab, Take 1 tablet (10 mg total) by mouth daily. , Disp: 90 tablet, Rfl: 3    Calcium Carbonate 500 (200 Ca) MG Oral Wafer, Take 500 mg by mouth daily. (Patient taking differently: Take 600 mg by mouth daily.), Disp: 90 Wafer, Rfl: 1    Vitamin B-12 1000 MCG Oral Tab, Take 1 tablet (1,000 mcg total) by mouth daily. , Disp: 90 tablet, Rfl: 3    Cholecalciferol (VITAMIN D) 1000 UNITS Oral Tab, Take 2,000 Units by mouth., Disp: , Rfl:     Calcium Carb-Cholecalciferol (CALCIUM-VITAMIN D3) 600-400 MG-UNIT Oral Cap, Take  by mouth daily. , Disp: , Rfl:   ASSESSMENT AND PLAN    1. Bilateral impacted cerumen    - REMOVAL IMPACTED CERUMEN REQUIRING INSTRUMENTATION, UNILATERAL      All cerumen was removed using microscopy.  I have asked the patient to return to see me as needed for repeat cerumen removal in the future. Kareem Ang.  Dima Choi MD    7/18/2023    10:47 AM

## 2023-12-20 ENCOUNTER — HOSPITAL ENCOUNTER (OUTPATIENT)
Facility: HOSPITAL | Age: 88
Setting detail: OBSERVATION
Discharge: HOME | End: 2023-12-22
Attending: EMERGENCY MEDICINE | Admitting: INTERNAL MEDICINE
Payer: MEDICARE

## 2023-12-20 ENCOUNTER — APPOINTMENT (OUTPATIENT)
Dept: CT IMAGING | Facility: HOSPITAL | Age: 88
End: 2023-12-20
Attending: EMERGENCY MEDICINE
Payer: MEDICARE

## 2023-12-20 ENCOUNTER — APPOINTMENT (OUTPATIENT)
Dept: GENERAL RADIOLOGY | Facility: HOSPITAL | Age: 88
End: 2023-12-20
Attending: EMERGENCY MEDICINE
Payer: MEDICARE

## 2023-12-20 DIAGNOSIS — I50.33 ACUTE ON CHRONIC DIASTOLIC HEART FAILURE (HCC): Primary | ICD-10-CM

## 2023-12-20 LAB
ANION GAP SERPL CALC-SCNC: 5 MMOL/L (ref 0–18)
BASOPHILS # BLD AUTO: 0.04 X10(3) UL (ref 0–0.2)
BASOPHILS NFR BLD AUTO: 0.7 %
BNP SERPL-MCNC: 270 PG/ML
BUN BLD-MCNC: 28 MG/DL (ref 9–23)
BUN/CREAT SERPL: 19.4 (ref 10–20)
CALCIUM BLD-MCNC: 8 MG/DL (ref 8.7–10.4)
CHLORIDE SERPL-SCNC: 110 MMOL/L (ref 98–112)
CO2 SERPL-SCNC: 25 MMOL/L (ref 21–32)
CREAT BLD-MCNC: 1.44 MG/DL
DEPRECATED RDW RBC AUTO: 53.6 FL (ref 35.1–46.3)
EGFRCR SERPLBLD CKD-EPI 2021: 46 ML/MIN/1.73M2 (ref 60–?)
EOSINOPHIL # BLD AUTO: 0.37 X10(3) UL (ref 0–0.7)
EOSINOPHIL NFR BLD AUTO: 6.7 %
ERYTHROCYTE [DISTWIDTH] IN BLOOD BY AUTOMATED COUNT: 14.7 % (ref 11–15)
FLUAV + FLUBV RNA SPEC NAA+PROBE: NEGATIVE
FLUAV + FLUBV RNA SPEC NAA+PROBE: NEGATIVE
GLUCOSE BLD-MCNC: 109 MG/DL (ref 70–99)
HCT VFR BLD AUTO: 35.9 %
HGB BLD-MCNC: 11.3 G/DL
IMM GRANULOCYTES # BLD AUTO: 0.01 X10(3) UL (ref 0–1)
IMM GRANULOCYTES NFR BLD: 0.2 %
LYMPHOCYTES # BLD AUTO: 1.01 X10(3) UL (ref 1–4)
LYMPHOCYTES NFR BLD AUTO: 18.4 %
MCH RBC QN AUTO: 30.8 PG (ref 26–34)
MCHC RBC AUTO-ENTMCNC: 31.5 G/DL (ref 31–37)
MCV RBC AUTO: 97.8 FL
MONOCYTES # BLD AUTO: 0.61 X10(3) UL (ref 0.1–1)
MONOCYTES NFR BLD AUTO: 11.1 %
NEUTROPHILS # BLD AUTO: 3.46 X10 (3) UL (ref 1.5–7.7)
NEUTROPHILS # BLD AUTO: 3.46 X10(3) UL (ref 1.5–7.7)
NEUTROPHILS NFR BLD AUTO: 62.9 %
OSMOLALITY SERPL CALC.SUM OF ELEC: 296 MOSM/KG (ref 275–295)
PLATELET # BLD AUTO: 182 10(3)UL (ref 150–450)
POTASSIUM SERPL-SCNC: 5 MMOL/L (ref 3.5–5.1)
RBC # BLD AUTO: 3.67 X10(6)UL
RSV RNA SPEC NAA+PROBE: NEGATIVE
SARS-COV-2 RNA RESP QL NAA+PROBE: NOT DETECTED
SODIUM SERPL-SCNC: 140 MMOL/L (ref 136–145)
TROPONIN I SERPL HS-MCNC: 29 NG/L
WBC # BLD AUTO: 5.5 X10(3) UL (ref 4–11)

## 2023-12-20 PROCEDURE — 71045 X-RAY EXAM CHEST 1 VIEW: CPT | Performed by: EMERGENCY MEDICINE

## 2023-12-20 PROCEDURE — 84484 ASSAY OF TROPONIN QUANT: CPT | Performed by: EMERGENCY MEDICINE

## 2023-12-20 PROCEDURE — 83880 ASSAY OF NATRIURETIC PEPTIDE: CPT | Performed by: EMERGENCY MEDICINE

## 2023-12-20 PROCEDURE — 99285 EMERGENCY DEPT VISIT HI MDM: CPT

## 2023-12-20 PROCEDURE — 71260 CT THORAX DX C+: CPT | Performed by: EMERGENCY MEDICINE

## 2023-12-20 PROCEDURE — 85025 COMPLETE CBC W/AUTO DIFF WBC: CPT | Performed by: EMERGENCY MEDICINE

## 2023-12-20 PROCEDURE — 0241U SARS-COV-2/FLU A AND B/RSV BY PCR (GENEXPERT): CPT | Performed by: EMERGENCY MEDICINE

## 2023-12-20 PROCEDURE — 80048 BASIC METABOLIC PNL TOTAL CA: CPT | Performed by: EMERGENCY MEDICINE

## 2023-12-20 PROCEDURE — 96374 THER/PROPH/DIAG INJ IV PUSH: CPT

## 2023-12-20 PROCEDURE — 93005 ELECTROCARDIOGRAM TRACING: CPT

## 2023-12-20 PROCEDURE — 93010 ELECTROCARDIOGRAM REPORT: CPT

## 2023-12-20 PROCEDURE — 94640 AIRWAY INHALATION TREATMENT: CPT

## 2023-12-20 RX ORDER — MIRTAZAPINE 7.5 MG/1
22.5 TABLET, FILM COATED ORAL NIGHTLY
COMMUNITY

## 2023-12-20 RX ORDER — FUROSEMIDE 10 MG/ML
40 INJECTION INTRAMUSCULAR; INTRAVENOUS ONCE
Status: COMPLETED | OUTPATIENT
Start: 2023-12-20 | End: 2023-12-20

## 2023-12-20 RX ORDER — ACETAMINOPHEN 500 MG
500 TABLET ORAL EVERY 4 HOURS PRN
Status: DISCONTINUED | OUTPATIENT
Start: 2023-12-20 | End: 2023-12-22

## 2023-12-20 RX ORDER — IPRATROPIUM BROMIDE AND ALBUTEROL SULFATE 2.5; .5 MG/3ML; MG/3ML
3 SOLUTION RESPIRATORY (INHALATION) ONCE
Status: COMPLETED | OUTPATIENT
Start: 2023-12-20 | End: 2023-12-20

## 2023-12-20 RX ORDER — HEPARIN SODIUM 5000 [USP'U]/ML
5000 INJECTION, SOLUTION INTRAVENOUS; SUBCUTANEOUS EVERY 8 HOURS SCHEDULED
Status: DISCONTINUED | OUTPATIENT
Start: 2023-12-20 | End: 2023-12-20

## 2023-12-20 RX ORDER — ASPIRIN 81 MG/1
81 TABLET ORAL DAILY
COMMUNITY

## 2023-12-21 ENCOUNTER — APPOINTMENT (OUTPATIENT)
Dept: GENERAL RADIOLOGY | Facility: HOSPITAL | Age: 88
End: 2023-12-21
Attending: INTERNAL MEDICINE
Payer: MEDICARE

## 2023-12-21 LAB
ANION GAP SERPL CALC-SCNC: 12 MMOL/L (ref 0–18)
ATRIAL RATE: 70 BPM
BASOPHILS # BLD AUTO: 0.03 X10(3) UL (ref 0–0.2)
BASOPHILS NFR BLD AUTO: 0.5 %
BUN BLD-MCNC: 27 MG/DL (ref 9–23)
BUN/CREAT SERPL: 21.1 (ref 10–20)
CALCIUM BLD-MCNC: 8 MG/DL (ref 8.7–10.4)
CHLORIDE SERPL-SCNC: 106 MMOL/L (ref 98–112)
CO2 SERPL-SCNC: 24 MMOL/L (ref 21–32)
CREAT BLD-MCNC: 1.28 MG/DL
DEPRECATED RDW RBC AUTO: 51.5 FL (ref 35.1–46.3)
EGFRCR SERPLBLD CKD-EPI 2021: 53 ML/MIN/1.73M2 (ref 60–?)
EOSINOPHIL # BLD AUTO: 0.38 X10(3) UL (ref 0–0.7)
EOSINOPHIL NFR BLD AUTO: 6.8 %
ERYTHROCYTE [DISTWIDTH] IN BLOOD BY AUTOMATED COUNT: 14.6 % (ref 11–15)
GLUCOSE BLD-MCNC: 89 MG/DL (ref 70–99)
HCT VFR BLD AUTO: 34.2 %
HGB BLD-MCNC: 11 G/DL
IMM GRANULOCYTES # BLD AUTO: 0.02 X10(3) UL (ref 0–1)
IMM GRANULOCYTES NFR BLD: 0.4 %
LYMPHOCYTES # BLD AUTO: 0.99 X10(3) UL (ref 1–4)
LYMPHOCYTES NFR BLD AUTO: 17.6 %
MAGNESIUM SERPL-MCNC: 2.3 MG/DL (ref 1.6–2.6)
MCH RBC QN AUTO: 30.9 PG (ref 26–34)
MCHC RBC AUTO-ENTMCNC: 32.2 G/DL (ref 31–37)
MCV RBC AUTO: 96.1 FL
MONOCYTES # BLD AUTO: 0.69 X10(3) UL (ref 0.1–1)
MONOCYTES NFR BLD AUTO: 12.3 %
NEUTROPHILS # BLD AUTO: 3.5 X10 (3) UL (ref 1.5–7.7)
NEUTROPHILS # BLD AUTO: 3.5 X10(3) UL (ref 1.5–7.7)
NEUTROPHILS NFR BLD AUTO: 62.4 %
OSMOLALITY SERPL CALC.SUM OF ELEC: 299 MOSM/KG (ref 275–295)
PLATELET # BLD AUTO: 178 10(3)UL (ref 150–450)
POTASSIUM SERPL-SCNC: 4.7 MMOL/L (ref 3.5–5.1)
Q-T INTERVAL: 438 MS
QRS DURATION: 166 MS
QTC CALCULATION (BEZET): 473 MS
R AXIS: -86 DEGREES
RBC # BLD AUTO: 3.56 X10(6)UL
SODIUM SERPL-SCNC: 142 MMOL/L (ref 136–145)
T AXIS: 61 DEGREES
VENTRICULAR RATE: 70 BPM
WBC # BLD AUTO: 5.6 X10(3) UL (ref 4–11)

## 2023-12-21 PROCEDURE — 83735 ASSAY OF MAGNESIUM: CPT | Performed by: INTERNAL MEDICINE

## 2023-12-21 PROCEDURE — 85025 COMPLETE CBC W/AUTO DIFF WBC: CPT | Performed by: INTERNAL MEDICINE

## 2023-12-21 PROCEDURE — 92611 MOTION FLUOROSCOPY/SWALLOW: CPT

## 2023-12-21 PROCEDURE — 92526 ORAL FUNCTION THERAPY: CPT

## 2023-12-21 PROCEDURE — 92610 EVALUATE SWALLOWING FUNCTION: CPT

## 2023-12-21 PROCEDURE — 74230 X-RAY XM SWLNG FUNCJ C+: CPT | Performed by: INTERNAL MEDICINE

## 2023-12-21 PROCEDURE — 80048 BASIC METABOLIC PNL TOTAL CA: CPT | Performed by: INTERNAL MEDICINE

## 2023-12-21 RX ORDER — ASPIRIN 81 MG/1
81 TABLET ORAL DAILY
Status: DISCONTINUED | OUTPATIENT
Start: 2023-12-21 | End: 2023-12-22

## 2023-12-21 RX ORDER — PANTOPRAZOLE SODIUM 40 MG/1
40 TABLET, DELAYED RELEASE ORAL
Status: DISCONTINUED | OUTPATIENT
Start: 2023-12-21 | End: 2023-12-22

## 2023-12-21 RX ORDER — HEPARIN SODIUM 5000 [USP'U]/ML
5000 INJECTION, SOLUTION INTRAVENOUS; SUBCUTANEOUS EVERY 8 HOURS SCHEDULED
Status: DISCONTINUED | OUTPATIENT
Start: 2023-12-21 | End: 2023-12-22

## 2023-12-21 RX ORDER — FUROSEMIDE 40 MG/1
40 TABLET ORAL DAILY
Status: DISCONTINUED | OUTPATIENT
Start: 2023-12-22 | End: 2023-12-22

## 2023-12-21 RX ORDER — ASPIRIN 81 MG/1
81 TABLET ORAL DAILY
Status: DISCONTINUED | OUTPATIENT
Start: 2023-12-21 | End: 2023-12-21

## 2023-12-21 RX ORDER — MIRTAZAPINE 7.5 MG/1
22.5 TABLET, FILM COATED ORAL NIGHTLY
Status: DISCONTINUED | OUTPATIENT
Start: 2023-12-21 | End: 2023-12-22

## 2023-12-21 RX ORDER — PSEUDOEPHEDRINE HCL 30 MG
100 TABLET ORAL 2 TIMES DAILY
Status: DISCONTINUED | OUTPATIENT
Start: 2023-12-21 | End: 2023-12-21

## 2023-12-21 RX ORDER — MELATONIN
325 2 TIMES DAILY WITH MEALS
Status: DISCONTINUED | OUTPATIENT
Start: 2023-12-21 | End: 2023-12-22

## 2023-12-21 RX ORDER — LEVOTHYROXINE SODIUM 112 UG/1
112 TABLET ORAL
Status: DISCONTINUED | OUTPATIENT
Start: 2023-12-21 | End: 2023-12-22

## 2023-12-21 RX ORDER — TRIAMCINOLONE ACETONIDE 1 MG/G
CREAM TOPICAL 2 TIMES DAILY
COMMUNITY

## 2023-12-21 RX ORDER — MELATONIN
325 2 TIMES DAILY WITH MEALS
Status: DISCONTINUED | OUTPATIENT
Start: 2023-12-21 | End: 2023-12-21

## 2023-12-21 RX ORDER — FUROSEMIDE 40 MG/1
40 TABLET ORAL DAILY
Status: DISCONTINUED | OUTPATIENT
Start: 2023-12-21 | End: 2023-12-21

## 2023-12-21 RX ORDER — PANTOPRAZOLE SODIUM 40 MG/1
40 TABLET, DELAYED RELEASE ORAL
COMMUNITY

## 2023-12-21 RX ORDER — MULTIVIT-MIN/IRON/FOLIC ACID/K 18-600-40
1 CAPSULE ORAL DAILY
COMMUNITY

## 2023-12-21 RX ORDER — DOCUSATE SODIUM 100 MG/1
100 CAPSULE, LIQUID FILLED ORAL 2 TIMES DAILY
Status: DISCONTINUED | OUTPATIENT
Start: 2023-12-21 | End: 2023-12-21

## 2023-12-21 NOTE — PLAN OF CARE
Pt was NPO this morning, had a VSS this morning and is to be on mildly thick liquids with soft/bite sized foods. Pt to work with PT/OT tomorrow. Likely discharge home tomorrow. Problem: Patient Centered Care  Goal: Patient preferences are identified and integrated in the patient's plan of care  Description: Interventions:  - What would you like us to know as we care for you? I live at home with my wife and caregiver  - Provide timely, complete, and accurate information to patient/family  - Incorporate patient and family knowledge, values, beliefs, and cultural backgrounds into the planning and delivery of care  - Encourage patient/family to participate in care and decision-making at the level they choose  - Honor patient and family perspectives and choices  Outcome: Progressing     Problem: Patient/Family Goals  Goal: Patient/Family Long Term Goal  Description: Patient's Long Term Goal: To stay out of the hospital    Interventions:  - Medical management, follow up care  - See additional Care Plan goals for specific interventions  Outcome: Progressing  Goal: Patient/Family Short Term Goal  Description: Patient's Short Term Goal: To feel better    Interventions:   - Hospital admission  - See additional Care Plan goals for specific interventions  Outcome: Progressing     Problem: SAFETY ADULT - FALL  Goal: Free from fall injury  Description: INTERVENTIONS:  - Assess pt frequently for physical needs  - Identify cognitive and physical deficits and behaviors that affect risk of falls.   - [place] fall precautions as indicated by assessment.  - Educate pt/family on patient safety including physical limitations  - Instruct pt to call for assistance with activity based on assessment  - Modify environment to reduce risk of injury  - Provide assistive devices as appropriate  - Consider OT/PT consult to assist with strengthening/mobility  - Encourage toileting schedule  Outcome: Progressing     Problem: CARDIOVASCULAR - ADULT  Goal: Maintains optimal cardiac output and hemodynamic stability  Description: INTERVENTIONS:  - Monitor vital signs, rhythm, and trends  - Monitor for bleeding, hypotension and signs of decreased cardiac output  - Evaluate effectiveness of vasoactive medications to optimize hemodynamic stability  - Monitor arterial and/or venous puncture sites for bleeding and/or hematoma  - Assess quality of pulses, skin color and temperature  - Assess for signs of decreased coronary artery perfusion - ex.  Angina  - Evaluate fluid balance, assess for edema, trend weights  Outcome: Progressing  Goal: Absence of cardiac arrhythmias or at baseline  Description: INTERVENTIONS:  - Continuous cardiac monitoring, monitor vital signs, obtain 12 lead EKG if indicated  - Evaluate effectiveness of antiarrhythmic and heart rate control medications as ordered  - Initiate emergency measures for life threatening arrhythmias  - Monitor electrolytes and administer replacement therapy as ordered  Outcome: Progressing

## 2023-12-21 NOTE — SLP NOTE
ADULT SWALLOWING EVALUATION    ASSESSMENT    ASSESSMENT/OVERALL IMPRESSION:  PPE REQUIRED. THIS THERAPIST WORE GLOVES, DROPLET MASK, AND GOGGLES FOR DURATION OF EVALUATION. HANDS WASHED UPON ENTRANCE/EXIT. SLP BSSE orders received and acknowledged. A swallow evaluation warranted as pt coughing/wheezing while eating/drinking. Outpatient VFSS scheduled for January 2024. Pt afebrile with weak vocal quality, on room air, with oxygen saturation at 95. Pt with prior hx of dysphagia at [address] [place] [address] in which last recommendations were easy to chew/mildly thick liquids per tx in 9/2022. Pt positioned upright in bed with wife and caregiver at bedside. Pt with no complaints of pain, RN aware. Oral [facility] exam completed and overall reduced strength, rate of motion, and ROM observed. Pt with adequate oral acceptance and bilabial seal across all trials. Pt with intact bite, prolonged mastication of solids, and increased NIRALI. Pt's swallow response appears delayed with reduced hyolaryngeal elevation/excursion. OVERT signs/symptoms of aspiration observed with all consistencies as evidenced by immediate coughing/throat clearing, wheezing, and shortness of breath. Trials discontinued. Oral/buccal cavities clear of residue following all trials. Oxygen status remained >92 t/o the entire evaluation. Recommend further objective assessment via VFSS to assess oropharyngeal function, r/o silent aspiration, and determine the safest and least restrictive diet consistencies. Pt educated and informed of the risk/benefits of VFSS assessment. Patient v/u and gave verbal consent for objective evaluation. At this time, pt presents with mild oral dysphagia and probable pharyngeal dysfunction. Recommend NPO prior to VFSS. Results and recommendations reviewed with RN, pt, and family. Pt/pt's family v/u to all results/recommendations.     PLAN: VFSS to be completed to rule out aspiration and determine safest/least restrictive diet and/or further dysphagia goals.   RECOMMENDATIONS   Diet Recommendations - Solids: NPO  Diet Recommendations - Liquids: NPO    Compensatory Strategies Recommended:  (n/a)  Aspiration Precautions:  (n/a)  Medication Administration Recommendations: Non-oral  Treatment Plan/Recommendations: Videofluoroscopic swallow study  Discharge Recommendations/Plan: Undetermined    HISTORY   MEDICAL HISTORY  Reason for Referral: R/O aspiration    Problem List  Principal Problem:    Acute on chronic diastolic heart failure [facility])      Past Medical History  Past Medical History:   Diagnosis Date    Anemia 04/20/2011    Negative scopes    Aortic dilatation ([facility] [address]     thoracic aorta - 4.6 cm -repeat CT chest in 8/2019    Arrhythmia     AFib-pacemaker July 2016 [facility]    ATRIAL FIBRILLATION     s/p cardioversion multiple times - now in NSR - Dr. Monique Del Valle, and Dr. Sabrina Contreras    Bilateral renal cysts     simple    Cataract     Clostridium difficile diarrhea     7/2020    Compression fracture     T8, T12    Disorder of thyroid     Essential hypertension     H/O cardiovascular stress test     NM stress testing - unremarkable (9/2015)    Hearing impairment     bilateral hearing aides    High blood pressure     High cholesterol     History of colonic polyps     Repeat Colonscopy PRN    HLD (hyperlipidemia) 04/20/2011    HTN (hypertension)     HYPOTHYROIDISM     since 2008    IFG (impaired fasting glucose)     Osteoarthritis     Osteopenia     but with compression fractures    Pacemaker     Prostate cancer [facility])     s/p prostatectomy -2003 - Urologist - Dr. Lit Cabezas    Pulmonary nodule     1.6 cm - stable x 3 years -no further f/up needed    Pulmonary nodule     new pulm nodule- repeat CT chest in 7/2018    Sacral fracture (HCC)     Shingles     Thrombocytopenia ([address] 04/20/2011    Visual impairment     glasses       Prior Living Situation: Home with spouse (caregiver)  Diet Prior to Admission: Regular; Thin liquids  Precautions: Aspiration    Patient/Family Goals: coughing while eating    SWALLOWING HISTORY  Current Diet Consistency: NPO  Dysphagia History:   BSE 3/29/21: regular/mildly thick  VFSS 3/30/21: regular/thin  BSE 9/16/22: bite sized/mildly thick - later upgraded to easy to chew    Imaging Results:     CXR 12/20/23:  CONCLUSION:      Moderate interstitial edema, unchanged. Dictated by (CST): Branden Lewis MD on 12/20/2023 at 8:14 PM       Finalized by (CST): Branden Lewis MD on 12/20/2023 at 8:15 PM     CHEST CT 12/20/23:  CONCLUSION:      No pulmonary embolus in the central, main, lobar, segmental pulmonary arteries. Nondiagnostic in the subsegmental pulmonary arteries due to respiratory motion artifact. Mild pulmonary edema with trace bilateral pleural effusions. Retained secretions within the trachea and bilateral mainstem bronchi. Cardiomegaly with severe left atrial enlargement and imaging findings suggestive of right heart dysfunction. Dictated by (CST): Branden Lewis MD on 12/20/2023 at 10:08 PM       Finalized by (CST): Branden Lewis MD on 12/20/2023 at 10:12 PM   OBJECTIVE   ORAL MOTOR EXAMINATION  Dentition: Upper partials; Functional  Symmetry: Within Functional Limits  Strength:  (reduced)     Range of Motion:  (reduced)  Rate of Motion: Reduced    Voice Quality: Weak  Respiratory Status: Unlabored  Consistencies Trialed: Thin liquids; Nectar thick liquids;Puree; Soft solid;Hard solid  Method of Presentation: Self presentation;Staff/Clinician assistance;Spoon;Cup;Single sips  Patient Positioning: Upright;Midline    Oral Phase of Swallow: Impaired  Bolus Retrieval: Intact  Bilabial Seal: Intact  Bolus Formation: Impaired  Bolus Propulsion: Impaired  Mastication: Impaired       Pharyngeal Phase of Swallow: Impaired  Laryngeal Elevation Timing: Appears impaired  Laryngeal Elevation Strength: Appears impaired     (Please note: Silent aspiration cannot be evaluated clinically.  Videofluoroscopic Swallow Study is required to rule-out silent aspiration.)    Esophageal Phase of Swallow: No complaints consistent with possible esophageal involvement      GOALS  Goal #1 VFSS to be completed to rule out aspiration and determine safest/least restrictive diet and/or further dysphagia goals. In Progress     FOLLOW UP  Treatment Plan/Recommendations: Videofluoroscopic swallow study  Number of Visits to Meet Established Goals: 1  Follow Up Needed (Documentation Required): Yes  SLP Follow-up Date: 12/21/23    Thank you for your referral.   If you have any questions, please contact Yvette Melton, MAHESH Mcgrath [facility]  Speech Language Pathologist  Phone Number [phone]

## 2023-12-21 NOTE — DISCHARGE INSTRUCTIONS
To assist with your medical needs based on your insurance we have a dedicated MA (Medicare advantage) team that can assist with:  1) refilling a prescription  2)addressing symptoms or medical questions  3)Scheduling an appointment with a [facility] Provider  4) Assisting with a [facility] Account    Call [phone] Monday-Friday 8am-5pm. This team is in addition to your primary care doctor.        You are advised to follow a restrictive diet with nectar thick liquids and soft easy to chew foods

## 2023-12-21 NOTE — SLP NOTE
ADULT VIDEOFLUOROSCOPIC SWALLOWING STUDY    Admission Date: 12/20/2023  Evaluation Date: 12/21/23  Radiologist: Dr. Barrera Setter: Soft/ Easy to chew  Diet Recommendations - Liquids: Nectar thick liquids/ Mildly thick    Further Follow-up:  Follow Up Needed (Documentation Required): Yes  SLP Follow-up Date: 12/22/23  Compensatory Strategies Recommended: No straws; Slow rate; Alternate consistencies;Small bites and sips;Multiple swallows  Aspiration Precautions: Upright position; Slow rate;Small bites and sips; No straw  Medication Administration Recommendations: Crushed in puree  Treatment Plan/Recommendations: Aspiration precautions    HISTORY   Background/Objective Information:    Problem List  Principal Problem:    Acute on chronic diastolic heart failure [facility])      Past Medical History  Past Medical History:   Diagnosis Date    Anemia 04/20/2011    Negative scopes    Aortic dilatation ([address]     thoracic aorta - 4.6 cm -repeat CT chest in 8/2019    Arrhythmia     AFib-pacemaker July 2016 [facility]    ATRIAL FIBRILLATION     s/p cardioversion multiple times - now in NSR - Dr. Nida Huerta, and Dr. Michelle Gonzalez    Bilateral renal cysts     simple    Cataract     Clostridium difficile diarrhea     7/2020    Compression fracture     T8, T12    Disorder of thyroid     Essential hypertension     H/O cardiovascular stress test     NM stress testing - unremarkable (9/2015)    Hearing impairment     bilateral hearing aides    High blood pressure     High cholesterol     History of colonic polyps     Repeat Colonscopy PRN    HLD (hyperlipidemia) 04/20/2011    HTN (hypertension)     HYPOTHYROIDISM     since 2008    IFG (impaired fasting glucose)     Osteoarthritis     Osteopenia     but with compression fractures    Pacemaker     Prostate cancer [facility])     s/p prostatectomy -2003 - Urologist - Dr. Beltrán Crumb    Pulmonary nodule     1.6 cm - stable x 3 years -no further f/up needed    Pulmonary nodule     new pulm nodule- repeat CT chest in 7/2018    Sacral fracture (HCC)     Shingles     Thrombocytopenia ([facility] [address] 04/20/2011    Visual impairment     glasses       Current Diet Consistency: NPO  Prior Level of Function: Assistance/Support for ADL's  Prior Living Situation: Home with spouse (caregiver)  History of Recent: Pneumonia; Difficulty breathing  Precautions: Aspiration  Imaging results:     CT CHEST 12/20/23:  CONCLUSION:      No pulmonary embolus in the central, main, lobar, segmental pulmonary arteries. Nondiagnostic in the subsegmental pulmonary arteries due to respiratory motion artifact. Mild pulmonary edema with trace bilateral pleural effusions. Retained secretions within the trachea and bilateral mainstem bronchi. Cardiomegaly with severe left atrial enlargement and imaging findings suggestive of right heart dysfunction. Dictated by (CST): Xiomara Hernández MD on 12/20/2023 at 10:08 PM       Finalized by (CST): Xiomara Hernández MD on 12/20/2023 at 10:12 PM     CXR 12/20/23:  CONCLUSION:      Moderate interstitial edema, unchanged. Dictated by (CST): Xiomara Hernández MD on 12/20/2023 at 8:14 PM       Finalized by (CST): Xiomara Hernández MD on 12/20/2023 at 8:15 PM     Reason for Referral: R/O aspiration    Family/Patient Goals: pt reports coughing while eating     ASSESSMENT   DYSPHAGIA ASSESSMENT  Test completed in conjunction with Radiologist.  Patient Positioned: Upright;Midline. Patient Viewed: Lateral.  Patient Alertness:  (Alert but requires cues). Consistencies Presented: Thin liquids; Nectar thick liquids/ Mildly thick;Puree to assess oropharyngeal swallow function and assess for compensatory strategies to improve safe swallow function. THIN LIQUIDS  Method of Presentation: Teaspoon;Cup  Oral Phase of Swallow (VFSS - Thin Liquids): Impaired  Bolus Retrieval (VFSS - Thin Liquids): Intact  Bilabial Seal (VFSS - Thin Liquids):  Intact  Bolus Formation (VFSS - Thin Liquids): Impaired  Bolus Propulsion (VFSS - Thin Liquids): Impaired  Triggered at: Valleculae;Pyriform sinuses  Premature Spillage to: Valleculae;Pyriform sinuses  Residue Severity, Location: Mild;Valleculae;Pyriform sinuses  Cleared/Reduced with: Secondary swallow  Laryngeal Penetration: During the swallow  Tracheal Aspiration: During the swallow; After the swallow  Cough/Throat Clear Response: Yes  Cough/Throat Clear Effective: No  Strategy(ies) Implemented (Thin Liquids): No straws; Slow rate; Liquids via spoon; Alternate consistencies;Small bites and sips;Multiple swallows  Effectiveness: Partial  NECTAR THICK LIQUIDS/ MILDLY THICK  Method of Presentation: Cup  Oral Phase of Swallow (VFSS - Nectar Thick Liquids): Impaired  Bolus Retrieval (VFSS - Nectar Thick Liquids): Intact  Bilabial Seal (VFSS - Nectar Thick Liquids): Intact  Bolus Formation (VFSS - Nectar Thick Liquids): Impaired  Bolus Propulsion (VFSS - Nectar Thick Liquids): Impaired  Triggered at: Valleculae;Pyriform sinuses  Premature Spillage to: Valleculae;Pyriform sinuses  Residue Severity, Location: Mild;Valleculae;Pyriform Sinuses  Cleared/Reduced with: Secondary swallow;Multiple swallows  Laryngeal Penetration: None  Tracheal Aspiration: None  Cough/Throat Clear Response: No  Strategy(ies) Implemented (Nectar Thick): No straws; Slow rate; Alternate consistencies;Small bites and sips  Effectiveness: Yes     PUREE  Oral Phase of Swallow (VFSS - Puree): Impaired  Bolus Retrieval (VFSS - Puree): Intact  Bilabial Seal (VFSS - Puree): Intact  Bolus Formation (VFSS - Puree): Impaired  Bolus Propulsion (VFSS - Puree): Impaired  Triggered at: Valleculae  Premature Spillage to: Valleculae  Residue Severity, Location: Mild;Valleculae;Pyriform sinuses  Cleared/Reduced with: Secondary swallow  Laryngeal Penetration: None  Tracheal Aspiration: None  Cough/Throat Clear Response: No  Strategy(ies) Implemented (Puree): No straws; Slow rate; Alternate consistencies;Small bites and sips  Effectiveness: Yes        Penetration Aspiration Scale Score: Score 7: Material enters the airway, passes below the vocal folds, and is not ejected from the trachea despite effort       Overall Impression: Pt presents with mild-moderate oropharyngeal dysphagia. Oral phase impaired as evidenced by oral holding with  increased NIRALI and premature spillage to level of valleculae and pyriform sinuses. Intermittent laryngeal penetration with aspiration during and after swallow with thin liquids via open cup. No obvious penetration or aspiration observed with puree and mildly thick liquids. Mild pharyngeal retention observed in valleculae and pyriform sinuses. Recommend easy to chew solids and mildly thick liquids (no straws). Results/recommendations discussed with pt, pt's wife, and caregiver at bedside. All v/u but would benefit from continued education. FCM Impression: Abnormal     GOALS  Goal #1 VFSS to be completed to rule out aspiration and determine safest/least restrictive diet and/or further dysphagia goals. Met   Goal #2 The patient will tolerate easy to chew consistency and mildly thick liquids without overt signs or symptoms of aspiration with 100 % accuracy over 3 session(s). In Progress   Goal #3 The patient/family/caregiver will demonstrate understanding and implementation of aspiration precautions and swallow strategies independently over 3 session(s). In Progress   Goal #4 The patient will utilize compensatory strategies as outlined by  BSSE (clinical evaluation) including Slow rate, Small bites, Small sips, Alternate liquids/solids, No straws, Upright 90 degrees, Eliminate distractions, Supervision with meals with moderate assistance 100 % of the time across 2 sessions. In Progress       PLAN: SLP to f/u x3 meal assessment, monitor CXR, and VFSS if clinically warranted. EDUCATION/INSTRUCTION  Reviewed results and recommendations with patient/family/caregiver. Agreement/Understanding verbalized and all questions answered to their apparent satisfaction. INTERDISCIPLINARY COMMUNICATION  Reviewed results with Radiologist; agreement verbalized. Patient Experiencing Pain: No    FOLLOW UP  Treatment Plan/Recommendations: Aspiration precautions  Number of Visits to Meet Established Goals: 3    Thank you for your referral.   If you have any questions, please contact Santos Wade, MAHESH Aparicio  Speech Language Pathologist  Phone Number [phone]

## 2023-12-21 NOTE — PLAN OF CARE
Patients denies pain and discomfort, education given on bed alarm and use of call light. safety precautions in place. Call light with reach. Plan cardiology to see am.  Problem: Patient Centered Care  Goal: Patient preferences are identified and integrated in the patient's plan of care  Description: Interventions:  - What would you like us to know as we care for you? Lives at home with spouse  - Provide timely, complete, and accurate information to patient/family  - Incorporate patient and family knowledge, values, beliefs, and cultural backgrounds into the planning and delivery of care  - Encourage patient/family to participate in care and decision-making at the level they choose  - Honor patient and family perspectives and choices  Outcome: Progressing     Problem: Patient/Family Goals  Goal: Patient/Family Long Term Goal  Description: Patient's Long Term Goal: Go home    Interventions:  - see MD orders  - See additional Care Plan goals for specific interventions  Outcome: Progressing  Goal: Patient/Family Short Term Goal  Description: Patient's Short Term Goal: feel better    Interventions:   - monitor labs   -monitor Vital signs  - See additional Care Plan goals for specific interventions  Outcome: Progressing     Problem: SAFETY ADULT - FALL  Goal: Free from fall injury  Description: INTERVENTIONS:  - Assess pt frequently for physical needs  - Identify cognitive and physical deficits and behaviors that affect risk of falls.   - [place] fall precautions as indicated by assessment.  - Educate pt/family on patient safety including physical limitations  - Instruct pt to call for assistance with activity based on assessment  - Modify environment to reduce risk of injury  - Provide assistive devices as appropriate  - Consider OT/PT consult to assist with strengthening/mobility  - Encourage toileting schedule  Outcome: Progressing     Problem: CARDIOVASCULAR - ADULT  Goal: Maintains optimal cardiac output and hemodynamic stability  Description: INTERVENTIONS:  - Monitor vital signs, rhythm, and trends  - Monitor for bleeding, hypotension and signs of decreased cardiac output  - Evaluate effectiveness of vasoactive medications to optimize hemodynamic stability  - Monitor arterial and/or venous puncture sites for bleeding and/or hematoma  - Assess quality of pulses, skin color and temperature  - Assess for signs of decreased coronary artery perfusion - ex.  Angina  - Evaluate fluid balance, assess for edema, trend weights  Outcome: Progressing  Goal: Absence of cardiac arrhythmias or at baseline  Description: INTERVENTIONS:  - Continuous cardiac monitoring, monitor vital signs, obtain 12 lead EKG if indicated  - Evaluate effectiveness of antiarrhythmic and heart rate control medications as ordered  - Initiate emergency measures for life threatening arrhythmias  - Monitor electrolytes and administer replacement therapy as ordered  Outcome: Progressing

## 2023-12-21 NOTE — ED QUICK NOTES
Per daughter request: pt will get confused when suddenly woken up and can be combative.  He is [facility]INETTE [facility]

## 2023-12-22 VITALS
SYSTOLIC BLOOD PRESSURE: 112 MMHG | DIASTOLIC BLOOD PRESSURE: 69 MMHG | OXYGEN SATURATION: 96 % | RESPIRATION RATE: 20 BRPM | WEIGHT: 148 LBS | HEART RATE: 70 BPM | BODY MASS INDEX: 23 KG/M2 | TEMPERATURE: 98 F

## 2023-12-22 PROCEDURE — 97530 THERAPEUTIC ACTIVITIES: CPT

## 2023-12-22 PROCEDURE — 97162 PT EVAL MOD COMPLEX 30 MIN: CPT

## 2023-12-22 PROCEDURE — 97166 OT EVAL MOD COMPLEX 45 MIN: CPT

## 2023-12-22 RX ORDER — QUETIAPINE FUMARATE 25 MG/1
50 TABLET, FILM COATED ORAL ONCE
Status: DISCONTINUED | OUTPATIENT
Start: 2023-12-22 | End: 2023-12-22

## 2023-12-22 RX ORDER — FUROSEMIDE 40 MG/1
40 TABLET ORAL DAILY
Status: SHIPPED | COMMUNITY
Start: 2023-12-22

## 2023-12-22 RX ORDER — HALOPERIDOL 5 MG/ML
1 INJECTION INTRAMUSCULAR AS NEEDED
Status: DISCONTINUED | OUTPATIENT
Start: 2023-12-22 | End: 2023-12-22

## 2023-12-22 NOTE — PHYSICAL THERAPY NOTE
PHYSICAL THERAPY EVALUATION - INPATIENT     Room Number: 510/510-A  Evaluation Date: 12/22/2023  Type of Evaluation: Initial   Physician Order: PT Eval and Treat    Presenting Problem: CHF exacerbation     Reason for Therapy: Mobility Dysfunction and Discharge Planning    PHYSICAL THERAPY ASSESSMENT     Patient is a 80year old male admitted 12/20/2023 for ***. Patient's current functional deficits include ***, which are below the patient's pre-admission status. ***. The patient's Approx Degree of Impairment: 54.16% has been calculated based on documentation in the [facility] '6 clicks' Inpatient Basic Mobility Short Form. Research supports that patients with this level of impairment may benefit from ***. DISCHARGE RECOMMENDATIONS  PT Discharge Recommendations: 24 hour care/supervision;Home with home health PT    PLAN    Patient has been evaluated and presents with no skilled Physical Therapy needs at this time. Patient will be discharged from Physical Therapy services. Please re-order if a new functional limitation presents during this admission. PHYSICAL THERAPY MEDICAL/SOCIAL HISTORY     History related to current admission: Patient was admitted for SOB, thought perhaps to be due to CHF but it turned out to be aspiration episode with coughing from not following diet restrictions previously in place. Patient was seen by cards inpatient and they also agreed there were no signs of CHF. Patient had a video swallow and education was provider to caretaker and wife on nectar thick liquids and small bites of soft foods. They understand that is he choose to no follow this altered diet it could cause SOB and PNA. Problem List  Principal Problem:    Acute on chronic diastolic heart failure ([address]      HOME SITUATION  Home Situation  Type of Home: House  Home Layout: Bed/bath upstairs; Two level  Stairs to Enter : 1  Railing: Yes  Stairs to Bedroom: 7  Railing: Yes (both sides)  Lives With: Spouse;Caregiver 24 hours ( Britni)  Drives: No  Patient Owned Equipment: Rolling walker;Cane (uses RW all the time)  Patient Regularly Uses: None     Prior Level of Lef[place]: ***    SUBJECTIVE  ***    PHYSICAL THERAPY EXAMINATION     OBJECTIVE  Precautions: Bed/chair alarm;Hard of hearing  Fall Risk: High fall risk    WEIGHT BEARING RESTRICTION  Weight Bearing Restriction: None                PAIN ASSESSMENT  Ratin          COGNITION  {Cognition:3840}    RANGE OF MOTION AND STRENGTH ASSESSMENT  Upper extremity ROM and strength are within functional limits {PT UE ROM Exception to WFLs:4397}  Lower extremity ROM is within functional limits {PT LE ROM Exception to WFLs:4503}  Lower extremity strength is within functional limits {PT LE Strength Exception to WFLs:4435}    BALANCE  Static Sitting: Good  Dynamic Sitting: Fair +  Static Standing: Poor +  Dynamic Standing: Poor +    ACTIVITY TOLERANCE  Pulse: 70 (74 with walking)  Heart Rate Source: Monitor     BP: 112/69 (at rest; with [id number])  BP Location: Left arm  BP Method: Automatic  Patient Position: Sitting    O2 WALK  Oxygen Therapy  SPO2% on Room Air at Rest: 96  SPO2% Ambulation on Room Air: 95    AM-PAC '6-Clicks' INPATIENT SHORT FORM - BASIC MOBILITY  How much difficulty does the patient currently have. .. Patient Difficulty: Turning over in bed (including adjusting bedclothes, sheets and blankets)?: A Little   Patient Difficulty: Sitting down on and standing up from a chair with arms (e.g., wheelchair, bedside commode, etc.): A Lot   Patient Difficulty: Moving from lying on back to sitting on the side of the bed?: A Little   How much help from another person does the patient currently need. ..    Help from Another: Moving to and from a bed to a chair (including a wheelchair)?: A Little   Help from Another: Need to walk in hospital room?: A Little   Help from Another: Climbing 3-5 steps with a railing?: A Lot     AM-PAC Score:  Raw Score: 16   Approx Degree of Impairment: 54.16%   Standardized Score (AM-PAC Scale): 40.78   CMS Modifier (G-Code): CK    FUNCTIONAL ABILITY STATUS  Functional Mobility/Gait Assessment  Gait Assistance: Minimum assistance (second person for SBA safety)  Distance (ft): 25'x4  Assistive Device: Rolling walker  Pattern: Shuffle;Festinating (narrow LUIS CARLOS; CG education and able to assist pt)    Bed Mobility: ***    Transfers: ***    Exercise/Education Provided:  {PT Treatment Provided:3926}    Patient End of Session: Up in chair;Needs met;Call light within reach;RN aware of session/findings; All patient questions and concerns addressed; Alarm set; Family present; Discussed recommendations with /    Patient was able to achieve the following . ..    Patient able to transfer  At previous, functional level  With assist from CG    Patient able to ambulate on level surfaces  At previous, functional level  With assist from CG     Patient Evaluation Complexity Level:  History High - 3 or more personal factors and/or co-morbidities   Examination of body systems Moderate - addressing a total of 3 or more elements   Clinical Presentation Moderate - Evolving   Clinical Decision Making Moderate Complexity       Therapeutic Activity: 28 minutes

## 2023-12-22 NOTE — PLAN OF CARE
AVS reviewed with patient, caregiver and wife at bedside. All questions answered, IV removed. Problem: Patient Centered Care  Goal: Patient preferences are identified and integrated in the patient's plan of care  Description: Interventions:  - What would you like us to know as we care for you? I live at home with my wife and caregiver  - Provide timely, complete, and accurate information to patient/family  - Incorporate patient and family knowledge, values, beliefs, and cultural backgrounds into the planning and delivery of care  - Encourage patient/family to participate in care and decision-making at the level they choose  - Honor patient and family perspectives and choices  12/22/2023 1125 by Austine Gaucher, RN  Outcome: Adequate for Discharge  12/22/2023 0935 by Austine Gaucher, RN  Outcome: Progressing     Problem: Patient/Family Goals  Goal: Patient/Family Long Term Goal  Description: Patient's Long Term Goal: To stay out of the hospital    Interventions:  - Medical management, follow up care  - See additional Care Plan goals for specific interventions  12/22/2023 1125 by Austine Gaucher, RN  Outcome: Adequate for Discharge  12/22/2023 0935 by Austine Gaucher, RN  Outcome: Progressing  Goal: Patient/Family Short Term Goal  Description: Patient's Short Term Goal: To feel better    Interventions:   [facility] admission  - See additional Care Plan goals for specific interventions  12/22/2023 1125 by Austine Gaucher, RN  Outcome: Adequate for Discharge  12/22/2023 0935 by Austine Gaucher, RN  Outcome: Progressing     Problem: SAFETY ADULT - FALL  Goal: Free from fall injury  Description: INTERVENTIONS:  - Assess pt frequently for physical needs  - Identify cognitive and physical deficits and behaviors that affect risk of falls.   - [place] fall precautions as indicated by assessment.  - Educate pt/family on patient safety including physical limitations  - Instruct pt to call for assistance with activity based on assessment  - Modify environment to reduce risk of injury  - Provide assistive devices as appropriate  - Consider OT/PT consult to assist with strengthening/mobility  - Encourage toileting schedule  12/22/2023 1125 by Danise Pallas, RN  Outcome: Adequate for Discharge  12/22/2023 0935 by Danise Pallas, RN  Outcome: Progressing     Problem: CARDIOVASCULAR - ADULT  Goal: Maintains optimal cardiac output and hemodynamic stability  Description: INTERVENTIONS:  - Monitor vital signs, rhythm, and trends  - Monitor for bleeding, hypotension and signs of decreased cardiac output  - Evaluate effectiveness of vasoactive medications to optimize hemodynamic stability  - Monitor arterial and/or venous puncture sites for bleeding and/or hematoma  - Assess quality of pulses, skin color and temperature  - Assess for signs of decreased coronary artery perfusion - ex.  Angina  - Evaluate fluid balance, assess for edema, trend weights  12/22/2023 1125 by Danise Pallas, RN  Outcome: Adequate for Discharge  12/22/2023 0935 by Danise Pallas, RN  Outcome: Progressing  Goal: Absence of cardiac arrhythmias or at baseline  Description: INTERVENTIONS:  - Continuous cardiac monitoring, monitor vital signs, obtain 12 lead EKG if indicated  - Evaluate effectiveness of antiarrhythmic and heart rate control medications as ordered  - Initiate emergency measures for life threatening arrhythmias  - Monitor electrolytes and administer replacement therapy as ordered  12/22/2023 1125 by Danise Pallas, RN  Outcome: Adequate for Discharge  12/22/2023 0935 by Danise Pallas, RN  Outcome: Progressing     Problem: Safety Risk - Non-Violent Restraints  Goal: Patient will remain free from self-harm  Description: INTERVENTIONS:  - Apply the least restrictive restraint to prevent harm  - Notify patient and family of reasons restraints applied  - Assess for any contributing factors to confusion (electrolyte disturbances, delirium, medications)  - Discontinue any unnecessary medical devices as soon as possible  - Assess the patient's physical comfort, circulation, skin condition, hydration, nutrition and elimination needs   - Reorient and redirection as needed  - Assess for the need to continue restraints  12/22/2023 1125 by Fernanda Gamino RN  Outcome: Adequate for Discharge  12/22/2023 0935 by Fernanda Gamino RN  Outcome: Progressing

## 2023-12-22 NOTE — PLAN OF CARE
Patient oriented x2, combative and aggressive throughout the night, scratching and attempting to hit staff. Refused to keep tele monitor on, MD notified. Pt refused 1 time dose of Seroquel. Soft restraints applied. Frequent nurse rounding done. Called daughter Jayson Carrera, left  for call back. Updated wife Yadi Ott and her CG Magan Ghotra, they stated they will be in this morning to see the patient. Problem: Patient Centered Care  Goal: Patient preferences are identified and integrated in the patient's plan of care  Description: Interventions:  - What would you like us to know as we care for you? I live at home with my wife and caregiver  - Provide timely, complete, and accurate information to patient/family  - Incorporate patient and family knowledge, values, beliefs, and cultural backgrounds into the planning and delivery of care  - Encourage patient/family to participate in care and decision-making at the level they choose  - Honor patient and family perspectives and choices  Outcome: Progressing     Problem: Patient/Family Goals  Goal: Patient/Family Long Term Goal  Description: Patient's Long Term Goal: To stay out of the hospital    Interventions:  - Medical management, follow up care  - See additional Care Plan goals for specific interventions  Outcome: Progressing  Goal: Patient/Family Short Term Goal  Description: Patient's Short Term Goal: To feel better    Interventions:   - Hospital admission  - See additional Care Plan goals for specific interventions  Outcome: Progressing     Problem: SAFETY ADULT - FALL  Goal: Free from fall injury  Description: INTERVENTIONS:  - Assess pt frequently for physical needs  - Identify cognitive and physical deficits and behaviors that affect risk of falls.   - [place] fall precautions as indicated by assessment.  - Educate pt/family on patient safety including physical limitations  - Instruct pt to call for assistance with activity based on assessment  - Modify environment to reduce risk of injury  - Provide assistive devices as appropriate  - Consider OT/PT consult to assist with strengthening/mobility  - Encourage toileting schedule  Outcome: Progressing     Problem: CARDIOVASCULAR - ADULT  Goal: Maintains optimal cardiac output and hemodynamic stability  Description: INTERVENTIONS:  - Monitor vital signs, rhythm, and trends  - Monitor for bleeding, hypotension and signs of decreased cardiac output  - Evaluate effectiveness of vasoactive medications to optimize hemodynamic stability  - Monitor arterial and/or venous puncture sites for bleeding and/or hematoma  - Assess quality of pulses, skin color and temperature  - Assess for signs of decreased coronary artery perfusion - ex.  Angina  - Evaluate fluid balance, assess for edema, trend weights  Outcome: Progressing  Goal: Absence of cardiac arrhythmias or at baseline  Description: INTERVENTIONS:  - Continuous cardiac monitoring, monitor vital signs, obtain 12 lead EKG if indicated  - Evaluate effectiveness of antiarrhythmic and heart rate control medications as ordered  - Initiate emergency measures for life threatening arrhythmias  - Monitor electrolytes and administer replacement therapy as ordered  Outcome: Progressing     Problem: Safety Risk - Non-Violent Restraints  Goal: Patient will remain free from self-harm  Description: INTERVENTIONS:  - Apply the least restrictive restraint to prevent harm  - Notify patient and family of reasons restraints applied  - Assess for any contributing factors to confusion (electrolyte disturbances, delirium, medications)  - Discontinue any unnecessary medical devices as soon as possible  - Assess the patient's physical comfort, circulation, skin condition, hydration, nutrition and elimination needs   - Reorient and redirection as needed  - Assess for the need to continue restraints  Outcome: Progressing

## 2023-12-22 NOTE — PLAN OF CARE
Problem: Patient Centered Care  Goal: Patient preferences are identified and integrated in the patient's plan of care  Description: Interventions:  - What would you like us to know as we care for you? I live at home with my wife and caregiver  - Provide timely, complete, and accurate information to patient/family  - Incorporate patient and family knowledge, values, beliefs, and cultural backgrounds into the planning and delivery of care  - Encourage patient/family to participate in care and decision-making at the level they choose  - Honor patient and family perspectives and choices  Outcome: Progressing     Problem: Patient/Family Goals  Goal: Patient/Family Long Term Goal  Description: Patient's Long Term Goal: To stay out of the hospital    Interventions:  - Medical management, follow up care  - See additional Care Plan goals for specific interventions  Outcome: Progressing  Goal: Patient/Family Short Term Goal  Description: Patient's Short Term Goal: To feel better    Interventions:   - Hospital admission  - See additional Care Plan goals for specific interventions  Outcome: Progressing     Problem: SAFETY ADULT - FALL  Goal: Free from fall injury  Description: INTERVENTIONS:  - Assess pt frequently for physical needs  - Identify cognitive and physical deficits and behaviors that affect risk of falls.   - [place] fall precautions as indicated by assessment.  - Educate pt/family on patient safety including physical limitations  - Instruct pt to call for assistance with activity based on assessment  - Modify environment to reduce risk of injury  - Provide assistive devices as appropriate  - Consider OT/PT consult to assist with strengthening/mobility  - Encourage toileting schedule  Outcome: Progressing     Problem: CARDIOVASCULAR - ADULT  Goal: Maintains optimal cardiac output and hemodynamic stability  Description: INTERVENTIONS:  - Monitor vital signs, rhythm, and trends  - Monitor for bleeding, hypotension and signs of decreased cardiac output  - Evaluate effectiveness of vasoactive medications to optimize hemodynamic stability  - Monitor arterial and/or venous puncture sites for bleeding and/or hematoma  - Assess quality of pulses, skin color and temperature  - Assess for signs of decreased coronary artery perfusion - ex.  Angina  - Evaluate fluid balance, assess for edema, trend weights  Outcome: Progressing  Goal: Absence of cardiac arrhythmias or at baseline  Description: INTERVENTIONS:  - Continuous cardiac monitoring, monitor vital signs, obtain 12 lead EKG if indicated  - Evaluate effectiveness of antiarrhythmic and heart rate control medications as ordered  - Initiate emergency measures for life threatening arrhythmias  - Monitor electrolytes and administer replacement therapy as ordered  Outcome: Progressing     Problem: Safety Risk - Non-Violent Restraints  Goal: Patient will remain free from self-harm  Description: INTERVENTIONS:  - Apply the least restrictive restraint to prevent harm  - Notify patient and family of reasons restraints applied  - Assess for any contributing factors to confusion (electrolyte disturbances, delirium, medications)  - Discontinue any unnecessary medical devices as soon as possible  - Assess the patient's physical comfort, circulation, skin condition, hydration, nutrition and elimination needs   - Reorient and redirection as needed  - Assess for the need to continue restraints  Outcome: Progressing

## 2023-12-22 NOTE — DISCHARGE SUMMARY
General Medicine Discharge Summary     Patient ID:  Wendy Nieves  80year old  7/1/1934    Admit date: 12/20/2023    Discharge date and time: 12/22/2023 12:12 PM     Attending Physician: Roland Enrique: ABI CONSULT TO CARDIOLOGY    Primary Care Physician: Peggy Medina MD     Reason for admission:  aspiration event causing SOB     Risk For Readmission: Moderate     Discharge Diagnoses: Acute on chronic diastolic heart failure ([address] [I50.33]  See Additional Discharge Diagnoses in Hospital Course    Discharged Condition: fair    Follow-up with labs/images appointments:   Close follow-up with PCP was attempted to be arranged, wife deferred at this time despite understanding the importance of such follow-up       Exam  Gen: No acute distress, sleepy   Pulm: Lungs clear, normal respiratory effort  CV: soft systolic murmur   EXT: bruises on legs     HPI:     80year-old gentleman with history of hypothyroidism, hypertension, dyslipidemia, osteoporosis, paroxysmal atrial fibrillation,  prostate cancer, atrial fibrillation on Xarelto, PEm, status post permanent pacemaker, Alzheimer's disease, lung nodules, CKD stage 3, skin cancer, MGUS, chroni ITP, OA, oropharyngeal dysphagia, Dilated aorta,  chronic diastolic chf  who presents with wheezing  worse with eating and feelings of throat fullness. Denies cp, sob or LE edema. CT with thin secretions within the trachea and bilateral mainstem bronchus, he does have a history of modified diet in the past, video swallow pending. There was mild component of CHF on imaging but not noted on physical exam, cardiology consulted and patient given 1 dose of IV Lasix. Potential discharge later today on swallow and cardiology input,       Hospital Course:   Patient was admitted for SOB, thought perhaps to be due to CHF but it turned out to be aspiration episode with coughing from not following diet restrictions previously in place.  Patient was seen by [facility] inpatient and they also agreed there were no signs of CHF. Patient had a video swallow and education was provider to caretaker and wife on nectar thick liquids and small bites of soft foods. They understand that is he choose to no follow this altered diet it could cause SOB and PNA. The night prior to discharge patient had sundowning requiring restraints and medications wife states this has happened previously. We have advised ongoing discussions with PCP to further decide on overall goals of care and end of life plan as I expect further admissions for similar problems in the coming year. Operative Procedures:      Imaging: XR VIDEO SWALLOW (CPT=74230)    Result Date: 12/21/2023  CONCLUSION:  1. Deep laryngeal penetration and transient aspiration through the vocal cords triggered cough reflex. Dictated by (CST): Veda Mckinley MD on 12/21/2023 at 1:31 PM     Finalized by (CST): Veda Mckinley MD on 12/21/2023 at 1:33 PM          CT CHEST PE AORTA (IV ONLY) (CPT=71260)    Result Date: 12/20/2023  CONCLUSION:   No pulmonary embolus in the central, main, lobar, segmental pulmonary arteries. Nondiagnostic in the subsegmental pulmonary arteries due to respiratory motion artifact. Mild pulmonary edema with trace bilateral pleural effusions. Retained secretions within the trachea and bilateral mainstem bronchi. Cardiomegaly with severe left atrial enlargement and imaging findings suggestive of right heart dysfunction. Dictated by (CST): Karla Pineda MD on 12/20/2023 at 10:08 PM     Finalized by (CST): Karla Pineda MD on 12/20/2023 at 10:12 PM          XR CHEST AP PORTABLE  (CPT=71045)    Result Date: 12/20/2023  CONCLUSION:   Moderate interstitial edema, unchanged.   Dictated by (CST): Karla Pineda MD on 12/20/2023 at 8:14 PM     Finalized by (CST): Karla Pineda MD on 12/20/2023 at 8:15 PM           Disposition: home    Activity: activity as tolerated  Diet: Soft small bites, nectar thick liquids   Wound Care: none needed  Code Status: Full Code  O2: None    Home Medication Changes: See list below     Med list     Medication List        CHANGE how you take these medications      furosemide 40 MG Tabs  Commonly known as: Lasix  What changed:   medication strength  how much to take            CONTINUE taking these medications      aspirin 81 MG Tbec     CALCIUM 600 + D OR     cyanocobalamin 1000 MCG Tabs  Commonly known as: Vitamin B12  Take 1 tablet (1,000 mcg total) by mouth daily. docusate sodium 100 MG Caps  Commonly known as: COLACE     ferrous sulfate 325 (65 FE) MG Tbec     fluorouracil 5 % Crea  Commonly known as: Efudex     levothyroxine 112 MCG Tabs  Commonly known as: Synthroid     mirtazapine 7.5 MG Tabs  Commonly known as: Remeron     pantoprazole 40 MG Tbec  Commonly known as: Protonix     triamcinolone 0.1 % Crea  Commonly known as: Kenalog     Vitamin D 50 MCG (2000 UT) Caps              FU   Follow-up Information       Trudi Morton MD Follow up. Specialty: Internal Medicine  Why: You will need to see primary care provider in the next 3-7 days please  Contact information:  [address] [facility] [address], [address] [facility] [phone]                             [place] instructions: Other Discharge Instructions: To assist with your medical needs based on your insurance we have a dedicated [place] (Medicare advantage) team that can assist with:  1) refilling a prescription  2)addressing symptoms or medical questions  3)Scheduling an appointment with a [facility] Provider  4) Assisting with a [employer] Account    Call [phone] Monday-Friday 8am-5pm. This team is in addition to your primary care doctor. You are advised to follow a restrictive diet with nectar thick liquids and soft easy to chew foods         I reconciled current and discharge medications on day of discharge, discussed changes with patient and noted changes above.        Total Time Coordinating Care: 35 minutes    Patient had opportunity to ask questions and state understand and agree with therapeutic plan as outlined    Thank You,    Micah Esquivel DO   Hospitalist with [facility] and Care

## 2023-12-22 NOTE — CM/SW NOTE
12/22/23 1000   Discharge disposition   Expected discharge disposition Home or [address] [place] [address] services after discharge Employed caregiver   Discharge transportation Private car     ANÍBAL confirmed with MEGHNA Olvera that pt medically stable to DC. Pt has DC order. ANÍBAL confirmed with MEHGNA Olvera that PT was going to evaluate pt before DC. Per PT, pt is ambulatory and at his baseline level of functioning and can DC home. ANÍBAL spoke to pt, wife Chet Minor and caregiver Neftaly Catherine at bedside. The family's plan is to set up a home health nurse outpatient and continue with employed caregiver services. Family feels comfortable transporting pt home.     PLAN: DC home no skilled needs; employed caregiver in the household     Karyna Hendrix MSW, [address]

## 2024-02-01 ENCOUNTER — OFFICE VISIT (OUTPATIENT)
Dept: OTOLARYNGOLOGY | Facility: CLINIC | Age: 89
End: 2024-02-01

## 2024-02-01 DIAGNOSIS — H61.23 BILATERAL IMPACTED CERUMEN: Primary | ICD-10-CM

## 2024-02-01 PROCEDURE — 69210 REMOVE IMPACTED EAR WAX UNI: CPT | Performed by: OTOLARYNGOLOGY

## 2024-02-01 NOTE — PROGRESS NOTES
Mireille Lantigua is a 89 year old male.    Chief Complaint   Patient presents with    Follow - Up     Ear cleaning        HISTORY OF PRESENT ILLNESS    Patient presents for cerumen removal. No other complaints or concerns at this time    Social History     Socioeconomic History    Marital status:    Tobacco Use    Smoking status: Former     Years: 10     Types: Cigarettes    Smokeless tobacco: Never   Vaping Use    Vaping Use: Never used   Substance and Sexual Activity    Alcohol use: Yes     Alcohol/week: 0.0 - 1.0 standard drinks of alcohol     Comment: 2 beers a month    Drug use: No   Other Topics Concern    Caffeine Concern No       Family History   Problem Relation Age of Onset    Hypertension Father     Diabetes Mother     Obesity Mother     Heart Disorder Mother         MI    Heart Disease Mother         CAD    Cancer Brother         Brain cancer       Past Medical History:   Diagnosis Date    Anemia 04/20/2011    Negative scopes    Aortic dilatation (HCC)     thoracic aorta - 4.6 cm -repeat CT chest in 8/2019    Arrhythmia     AFib-pacemaker July 2016 [facility]    ATRIAL FIBRILLATION     s/p cardioversion multiple times - now in [facility] - Dr. Daniel Jones, and Dr. Stevenson    Bilateral renal cysts     simple    Cataract     Clostridium difficile diarrhea     7/2020    Compression fracture     T8, T12    Disorder of thyroid     Essential hypertension     H/O cardiovascular stress test     NM stress testing - unremarkable (9/2015)    Hearing impairment     bilateral hearing aides    High blood pressure     High cholesterol     History of colonic polyps     Repeat Colonscopy PRN    HLD (hyperlipidemia) 04/20/2011    HTN (hypertension)     HYPOTHYROIDISM     since 2008    IFG (impaired fasting glucose)     Osteoarthritis     Osteopenia     but with compression fractures    Pacemaker     Prostate cancer (HCC)     s/p prostatectomy -2003 - Urologist - Dr. Calvert    Pulmonary nodule     1.6 cm - stable x 3 years -no  further f/up needed    Pulmonary nodule     new pulm nodule- repeat CT chest in 7/2018    Sacral fracture (HCC)     Shingles     Thrombocytopenia (HCC) 04/20/2011    Visual impairment     glasses       Past Surgical History:   Procedure Laterality Date    ABLATION      a-fib ablation -> 2008    BACK SURGERY      kyphoplasty - 7/72012    CARDIAC PACEMAKER PLACEMENT      July 2016 last checked 4/2017    COLONOSCOPY N/A 9/20/2017    Procedure: COLONOSCOPY, POSSIBLE BIOPSY, POSSIBLE POLYPECTOMY 50666;  Surgeon: Jamari Phelps MD;  Location: [facility]    COLONOSCOPY,BIOPSY  9/12/2011    Performed by JAMARI PHELPS at [facility]    COLONOSCOPY,BIOPSY  10/17/2012    Procedure: COLONOSCOPY, POSSIBLE BIOPSY, POSSIBLE POLYPECTOMY 35972;  Surgeon: Jamari Phelps MD;  Location: [facility]    COLONOSCOPY,REMV LESN,SNARE  10/17/2012    Procedure: COLONOSCOPY, POSSIBLE BIOPSY, POSSIBLE POLYPECTOMY 18617;  Surgeon: Jamari Phelps MD;  Location: [facility]    GENITAL SURG PROC,MALE UNLISTED      Prostatectomy - 2003    HC KYPHOPLASTY LUMBAR      HERNIA SURGERY  4/21/17    HIP REPLACEMENT SURGERY      L hip replacement - 1994    HIP REPLACEMENT SURGERY Left 2011    LAPAROSCOPY, SURGICAL PROSTATECTOMY, RETROPUBIC RADICAL, W/NERVE SPARING      OTHER  04/19/2018    pacer left lead revision    OTHER SURGICAL HISTORY      cardioversion multiple    OTHER SURGICAL HISTORY      HH 4/20 - L total hip replacement    OTHER SURGICAL HISTORY      s/p cardiac ablation/and pacemaker 7/20/2016    PACEMAKER MONITOR  07/2016    PATIENT DOCUMENTED NOT TO HAVE EXPERIENCED ANY OF THE FOLLOWING EVENTS  10/17/2012    Procedure: COLONOSCOPY, POSSIBLE BIOPSY, POSSIBLE POLYPECTOMY 05824;  Surgeon: Jamari Phelps MD;  Location: [facility]    PATIENT WITHOUGH PREOPERATIVE ORDER FOR IV ANTIBIOTIC SURGICAL SITE INFECTION PROPHYLAXIS.  10/17/2012    Procedure: COLONOSCOPY, POSSIBLE BIOPSY, POSSIBLE  POLYPECTOMY 15671;  Surgeon: Jamari Phelps MD;  Location: [facility]    TOTAL HIP REPLACEMENT      left hip    UPPER GI ENDOSCOPY,BIOPSY  9/12/2011    Performed by JAMARI PHELPS at [facility]       REVIEW OF SYSTEMS    System Neg/Pos Details   Constitutional Negative Fatigue, fever and weight loss.   ENMT Negative Drooling.   Eyes Negative Blurred vision and vision changes.   Respiratory Negative Dyspnea and wheezing.   Cardio Negative Chest pain, irregular heartbeat/palpitations and syncope.   GI Negative Abdominal pain and diarrhea.   Endocrine Negative Cold intolerance and heat intolerance.   Neuro Negative Tremors.   Psych Negative Anxiety and depression.   Integumentary Negative Frequent skin infections, pigment change and rash.   Hema/Lymph Negative Easy bleeding and easy bruising.           PHYSICAL EXAM    There were no vitals taken for this visit.       Constitutional Normal Overall appearance - Normal.        Neck Exam Normal Inspection - Normal. Palpation - Normal. Parotid gland - Normal. Thyroid gland - Normal.             Head/Face Normal Facial features - Normal. Eyebrows - Normal. Skull - Normal.             Ears Normal Inspection - Right: Normal, Left: Normal. Canal - Right: Normal, Left: Normal. TM - Right: Normal, Left: Normal.   Skin Normal Inspection - Normal.                              Canals:  Right: Canal reveals cerumen impaction,   Left: Canal reveals cerumen impaction,     Tympanic Membranes:  Right: Normal tympanic membrane.   Left: Normal tympanic membrane.     TM Visualized Method:   Right TM examined via otomicroscopy.    Left TM examined via otomicroscopy.      PROCEDURE:    Removal of cerumen impaction   The cerumen impaction was completely removed using microscopy.   Removal was completed by using acurette and/or suction.   Comments: Return to clinic as needed.  Avoid q-tips, water precautions and use over the counter wax remedies as needed.      Current  Outpatient Medications:     furosemide 40 MG Oral Tab, Take 1 tablet (40 mg total) by mouth daily., Disp: , Rfl:     Calcium Carb-Cholecalciferol (CALCIUM 600 + D OR), Take 1 tablet by mouth daily., Disp: , Rfl:     Cholecalciferol (VITAMIN D) 50 MCG (2000 UT) Oral Cap, Take 1 tablet by mouth daily., Disp: , Rfl:     pantoprazole 40 MG Oral Tab EC, Take 1 tablet (40 mg total) by mouth every morning before breakfast., Disp: , Rfl:     triamcinolone 0.1 % External Cream, Apply topically 2 (two) times daily., Disp: , Rfl:     mirtazapine 7.5 MG Oral Tab, Take 3 tablets (22.5 mg total) by mouth nightly., Disp: , Rfl:     aspirin 81 MG Oral Tab EC, Take 1 tablet (81 mg total) by mouth daily., Disp: , Rfl:     levothyroxine 112 MCG Oral Tab, Take 1 tablet (112 mcg total) by mouth daily., Disp: , Rfl:     docusate sodium 100 MG Oral Cap, Take 100 mg by mouth 2 (two) times daily., Disp: , Rfl: 0    ferrous sulfate 325 (65 FE) MG Oral Tab EC, Take 1 tablet (325 mg total) by mouth 2 (two) times daily with meals., Disp: , Rfl:     fluorouracil 5 % External Cream, , Disp: , Rfl:     Vitamin B-12 1000 MCG Oral Tab, Take 1 tablet (1,000 mcg total) by mouth daily., Disp: 90 tablet, Rfl: 3  ASSESSMENT AND PLAN    1. Bilateral impacted cerumen        All cerumen was removed using microscopy. I have asked the patient to return to see me as needed for repeat cerumen removal in the future.      Kaiden Wagner MD    2/1/2024    11:38 AM

## 2024-02-06 ENCOUNTER — HOSPITAL ENCOUNTER (OUTPATIENT)
Dept: GENERAL RADIOLOGY | Facility: HOSPITAL | Age: 89
Discharge: HOME | End: 2024-02-06
Attending: INTERNAL MEDICINE
Payer: MEDICARE

## 2024-02-06 DIAGNOSIS — R13.12 OROPHARYNGEAL DYSPHAGIA: ICD-10-CM

## 2024-02-06 PROCEDURE — 74230 X-RAY XM SWLNG FUNCJ C+: CPT | Performed by: INTERNAL MEDICINE

## 2024-02-06 PROCEDURE — 92611 MOTION FLUOROSCOPY/SWALLOW: CPT

## 2024-02-06 NOTE — PROGRESS NOTES
ADULT VIDEOFLUOROSCOPIC SWALLOWING STUDY       ADULT VIDEOFLUOROSCOPIC SWALLOWING STUDY:   Referring Physician: Mitchel      Radiologist: Dr. Sánchez  Diagnosis: dysphagia    Date of Service: 2/6/2024     PATIENT SUMMARY   Chief Complaint: The patient has been coughing when eating.   Pt was seen for a VFSS during hospitalization on 12/21/23 which revealed intermittent aspiration of thin liquids.  See results below.  Thickened liquids and dysphagia therapy were recommended.  Pt was seen for home health speech therapy for dysphagia and was discharged on 1/3/24.  Further therapy to be scheduled as needed after this follow up assessment.    12/21/23 VFSS:  Pt presents with mild-moderate oropharyngeal dysphagia. Oral phase impaired as evidenced by oral holding with increased NIRALI and premature spillage to level of valleculae and pyriform sinuses. Intermittent laryngeal penetration with aspiration during and after swallow with thin liquids via open cup. No obvious penetration or aspiration observed with puree and mildly thick liquids. Mild pharyngeal retention observed in valleculae and pyriform sinuses. Recommend easy to chew solids and mildly thick liquids (no straws).     Current Diet: soft easy to chew diet and mildly thick liquids       Problem List  Active Problems:  Active Problems:    * No active hospital problems. *      Past Medical History  Past Medical History:   Diagnosis Date    Anemia 04/20/2011    Negative scopes    Aortic dilatation (HCC)     thoracic aorta - 4.6 cm -repeat CT chest in 8/2019    Arrhythmia     AFib-pacemaker July 2016 [facility]    ATRIAL FIBRILLATION     s/p cardioversion multiple times - now in [facility] - Dr. Daniel Jones, and Dr. Stevenson    Bilateral renal cysts     simple    Cataract     Clostridium difficile diarrhea     7/2020    Compression fracture     T8, T12    Disorder of thyroid     Essential hypertension     H/O cardiovascular stress test     NM stress testing - unremarkable  (9/2015)    Hearing impairment     bilateral hearing aides    High blood pressure     High cholesterol     History of colonic polyps     Repeat Colonscopy PRN    HLD (hyperlipidemia) 04/20/2011    HTN (hypertension)     HYPOTHYROIDISM     since 2008    IFG (impaired fasting glucose)     Osteoarthritis     Osteopenia     but with compression fractures    Pacemaker     Prostate cancer (HCC)     s/p prostatectomy -2003 - Urologist - Dr. Calvert    Pulmonary nodule     1.6 cm - stable x 3 years -no further f/up needed    Pulmonary nodule     new pulm nodule- repeat CT chest in 7/2018    Sacral fracture (HCC)     Shingles     Thrombocytopenia (HCC) 04/20/2011    Visual impairment     glasses        Imaging results: 12/20/23 Chest CT:  LUNGS AND PLEURA: Mild interlobular septal thickening is seen within the bilateral upper and lower lobes.  There are small trace bilateral pleural effusions. There is mild bronchial wall thickening suggestive of chronic airway inflammation. Nodularity   seen within the trachea and within the bilateral mainstem bronchi suggestive of retained secretions.       12/20/23 CXR:  Moderate interstitial edema, unchanged.     ASSESSMENT   DYSPHAGIA ASSESSMENT  Test completed in conjunction with Radiologist.   Food/Liquid Types Presented: puree, solid, nectar/mildly thick liquid, and thin liquids.    Study Position and View:  Patient was seated upright and viewed laterally.    Pain Assessment: The patient reports pain at a level of 0/10.    Oral phase:  Bilabial seal was WNL with no anterior food or liquid loss.  Oral preparation and transit times were mildly increased with intermittent oral hold of liquids before initiating the swallow.  Reduced bolus control and containment resulted in full to partial premature spillage of liquids.  Mastication times were mild-moderately increased and with one dimensional up and down movement rather than rotary chewing. Mild oral residue was cleared with second  swallow, occasionally requiring cue.     Pharyngeal phase:  The pharyngeal response triggered at the tongue base to valleculae for puree, the tongue base for solids, and the valleculae to pyriform sinuses for liquids due to premature spillage, with intermittent 2-3 second delay.  Premature spillage and incomplete epiglottic vestibular closure resulted in intermittent aspiration of thin liquids by cup and straw.  Pt coughed in response., but was not able to eject the aspiration from the trachea.  Reduced base of tongue retraction and pharyngeal stripping resulted in mild vallecular and minimal pyriform sinus retention.  Hyolaryngeal excursion appeared WNL.    Esophageal phase:   Adequate flow of bolus through upper esophagus     Penetration Aspiration Scale: 7/8.  Material entered the airway, passed below the vocal cords, and was not ejected from the trachea despite effort.    Overall Impression: Mild-moderate oropharyngeal dysphagia characterized by oral holding, increased oral transit times, premature spillage to level of valleculae and pyriform sinuses, intermittent laryngeal penetration with aspiration during the swallow with thin liquids via open cup. No obvious penetration or aspiration observed with any other consistency. Mild pharyngeal retention remained in valleculae and pyriform sinuses. Recommend easy to chew solids and mildly thick liquids (no straws) and continued home health dysphagia therapy with SLP.    FCM category and level: Swallowing, 4  PLAN   Potential: Good    Diet Recommendations:  Solids:  Soft easy to chew diet  Liquids:  Mildly thick liquids    Recommended compensatory strategies:   Sit upright  Small, single sips    Medication Administration:  Present with thickened liquids    Further Follow-up:  Recommend continued home health dysphagia therapy to focus on airway closure, oral containment and improving productive cough.        EDUCATION/INSTRUCTION  Reviewed results and recommendations  with patient and family.  Written instructions were provided.  Agreement/Understanding verbalized and all questions answered to their apparent satisfaction.      INTERDISCIPLINARY COMMUNICATION  Reviewed results with Radiologist; agreement verbalized.        Patient/Family/Caregiver was advised of these findings, precautions, recommendations and treatment options and has agreed to actively participate in planning and for this course of care.    Thank you for your referral. Please co-sign or sign and return this letter via fax as soon as possible. If you have any questions, please contact me at [phone].    Samina Vallecillo MA/ESME-SLP  Speech Language Pathologist  [facility]  [phone]    Electronically signed by therapist: Samina Vallecillo, SLP  Physician's certification required:  Yes  I certify the need for these services furnished under this plan of treatment and while under my care.    X___________________________________________________ Date____________________    Certification From: 2/6/2024  To:5/6/2024

## 2024-02-21 ENCOUNTER — TELEPHONE (OUTPATIENT)
Dept: SURGERY | Facility: CLINIC | Age: 89
End: 2024-02-21

## 2024-02-21 NOTE — TELEPHONE ENCOUNTER
Appointment has been scheduled, awaiting a call back confirmation from the patient.      Clinical instructions below:    Spoke to Dona and since patient wants go to to [place] lets offer 2/27@8:30 with .

## 2024-02-27 ENCOUNTER — OFFICE VISIT (OUTPATIENT)
Dept: SURGERY | Facility: CLINIC | Age: 89
End: 2024-02-27
Payer: MEDICARE

## 2024-02-27 VITALS
DIASTOLIC BLOOD PRESSURE: 78 MMHG | BODY MASS INDEX: 22.01 KG/M2 | SYSTOLIC BLOOD PRESSURE: 127 MMHG | WEIGHT: 145.19 LBS | OXYGEN SATURATION: 98 % | HEIGHT: 68 IN | HEART RATE: 70 BPM | RESPIRATION RATE: 70 BRPM | TEMPERATURE: 98 F

## 2024-02-27 DIAGNOSIS — C44.722 SQUAMOUS CELL CARCINOMA OF SKIN OF RIGHT LOWER EXTREMITY: Primary | ICD-10-CM

## 2024-02-27 PROCEDURE — 99205 OFFICE O/P NEW HI 60 MIN: CPT | Performed by: SURGERY

## 2024-02-27 NOTE — PATIENT INSTRUCTIONS
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, [place] [address]   Phone: [phone]    [facility] [address] [place] [address]  Phone: [phone]    This is an outpatient procedure.  Use the provided Chlorhexadine surgical soap(instructions attached) to shower the night before and morning of your procedure.  Do not apply powders, creams, lotions or deodorant after showering.  Do not apply any kind of makeup and make sure to remove nail polish prior to your surgery.  For faster recovery from anesthesia and surgery please follow the instructions below regarding your pre-op diet:  12 hours prior to your surgery time you are to drink one 10oz bottle of Ensure Pre-Surgery Drink. You are to have NO solid food or water after 11pm the night before your surgery EXCEPT one additional 10oz bottle of Ensure Pre-Surgery Drink. You need to finish drinking this 4 hours prior to surgery time.  Bring your picture ID and insurance card with you.  Wear comfortable clothing that can easily be removed. Preferably, something that zips, snaps, or buttons up the front.   You will be contacted by the hospital the day prior to your surgery to confirm details and give you specific instructions about when and where to arrive the day of your procedure.   If you are taking blood thinners including: Plavix, Eliquis, Coumadin you will need to contact the prescribing provider for specific instructions on holding these medications for your procedure  Motrin, Advil, Ibuprofen, Aspirin, Baby Aspirin and Fish Oil are also blood thinners and need to be held at least one week prior to your procedure. It is okay to take Tylenol.  Inform your primary care physician of your surgery and ask if him/her will need to see you prior to surgery.      Pre-Operative  Testing   CBC  CMP  BMP    PT, PTT, INR  UA  EKG    Chest X-Ray  Cardiac Clearance  [facility] Clearance     Does patient have pacemaker: [x] YES [] No Does patient have LISBET:  [] YES [x] No  Is patient diabetic?  [] YES    [x] NO    Dr. Bernardo's nurse direct line:  [phone]  Monday through Friday 8:30 am to 4:30 pm    For Dr. Bernardo's office: [phone]/ Fax: [phone]  After hours you will reach the answering service     Central [phone] [place] [phone] [place]  Medical Records:   [phone]

## 2024-03-05 ENCOUNTER — OFFICE VISIT (OUTPATIENT)
Dept: SURGERY | Facility: CLINIC | Age: 89
End: 2024-03-05
Payer: MEDICARE

## 2024-03-05 VITALS
WEIGHT: 145 LBS | DIASTOLIC BLOOD PRESSURE: 62 MMHG | BODY MASS INDEX: 22 KG/M2 | RESPIRATION RATE: 16 BRPM | OXYGEN SATURATION: 97 % | HEART RATE: 77 BPM | TEMPERATURE: 97 F | SYSTOLIC BLOOD PRESSURE: 104 MMHG

## 2024-03-05 DIAGNOSIS — D09.9 SQUAMOUS CELL CARCINOMA IN SITU: Primary | ICD-10-CM

## 2024-03-05 PROCEDURE — 99203 OFFICE O/P NEW LOW 30 MIN: CPT | Performed by: SURGERY

## 2024-03-05 NOTE — CONSULTS
[facility]    Patient Name:  Mireille Lantigua   YOB: 1934   Gender:  Male   Appt Date:  2/27/2024   Provider:  Eduin Bernardo MD   Insurance:  MEDICARE PART B ONLY     PATIENT PROVIDERS  Referring Provider: No ref. provider found   Address: No referring provider defined for this encounter.   Phone #: N/A    Primary Care Provider:Ezequiel Grullon MD   Address: [address] [place] [address]   Phone #: [phone]       CHIEF COMPLAINT  Chief Complaint   Patient presents with    Consult     Ref by          PROBLEMS  Reviewed   Patient Active Problem List   Diagnosis    Hypothyroidism    Anemia    Thrombocytopenia (HCC)    A-fib - s/p cardioversion, now in NSR    HLD (hyperlipidemia)    Shingles    History of prostatectomy    Osteoporosis with fracture    IFG (impaired fasting glucose)    Sensorineural hearing loss, bilateral    Umbilical hernia without obstruction and without gangrene    Pacemaker lead malfunction    Aortic root dilation (HCC)    Prostate cancer (HCC)    Bilateral leg edema    Dyspnea on exertion    Skin tear of left elbow without complication, initial encounter    Traumatic hematoma of head, initial encounter    Acute on chronic diastolic congestive heart failure (HCC)    Acute on chronic congestive heart failure (HCC)    Acute on chronic congestive heart failure, unspecified heart failure type (HCC)    Acute on chronic diastolic heart failure (HCC)        History of Present Illness:  I am seeing Mr. 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  Patient is an 89-year-old gentleman who is being evaluated today for the above reason.  Patient was evaluated by Dr. Day was noted to have a right shin lesion which was biopsied.  This returned to show squamous cell carcinoma, moderate differentiated of the infiltrating pattern.  The lesion  involves the deep margin.     Vital Signs:  /78   Pulse 70   Temp 97.6 °F (36.4 °C) (Temporal)   Resp (!) 70   Ht 1.727 m (5' 8\")   Wt 65.9 kg (145 lb 3.2 oz)   SpO2 98%   BMI 22.08 kg/m²      Medications Reviewed:    Current Outpatient Medications:     furosemide 40 MG Oral Tab, Take 1 tablet (40 mg total) by mouth daily., Disp: , Rfl:     Calcium Carb-Cholecalciferol (CALCIUM 600 + D OR), Take 1 tablet by mouth daily., Disp: , Rfl:     Cholecalciferol (VITAMIN D) 50 MCG (2000 UT) Oral Cap, Take 1 tablet by mouth daily., Disp: , Rfl:     pantoprazole 40 MG Oral Tab EC, Take 1 tablet (40 mg total) by mouth every morning before breakfast., Disp: , Rfl:     triamcinolone 0.1 % External Cream, Apply topically 2 (two) times daily., Disp: , Rfl:     mirtazapine 7.5 MG Oral Tab, Take 3 tablets (22.5 mg total) by mouth nightly., Disp: , Rfl:     aspirin 81 MG Oral Tab EC, Take 1 tablet (81 mg total) by mouth daily., Disp: , Rfl:     levothyroxine 112 MCG Oral Tab, Take 1 tablet (112 mcg total) by mouth daily., Disp: , Rfl:     docusate sodium 100 MG Oral Cap, Take 100 mg by mouth 2 (two) times daily., Disp: , Rfl: 0    ferrous sulfate 325 (65 FE) MG Oral Tab EC, Take 1 tablet (325 mg total) by mouth 2 (two) times daily with meals., Disp: , Rfl:     fluorouracil 5 % External Cream, , Disp: , Rfl:     Vitamin B-12 1000 MCG Oral Tab, Take 1 tablet (1,000 mcg total) by mouth daily., Disp: 90 tablet, Rfl: 3     Allergies Reviewed:  Allergies   Allergen Reactions    Codeine RASH    Penicillins RASH     Tolerated zosyn without issue        History:  Reviewed:  Past Medical History:   Diagnosis Date    Anemia 04/20/2011    Negative scopes    Aortic dilatation (HCC)     thoracic aorta - 4.6 cm -repeat CT chest in 8/2019    Arrhythmia     AFib-pacemaker July 2016 [facility] [facility]    ATRIAL FIBRILLATION     s/p cardioversion multiple times - now in NSR - Dr. Daniel Jones, and Dr. Stevenson    Bilateral renal cysts     simple     Cataract     Clostridium difficile diarrhea     7/2020    Compression fracture     T8, T12    Disorder of thyroid     Essential hypertension     H/O cardiovascular stress test     NM stress testing - unremarkable (9/2015)    Hearing impairment     bilateral hearing aides    High blood pressure     High cholesterol     History of colonic polyps     Repeat Colonscopy PRN    HLD (hyperlipidemia) 04/20/2011    HTN (hypertension)     HYPOTHYROIDISM     since 2008    IFG (impaired fasting glucose)     Osteoarthritis     Osteopenia     but with compression fractures    Pacemaker     Prostate cancer (HCC)     s/p prostatectomy -2003 - Urologist - Dr. Calvert    Pulmonary nodule     1.6 cm - stable x 3 years -no further f/up needed    Pulmonary nodule     new pulm nodule- repeat CT chest in 7/2018    Sacral fracture (HCC)     Shingles     Thrombocytopenia (HCC) 04/20/2011    Visual impairment     glasses      Reviewed:  Past Surgical History:   Procedure Laterality Date    ABLATION      a-fib ablation -> 2008    BACK SURGERY      kyphoplasty - 7/72012    CARDIAC PACEMAKER PLACEMENT      July 2016 last checked 4/2017    COLONOSCOPY N/A 9/20/2017    Procedure: COLONOSCOPY, POSSIBLE BIOPSY, POSSIBLE POLYPECTOMY 92297;  Surgeon: Jamari Phelps MD;  Location: [facility]    COLONOSCOPY,BIOPSY  9/12/2011    Performed by JAMARI PHELPS at [facility]    COLONOSCOPY,BIOPSY  10/17/2012    Procedure: COLONOSCOPY, POSSIBLE BIOPSY, POSSIBLE POLYPECTOMY 39661;  Surgeon: Jamari Phelps MD;  Location: [facility]    COLONOSCOPY,REMV LESN,SNARE  10/17/2012    Procedure: COLONOSCOPY, POSSIBLE BIOPSY, POSSIBLE POLYPECTOMY 10150;  Surgeon: Jamari Phelps MD;  Location: [facility]    GENITAL SURG PROC,MALE UNLISTED      Prostatectomy - 2003    HC KYPHOPLASTY LUMBAR      HERNIA SURGERY  4/21/17    HIP REPLACEMENT SURGERY      L hip replacement - 1994    HIP REPLACEMENT SURGERY Left 2011    LAPAROSCOPY,  SURGICAL PROSTATECTOMY, RETROPUBIC RADICAL, W/NERVE SPARING      OTHER  04/19/2018    pacer left lead revision    OTHER SURGICAL HISTORY      cardioversion multiple    OTHER SURGICAL HISTORY      HH 4/20 - L total hip replacement    OTHER SURGICAL HISTORY      s/p cardiac ablation/and pacemaker 7/20/2016    PACEMAKER MONITOR  07/2016    PATIENT DOCUMENTED NOT TO HAVE EXPERIENCED ANY OF THE FOLLOWING EVENTS  10/17/2012    Procedure: COLONOSCOPY, POSSIBLE BIOPSY, POSSIBLE POLYPECTOMY 69953;  Surgeon: Jamari Phelps MD;  Location: [facility]    PATIENT WITHOUGH PREOPERATIVE ORDER FOR IV ANTIBIOTIC SURGICAL SITE INFECTION PROPHYLAXIS.  10/17/2012    Procedure: COLONOSCOPY, POSSIBLE BIOPSY, POSSIBLE POLYPECTOMY 00059;  Surgeon: Jamari Phelps MD;  Location: [facility]    TOTAL HIP REPLACEMENT      left hip    UPPER GI ENDOSCOPY,BIOPSY  9/12/2011    Performed by JAMARI PHELPS at [facility]      Reviewed Social History:  Social History     Socioeconomic History    Marital status:    Tobacco Use    Smoking status: Former     Years: 10     Types: Cigarettes    Smokeless tobacco: Never   Vaping Use    Vaping Use: Never used   Substance and Sexual Activity    Alcohol use: Yes     Alcohol/week: 0.0 - 1.0 standard drinks of alcohol     Comment: 2 beers a month    Drug use: No   Other Topics Concern    Caffeine Concern No      Reviewed:  Family History   Problem Relation Age of Onset    Hypertension Father     Diabetes Mother     Obesity Mother     Heart Disorder Mother         MI    Heart Disease Mother         CAD    Cancer Brother         Brain cancer        Review of Systems:  Review of Systems   Constitutional:  Negative for activity change, appetite change, chills, fatigue, fever and unexpected weight change.   HENT:  Negative for mouth sores, nosebleeds and trouble swallowing.    Eyes:  Negative for discharge and redness.   Respiratory:  Negative for cough, shortness of breath and  wheezing.    Cardiovascular:  Negative for chest pain.   Gastrointestinal:  Negative for abdominal distention, abdominal pain, blood in stool, nausea and vomiting.   Genitourinary:  Negative for difficulty urinating and dysuria.   Musculoskeletal:  Negative for back pain and gait problem.   Skin:  Negative for color change.   Allergic/Immunologic: Negative for immunocompromised state.   Neurological:  Negative for speech difficulty, weakness and numbness.   Psychiatric/Behavioral:  Negative for confusion.         Physical Examination:  Physical Exam  Constitutional:       General: He is not in acute distress.     Appearance: He is well-developed. He is not diaphoretic.   HENT:      Head: Normocephalic and atraumatic.   Eyes:      General:         Right eye: No discharge.         Left eye: No discharge.      Conjunctiva/sclera: Conjunctivae normal.      Pupils: Pupils are equal, round, and reactive to light.   Neck:      Thyroid: No thyromegaly.   Cardiovascular:      Rate and Rhythm: Normal rate and regular rhythm.      Heart sounds: No murmur heard.  Pulmonary:      Effort: No respiratory distress.      Breath sounds: Normal breath sounds. No wheezing.   Abdominal:      General: Bowel sounds are normal. There is no distension.      Palpations: Abdomen is soft.      Tenderness: There is no abdominal tenderness.      Hernia: No hernia is present.   Musculoskeletal:         General: Normal range of motion.   Skin:     General: Skin is warm and dry.   Neurological:      Mental Status: He is alert and oriented to person, place, and time.          Assessment / Plan:  Squamous cell carcinoma, right lower extremity  Discussed with patient the alternatives for management including surgical resection and radiation.  I do believe that he is a candidate for resection.  I went over conduct and potential risks including and not limited to bleeding, infection.  I will ask that he is evaluated by Dr. Neil from plastic surgery to  discuss potential reconstruction options.  Will find time for surgery.  Explained that from my standpoint we may attempt resection under local anesthetic, of course I will discuss with Dr. Neil.      Eduin Bernardo MD  [facility]  [facility]  [email or web address]        Follow Up:  No follow-ups on file.       Electronically Signed by: Eduin Bernardo MD

## 2024-03-05 NOTE — PROGRESS NOTES
New Patient Consultation    This is the first visit for this 89 year old referred in anticipation of a right lower extremity wound    History of Present Illness:   The patient is a 89 year old referred by Dr. Bernardo to discuss future about options in anticipation of a right lower extremity wound.  The patient is a biopsy-proven skin cell carcinoma of his right leg.  He has been offered wide local excision by Dr. Bernardo.  Given the size and location of the anticipated wound, he was referred here by Dr. Bernardo to discuss options for surgical wound closure.    Past Medical History:      Past Medical History:   Diagnosis Date    Anemia 04/20/2011    Negative scopes    Aortic dilatation (HCC)     thoracic aorta - 4.6 cm -repeat CT chest in 8/2019    Arrhythmia     AFib-pacemaker July 2016 [facility]    ATRIAL FIBRILLATION     s/p cardioversion multiple times - now in NSR - Dr. Daniel Jones, and Dr. Stevenson    Bilateral renal cysts     simple    Cataract     Clostridium difficile diarrhea     7/2020    Compression fracture     T8, T12    Disorder of thyroid     Essential hypertension     H/O cardiovascular stress test     NM stress testing - unremarkable (9/2015)    Hearing impairment     bilateral hearing aides    High blood pressure     High cholesterol     History of colonic polyps     Repeat Colonscopy PRN    HLD (hyperlipidemia) 04/20/2011    HTN (hypertension)     HYPOTHYROIDISM     since 2008    IFG (impaired fasting glucose)     Osteoarthritis     Osteopenia     but with compression fractures    Pacemaker     Prostate cancer (HCC)     s/p prostatectomy -2003 - Urologist - Dr. Calvert    Pulmonary nodule     1.6 cm - stable x 3 years -no further f/up needed    Pulmonary nodule     new pulm nodule- repeat CT chest in 7/2018    Sacral fracture (HCC)     Shingles     Thrombocytopenia (HCC) 04/20/2011    Visual impairment     glasses         Past Surgical History:  Past Surgical History:   Procedure Laterality Date     ABLATION      a-fib ablation -> 2008    BACK SURGERY      kyphoplasty - 7/28655    CARDIAC PACEMAKER PLACEMENT      July 2016 last checked 4/2017    COLONOSCOPY N/A 9/20/2017    Procedure: COLONOSCOPY, POSSIBLE BIOPSY, POSSIBLE POLYPECTOMY 13693;  Surgeon: Jamari Phelps MD;  Location: [facility]    COLONOSCOPY,BIOPSY  9/12/2011    Performed by JAMARI PHELPS at [facility]    COLONOSCOPY,BIOPSY  10/17/2012    Procedure: COLONOSCOPY, POSSIBLE BIOPSY, POSSIBLE POLYPECTOMY 03757;  Surgeon: Jamari Phelps MD;  Location: [facility]    COLONOSCOPY,REMV LESN,SNARE  10/17/2012    Procedure: COLONOSCOPY, POSSIBLE BIOPSY, POSSIBLE POLYPECTOMY 67300;  Surgeon: Jamari Phelps MD;  Location: [facility]    GENITAL SURG PROC,MALE UNLISTED      Prostatectomy - 2003    HC KYPHOPLASTY LUMBAR      HERNIA SURGERY  4/21/17    HIP REPLACEMENT SURGERY      L hip replacement - 1994    HIP REPLACEMENT SURGERY Left 2011    LAPAROSCOPY, SURGICAL PROSTATECTOMY, RETROPUBIC RADICAL, W/NERVE SPARING      OTHER  04/19/2018    pacer left lead revision    OTHER SURGICAL HISTORY      cardioversion multiple    OTHER SURGICAL HISTORY      HH 4/20 - L total hip replacement    OTHER SURGICAL HISTORY      s/p cardiac ablation/and pacemaker 7/20/2016    PACEMAKER MONITOR  07/2016    PATIENT DOCUMENTED NOT TO HAVE EXPERIENCED ANY OF THE FOLLOWING EVENTS  10/17/2012    Procedure: COLONOSCOPY, POSSIBLE BIOPSY, POSSIBLE POLYPECTOMY 13761;  Surgeon: Jamari Phelps MD;  Location: [facility]    PATIENT WITHOUGH PREOPERATIVE ORDER FOR IV ANTIBIOTIC SURGICAL SITE INFECTION PROPHYLAXIS.  10/17/2012    Procedure: COLONOSCOPY, POSSIBLE BIOPSY, POSSIBLE POLYPECTOMY 10673;  Surgeon: Jamari Phelps MD;  Location: [facility]    TOTAL HIP REPLACEMENT      left hip    UPPER GI ENDOSCOPY,BIOPSY  9/12/2011    Performed by JAMARI PHELPS at [facility]         Medications:    Current Outpatient  Medications on File Prior to Visit   Medication Sig Dispense Refill    furosemide 40 MG Oral Tab Take 1 tablet (40 mg total) by mouth daily.      Calcium Carb-Cholecalciferol (CALCIUM 600 + D OR) Take 1 tablet by mouth daily.      Cholecalciferol (VITAMIN D) 50 MCG (2000 UT) Oral Cap Take 1 tablet by mouth daily.      pantoprazole 40 MG Oral Tab EC Take 1 tablet (40 mg total) by mouth every morning before breakfast.      triamcinolone 0.1 % External Cream Apply topically 2 (two) times daily.      mirtazapine 7.5 MG Oral Tab Take 3 tablets (22.5 mg total) by mouth nightly.      aspirin 81 MG Oral Tab EC Take 1 tablet (81 mg total) by mouth daily.      levothyroxine 112 MCG Oral Tab Take 1 tablet (112 mcg total) by mouth daily.      docusate sodium 100 MG Oral Cap Take 100 mg by mouth 2 (two) times daily.  0    ferrous sulfate 325 (65 FE) MG Oral Tab EC Take 1 tablet (325 mg total) by mouth 2 (two) times daily with meals.      fluorouracil 5 % External Cream       Vitamin B-12 1000 MCG Oral Tab Take 1 tablet (1,000 mcg total) by mouth daily. 90 tablet 3     No current facility-administered medications on file prior to visit.         Allergies:    Allergies   Allergen Reactions    Codeine RASH    Penicillins RASH     Tolerated zosyn without issue         Family History:   Family History   Problem Relation Age of Onset    Hypertension Father     Diabetes Mother     Obesity Mother     Heart Disorder Mother         MI    Heart Disease Mother         CAD    Cancer Brother         Brain cancer         Social History:  History   Alcohol Use Not Currently     Comment: 2 beers a month       History   Smoking Status    Former    Years: 10.00    Types: Cigarettes   Smokeless Tobacco    Never       History   Drug Use No           Review of Systems:    General:   The patient denies, fever, chills, night sweats, fatigue, generalized weakness, change in appetite, weight loss, or weight gain.    Endocrine:   There is no history of  poor/slow wound healing, weight loss/gain, fertility or hormone problems, cold intolerance, heat intolerance, excessive thirst, or thyroid disease.     Allergic/Immunologic:  There is no history of hives, hay fever, angioedema or anaphylaxis.    HEENT:    The patient denies ear pain, ear drainage, hearing loss, change in vision, double vision, cataracts, glaucoma, nasal congestion, nosebleed, hoarseness, sore throat, or swollen glands    Respiratory:   The patient denies shortness of breath, cough, bloody cough, phlegm, asthma, or wheezing    Cardiovascular:  The patient denies chest pain/pressure, palpitations, irregular heartbeat, high blood pressure, stroke, or leg swelling    Breasts:  Patient denies breast masses, pain, change in the breast skin, skin dimpling, nipple discharge, or rash    Gastrointestinal:   There is no history of difficulty or pain with swallowing, reflux symptoms, nausea, vomiting, dark/ bloody stools, diarrhea, constipation,  change in bowel habits, or abdominal pain.     Genitourinary:  The patient denies frequent urination, needing to get up at night to urinate, urinary hesitancy or retaining urine, painful urination, urinary incontinence, decreased urine stream, blood in the urine, or vaginal/penile discharge.    Skin:   The patient denies rash, itching, skin lesions, dry skin, change in skin color or change in moles, sunburns, or sunburns with blistering.     Hematologic/Lymphatic:  The patient denies easily bruising or bleeding, persistent swollen glands or lymph nodes, bleeding disorders, blood clots, or pulmonary embolism.     Gynecologic:  The patient denies irregular menses, pelvic pain, pain with intercourse, painful menses, or pregnancy    Musculoskeletal:  The patient denies muscle aches/pain, joint pain, stiff joints, neck pain, back pain or bone pain.    Neurologic:  There is no history of migraines or severe headaches, seizure/epilepsy, speech problems, coordination problems,  trembling/tremors, fainting/black outs, dizziness, memory problems, loss of sensation/numbness, problems walking, weakness, tingling or burning in hands/feet.     Psychiatric:  There is no history of abusive relationship, bipolar disorder, sleep disturbance, anxiety, depression or feeling of despair.    Physical Exam:    /62 (BP Location: Left arm)   Pulse 77   Temp 97.1 °F (36.2 °C) (Tympanic)   Resp 16   Wt 65.8 kg (145 lb)   SpO2 97%   BMI 22.05 kg/m²     The patient is awake, alert, and oriented.  He is a frail appearing elderly man who is examined in a wheelchair. His speech patterns and movements are normal, and affect is appropriate.    Extremities: Examination of the anterolateral right lower extremity shows an approximately 1 cm superficial eschar at the junction of the proximal medial third consistent with the biopsy known malignancy.    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    Discussion and Plan:  Options for wound closure will be dictated operative findings.  We discussed treatment options which will include wound closure with local tissue rearrangement versus split-thickness skin graft.  The nature of each procedure was discussed with the patient, wife, and their caretaker.  We discussed the risk of surgery including but not limited to bleeding, infection, scarring, delayed wound healing, partial or total graft or flap loss, and need for further surgery.  We discussed expected postoperative course including need for local wound care and compression.  Multiple questions were answered to patient satisfaction.  No guarantees as to outcome were offered.  The patient expresses understanding wishes to proceed.

## 2024-03-05 NOTE — PATIENT INSTRUCTIONS
Surgeon:         Dr. Marshall Linder                                        Tel:         [phone]                                  Fax:        [phone]    Surgery/Procedure: Right lower extremity local wound closure, possible tissue rearrangement, possible skin graft, joint with Dr. Bernardo, local anesthesia, outpatient. 30 minutes.     Dx Code: D09.9    Hospital:  [facility]: [address] [place] [address]           [phone]  [facility]: [address], [place] [address]               [phone]    1. Someone will need to drive you to and from the hospital if your procedure is outpatient.    2.Do not drink alcohol or smoke 24 hours prior to your procedure.    3. Bring a picture ID and your insurance card.    4. You will be contacted by the hospital the day before to confirm the procedure time and location.     5. Do not take any herbal supplements or blood thinners at least one week before your procedure/surgery. This includes NSAID's (aspirin, baby aspirin, Motrin, Ibuprofen, Aleve, Advil, Naproxen, etc), Plavix, fish oil, vitamin E, turmeric, CoQ10, or green tea supplements, etc. *TYLENOL or acetaminophen is ok to take*    6. If you take Coumadin, Plavix, Xarelto, or Eliquis, please contact your prescribing physician for special instructions on how long to hold. If you take insulin, contact your primary care physician for special instructions.     7. Please inform us if you start or change any medications at least one week before surgery (ex: blood thinners, weight loss medications, diabetic medications, herbal supplements, etc)    8. Does patient have diagnosis of sleep apnea?    [   ] Yes     [ X  ]  No    Consent obtained  Photos taken on 3/5/2024

## 2024-03-06 ENCOUNTER — TELEPHONE (OUTPATIENT)
Dept: SURGERY | Facility: CLINIC | Age: 89
End: 2024-03-06

## 2024-03-06 DIAGNOSIS — C44.722 SQUAMOUS CELL CARCINOMA OF SKIN OF RIGHT LOWER EXTREMITY: Primary | ICD-10-CM

## 2024-03-06 NOTE — TELEPHONE ENCOUNTER
Spoke to patients wife and offered 4/10/24 at [facility]. Patient decline due to transportation issues.

## 2024-03-07 ENCOUNTER — TELEPHONE (OUTPATIENT)
Dept: SURGERY | Facility: CLINIC | Age: 89
End: 2024-03-07

## 2024-03-13 RX ORDER — ALPRAZOLAM 0.25 MG/1
0.25 TABLET ORAL NIGHTLY PRN
COMMUNITY

## 2024-03-19 DIAGNOSIS — C44.722 SQUAMOUS CELL CARCINOMA OF SKIN OF RIGHT LOWER EXTREMITY: ICD-10-CM

## 2024-03-19 DIAGNOSIS — D09.9 SQUAMOUS CELL CARCINOMA IN SITU: Primary | ICD-10-CM

## 2024-04-10 ENCOUNTER — HOSPITAL ENCOUNTER (OUTPATIENT)
Facility: HOSPITAL | Age: 89
Setting detail: HOSPITAL OUTPATIENT SURGERY
Discharge: HOME | End: 2024-04-10
Attending: SURGERY | Admitting: SURGERY
Payer: MEDICARE

## 2024-04-10 VITALS
TEMPERATURE: 98 F | DIASTOLIC BLOOD PRESSURE: 75 MMHG | HEIGHT: 65 IN | OXYGEN SATURATION: 97 % | SYSTOLIC BLOOD PRESSURE: 131 MMHG | BODY MASS INDEX: 24.16 KG/M2 | RESPIRATION RATE: 16 BRPM | WEIGHT: 145 LBS | HEART RATE: 70 BPM

## 2024-04-10 DIAGNOSIS — C44.722 SQUAMOUS CELL CARCINOMA OF SKIN OF RIGHT LOWER EXTREMITY: ICD-10-CM

## 2024-04-10 PROCEDURE — 0HRKX73 REPLACEMENT OF RIGHT LOWER LEG SKIN WITH AUTOLOGOUS TISSUE SUBSTITUTE, FULL THICKNESS, EXTERNAL APPROACH: ICD-10-PCS | Performed by: SURGERY

## 2024-04-10 PROCEDURE — 88305 TISSUE EXAM BY PATHOLOGIST: CPT | Performed by: SURGERY

## 2024-04-10 PROCEDURE — 0HB7XZZ EXCISION OF ABDOMEN SKIN, EXTERNAL APPROACH: ICD-10-PCS | Performed by: SURGERY

## 2024-04-10 RX ORDER — ACETAMINOPHEN 500 MG
1000 TABLET ORAL EVERY 4 HOURS PRN
Qty: 20 TABLET | Refills: 0 | Status: SHIPPED | OUTPATIENT
Start: 2024-04-10

## 2024-04-10 RX ORDER — DONEPEZIL HYDROCHLORIDE 10 MG/1
10 TABLET, FILM COATED ORAL NIGHTLY
COMMUNITY
Start: 2024-02-28

## 2024-04-10 RX ORDER — ATORVASTATIN CALCIUM 10 MG/1
10 TABLET, FILM COATED ORAL NIGHTLY
COMMUNITY
Start: 2023-12-22

## 2024-04-10 RX ORDER — POTASSIUM CHLORIDE 750 MG/1
10 TABLET, FILM COATED, EXTENDED RELEASE ORAL 2 TIMES DAILY
COMMUNITY
Start: 2024-02-09

## 2024-04-10 RX ORDER — LIDOCAINE HYDROCHLORIDE AND EPINEPHRINE 10; 10 MG/ML; UG/ML
INJECTION, SOLUTION INFILTRATION; PERINEURAL AS NEEDED
Status: DISCONTINUED | OUTPATIENT
Start: 2024-04-10 | End: 2024-04-10

## 2024-04-10 NOTE — BRIEF OP NOTE
Pre-Operative Diagnosis: Squamous cell carcinoma of skin of right lower extremity [C44.722]     Post-Operative Diagnosis: * No post-op diagnosis entered *      Procedure Performed:   Wide local excision of right shin squamous cell carcinoma (Az) Right lower extremity local wound closure, possible tissue rearrangement, possible skin graft (Niyah)    Surgeons and Role:  Panel 1:     * Eduin Bernardo MD - Primary  Panel 2:     * Marshall Linder MD - Primary    Assistant(s):        Surgical Findings: as expected      Specimen: WLE     Estimated Blood Loss: No data recorded    Dictation Number:  NA    Eduin Bernardo MD  4/10/2024  8:18 AM

## 2024-04-10 NOTE — H&P
[facility]    Patient Name:  Mireille Lantigua   YOB: 1934   Gender:  Male   Appt Date:  4/10/24   Provider:  No name on file   Insurance:  MEDICARE PART A&B     PATIENT PROVIDERS  Referring Provider: No ref. provider found   Address: No referring provider defined for this encounter.   Phone #: N/A    Primary Care Provider:Ezequiel Grullon MD   Address: [unfilled]   Phone #: [phone]       CHIEF COMPLAINT  No chief complaint on file.       PROBLEMS  Reviewed   Patient Active Problem List   Diagnosis    Hypothyroidism    Anemia    Thrombocytopenia (HCC)    A-fib - s/p cardioversion, now in NSR    HLD (hyperlipidemia)    Shingles    History of prostatectomy    Osteoporosis with fracture    IFG (impaired fasting glucose)    Sensorineural hearing loss, bilateral    Umbilical hernia without obstruction and without gangrene    Pacemaker lead malfunction    Aortic root dilation (HCC)    Prostate cancer (HCC)    Bilateral leg edema    Dyspnea on exertion    Skin tear of left elbow without complication, initial encounter    Traumatic hematoma of head, initial encounter    Acute on chronic diastolic congestive heart failure (HCC)    Acute on chronic congestive heart failure (HCC)    Acute on chronic congestive heart failure, unspecified heart failure type (HCC)    Acute on chronic diastolic heart failure (HCC)    Squamous cell carcinoma in situ        History of Present Illness:  I am seeing Mr. 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  Patient is an 89-year-old gentleman who is being evaluated today for the above reason.  Patient was evaluated by Dr. Day was noted to have a right shin lesion which was biopsied.  This returned to show squamous cell carcinoma, moderate differentiated of the infiltrating pattern.  The lesion involves the deep margin.     Vital Signs:  BP  120/69 (BP Location: Left arm)   Pulse 70   Temp 98.2 °F (36.8 °C) (Temporal)   Resp 15   Ht 1.651 m (5' 5\")   Wt 65.8 kg (145 lb)   SpO2 96%   BMI 24.13 kg/m²      Medications Reviewed:  No current outpatient medications on file.     Allergies Reviewed:  Allergies   Allergen Reactions    Codeine RASH    Penicillins RASH     Tolerated zosyn without issue        History:  Reviewed:  Past Medical History:    Anemia    Negative scopes    Aortic dilatation (HCC)    thoracic aorta - 4.6 cm -repeat CT chest in 8/2019    Arrhythmia    AFib-pacemaker July 2016 [facility]    ATRIAL FIBRILLATION    s/p cardioversion multiple times - now in NSR - Dr. Daniel Jones, and Dr. Stevenson    Bilateral renal cysts    simple    Clostridium difficile diarrhea    7/2020    Compression fracture    T8, T12    Disorder of thyroid    Essential hypertension    H/O cardiovascular stress test    NM stress testing - unremarkable (9/2015)    Hearing impairment    bilateral hearing aides    High blood pressure    High cholesterol    History of colonic polyps    Repeat Colonscopy PRN    HLD (hyperlipidemia)    HTN (hypertension)    HYPOTHYROIDISM    since 2008    IFG (impaired fasting glucose)    Osteoarthritis    Osteopenia    but with compression fractures    Pacemaker    Prostate cancer (HCC)    s/p prostatectomy -2003 - Urologist - Dr. Calvert    Pulmonary nodule    1.6 cm - stable x 3 years -no further f/up needed    Pulmonary nodule    new pulm nodule- repeat CT chest in 7/2018    Sacral fracture (HCC)    Shingles    Thrombocytopenia (HCC)      Reviewed:  Past Surgical History:   Procedure Laterality Date    Ablation      a-fib ablation -> 2008    Back surgery      kyphoplasty - 7/72012    Cardiac pacemaker placement      July 2016 last checked 4/2017    Colonoscopy N/A 09/20/2017    Procedure: COLONOSCOPY, POSSIBLE BIOPSY, POSSIBLE POLYPECTOMY 85410;  Surgeon: Jamari Phelps MD;  [place] [facility]    Colonoscopy,biopsy   09/12/2011    Performed by JAMARI MANUEL at [facility]    Colonoscopy,biopsy  10/17/2012    Procedure: COLONOSCOPY, POSSIBLE BIOPSY, POSSIBLE POLYPECTOMY 75438;  Surgeon: Jamari Manuel MD;  Location: [facility]    Colonoscopy,remv lesn,snare  10/17/2012    Procedure: COLONOSCOPY, POSSIBLE BIOPSY, POSSIBLE POLYPECTOMY 21579;  Surgeon: Jamari Manuel MD;  Location: [facility]    Genital surg proc,male unlisted      Prostatectomy - 2003    Hc kyphoplasty lumbar      Hernia surgery  04/21/2017    Hip replacement surgery      L hip replacement - 1994    Laparoscopy, surgical prostatectomy, retropubic radical, w/nerve sparing      Other  04/19/2018    pacer left lead revision    Other surgical history      cardioversion multiple    Other surgical history      HH 4/20 - L total hip replacement    Other surgical history      s/p cardiac ablation/and pacemaker 7/20/2016    Pacemaker monitor  07/2016    Patient documented not to have experienced any of the following events  10/17/2012    Procedure: COLONOSCOPY, POSSIBLE BIOPSY, POSSIBLE POLYPECTOMY 85592;  Surgeon: Jamari Manuel MD;  Location: [facility]    Patient withough preoperative order for iv antibiotic surgical site infection prophylaxis.  10/17/2012    Procedure: COLONOSCOPY, POSSIBLE BIOPSY, POSSIBLE POLYPECTOMY 03171;  Surgeon: Jamari Manuel MD;  Location: [facility]    Total hip replacement      left hip    Upper gi endoscopy,biopsy  09/12/2011    Performed by JAMARI MANUEL at [facility]      Reviewed Social History:  Social History     Socioeconomic History    Marital status:    Tobacco Use    Smoking status: Former     Types: Cigarettes    Smokeless tobacco: Never    Tobacco comments:     Stopped over 40 yrs ago   Vaping Use    Vaping status: Never Used   Substance and Sexual Activity    Alcohol use: Not Currently     Alcohol/week: 0.0 - 1.0 standard drinks of alcohol     Comment:  2 beers a month    Drug use: No   Other Topics Concern    Caffeine Concern No      Reviewed:  Family History   Problem Relation Age of Onset    Hypertension Father     Diabetes Mother     Obesity Mother     Heart Disorder Mother         MI    Heart Disease Mother         CAD    Cancer Brother         Brain cancer        Review of Systems:  Review of Systems   Constitutional:  Negative for activity change, appetite change, chills, fatigue, fever and unexpected weight change.   HENT:  Negative for mouth sores, nosebleeds and trouble swallowing.    Eyes:  Negative for discharge and redness.   Respiratory:  Negative for cough, shortness of breath and wheezing.    Cardiovascular:  Negative for chest pain.   Gastrointestinal:  Negative for abdominal distention, abdominal pain, blood in stool, nausea and vomiting.   Genitourinary:  Negative for difficulty urinating and dysuria.   Musculoskeletal:  Negative for back pain and gait problem.   Skin:  Negative for color change.   Allergic/Immunologic: Negative for immunocompromised state.   Neurological:  Negative for speech difficulty, weakness and numbness.   Psychiatric/Behavioral:  Negative for confusion.         Physical Examination:  Physical Exam  Constitutional:       General: He is not in acute distress.     Appearance: He is well-developed. He is not diaphoretic.   HENT:      Head: Normocephalic and atraumatic.   Eyes:      General:         Right eye: No discharge.         Left eye: No discharge.      Conjunctiva/sclera: Conjunctivae normal.      Pupils: Pupils are equal, round, and reactive to light.   Neck:      Thyroid: No thyromegaly.   Cardiovascular:      Rate and Rhythm: Normal rate and regular rhythm.      Heart sounds: No murmur heard.  Pulmonary:      Effort: No respiratory distress.      Breath sounds: Normal breath sounds. No wheezing.   Abdominal:      General: Bowel sounds are normal. There is no distension.      Palpations: Abdomen is soft.       Tenderness: There is no abdominal tenderness.      Hernia: No hernia is present.   Musculoskeletal:         General: Normal range of motion.   Skin:     General: Skin is warm and dry.   Neurological:      Mental Status: He is alert and oriented to person, place, and time.          Assessment / Plan:  Proceed with CINTIA Bernardo MD  Complex General Surgical Oncology  [facility]  [email or web address]        Follow Up:  No follow-ups on file.       Electronically Signed by: No name on file

## 2024-04-10 NOTE — DISCHARGE INSTRUCTIONS
Plastic Surgery Home Care Instructions      We hope you were pleased with your care at [facility].  We wish you the best outcome and overall experience with your operation.  These instructions will help to minimize pain, optimize healing, and improve the likelihood of a successful result.    What To Expect  There may be some spotting of the incision lines for the next few days.  Mild bruising, mild swelling and discomfort in the surgical area is typical.   Please DO NOT apply heat or ice to the affected area    Bandages (Dressing)  Keep dressings clean and dry  Wear ace wrap at all times as tolerated. You can adjust this as needed.  Leave steri-strips in place. If these fall off on their own, that is ok. Apply antibiotic ointment daily if the steri-strips fall off.     Bathing/Showers  DO NOT get surgical area wet  No baths, swimming, or hot tubs until you receive medical permission    Over-The-Counter Medication  You can take Tylenol after surgery for pain relief as needed  You can take Aleve or ibuprofen starting 24 hours after surgery  Take as directed on the bottle    Home Medication  Resume your home medications as instructed  Do not resume herbal medications for two weeks    Diet  Resume your normal diet    Activity  No strenuous activity   You cannot return to physical exercise, sports, or gym workouts until you are allowed to participate in strenuous activity.    Driving  Do not drive, if you are taking pain medication.    Return to Work or School  You can return to work when you are not taking pain medication, if your work does not involve strenuous activity.  Contact the office, if you need a medical note.     Follow-up Appointment  Our office will call you to set up a time    Mireille   Thank you for coming to [facility] for your operation.  The nurses and the anesthesiologist try very hard to make sure you receive the best care possible.  Your trust in them is greatly appreciated.    Thanks so  Dr. Niyah chambers, CASSIE    Wide Excision Discharge Instructions  Thank you for choosing the Surgical Oncology team at [facility].        Managing Your Pain  Follow the guidelines below to help manage your pain at home:  Take your medications as directed and as needed. You may also take 1000 mg of acetaminophen (Tylenol®) every 6 hours as needed for pain.  Call our office if the medication prescribed for you does not ease your pain.  Don't drive or drink alcohol while you're taking prescription pain medications.  Keep track of when you take your pain medication. It works best 30 to 45 minutes after you take it.   Caring for Your Incision  It's normal for the skin below your incision to feel numb. This happens because some of the nerves were cut during your surgery. The numbness will go away over time.  If the area around your incision is red, puffy, or if you have any drainage from your incision, contact our office.  Showering  Do not get surgical site wet  Avoid tub baths until your surgeon says it's okay.  Activity  After the procedure, take it easy for the rest of the day.    When to Call:  Contact our office if you experience the following symptoms:  Fever of 100.4° F (38°C) or higher  Cloudy or smelly drainage from the incision site  The skin around your incision is warm/red/swollen  Sudden increase in pain or new pain  Shaking chills  Fast pulse  Shortness of breath or chest pain  Nausea or vomiting   Diarrhea  Constipation that isn't relieved in 2 days  Any new or unexplained symptoms    We will call you to set up a follow-up appointment.    Please arrive at the following location:  [facility]: [address] [place] [address]  [facility]: [address] [place] [address]  Please call us at [phone] if you are unable to make it to your appointment or if you have any questions.

## 2024-04-10 NOTE — BRIEF OP NOTE
Pre-Operative Diagnosis: Squamous cell carcinoma of skin of right lower extremity [C44.722]     Post-Operative Diagnosis: Squamous cell carcinoma of skin of right lower extremity [C44.722]      Procedure Performed:   Wide local excision of right shin squamous cell carcinoma (Az), Full thickness skin graft (Dr. Linder)    Surgeons and Role:  Panel 1:     * Eduin Bernardo MD - Primary  Panel 2:     * Marshall Linder MD - Primary    Assistant(s):        Surgical Findings: See Dictation     Specimen: Right shin wide local excision squamous cell carcinoma      Estimated Blood Loss: Blood Output: 10 mL (4/10/2024  8:32 AM)      CASSIE Flores  4/10/2024  8:43 AM

## 2024-04-10 NOTE — OPERATIVE REPORT
[facility]    PATIENT'S NAME: TAYLOR MOORE   ATTENDING PHYSICIAN: Eduin Bernardo MD   OPERATING PHYSICIAN: Marshall Linder M.D.   PATIENT ACCOUNT#:   [id number]    LOCATION:  [facility] [address] [facility] [address]  MEDICAL RECORD #:   [id number]       YOB: 1934  ADMISSION DATE:       04/10/2024      OPERATION DATE:  04/10/2024    OPERATIVE REPORT    PREOPERATIVE DIAGNOSIS:  Right lower extremity wound following wide local excision of squamous cell carcinoma.  POSTOPERATIVE DIAGNOSIS:  Right lower extremity wound following wide local excision of squamous cell carcinoma.  PROCEDURE:  Right lower extremity wound closure with full-thickness skin graft with grafted area of 12.5 sq cm.    ASSISTANT:  DONNELL Flores    ANESTHESIA:  Local.    ESTIMATED BLOOD LOSS:  Trace.    COMPLICATIONS:  None.    INDICATIONS:  The patient is an 89-year-old male referred by Dr. 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  The patient elected to undergo wide local excision.  Given the size and location of the anticipated wound, Plastic Surgery consultation was requested to assist in wound closure.    OPERATIVE TECHNIQUE:  Informed consent was obtained from the patient and family.  The risks, benefits, and alternatives were reviewed preoperatively.  They expressed understanding and wished to proceed.  The patient was marked in the preoperative holding area.  He was then taken to the operating room, properly identified, placed in supine position.  The margins of excision were marked by Dr. Bernardo.  The anticipated site of a full-thickness graft from the right groin was marked in an ellipse.  Both areas were infiltrated with a total of 16 mL of 1% lidocaine with epinephrine.  The right lower extremity was then prepped and draped sterilely.  Wide local excision was performed Dr. Bernardo.  Once this was completed, the wound was inspected.  It had a diameter 4 cm and extended to the  underlying fascia.  Hemostasis was secured with bipolar cautery.  A peripheral pursestring suture of 2-0 PDS was then placed.  Attention was then turned to the groin.  Using a scalpel, a full-thickness skin graft was harvested in the usual fashion.  The donor site was closed with 3-0 Vicryl deep dermal sutures and 4-0 Monocryl subcuticular suture.  The skin graft was defatted and fenestrated.  It was then affixed to the wound with interrupted running 5-0 chromic sutures.  A tie-over bolster of 2-0 silk over Xeroform was then placed.  Telfa, Kerlix, and an Ace wrap were applied.  The area of skin grafting totaled 12.5 sq cm.  The patient tolerated the procedure well.  There were no complications.     Dictated By Marshall Linder M.D.  d: 04/10/2024 08:54:15  t: 04/10/2024 09:26:52  [facility] [id number]  LAP/

## 2024-04-11 ENCOUNTER — TELEPHONE (OUTPATIENT)
Dept: SURGERY | Facility: CLINIC | Age: 89
End: 2024-04-11

## 2024-04-11 NOTE — TELEPHONE ENCOUNTER
Post op call made to patient. Patients wife states he is doing well. Pt state this morning he was a little uncomfortable and took some tylenol and went back to sleep. Pt's wife denies bleeding, bruising or redness to the surgical site. Patient is tolerating activity without complaints. Wound care instructions provided. Path is still pending.    Post op appointment scheduled with  on 4/30/24 at 11:30.    Patient agrees to call if any problems.

## 2024-04-11 NOTE — OPERATIVE REPORT
Operation 4/10/2024    Preoperative diagnosis:  1.  Squamous cell carcinoma, right shin    Operations performed:  1.  Wide local excision, right shin squamous cell carcinoma, specimen measured 2.5 x 2.5 cm    Postoperative diagnosis:  1.  Same    Operating Surgeon:  1.  Eduin Bernardo MD    Co-surgeon:  1.  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  Risks and alternatives were reviewed with the patient, intent was to pursue palliative resection.  He presents for definitive surgical management.    Details of the operation:  Patient was brought to the operating room laid supine operating table.  The right lower extremity was prepped and draped in standard sterile manner.  Time was completed verify the patient's name, procedure be performed and laterality.  Everybody in the room was in agreement.  I marked out 1 cm from the edges of the tumor.  A 15 blade was used to incise the skin.  This was deepened through subcutaneous tissue with electrocautery.  The dissection was carried towards the fascia.  The specimen was gradually lifted off its underlying attachments with electrocautery.  It was oriented and sent off for final pathological analysis.  Hemostasis was excellent.  At this point, Dr. Linder completed reconstruction for details please refer to separate note by him.    Eduin Bernardo MD  Complex General Surgical Oncology  [facility]  [email or web address]

## 2024-04-15 ENCOUNTER — OFFICE VISIT (OUTPATIENT)
Dept: SURGERY | Facility: CLINIC | Age: 89
End: 2024-04-15
Payer: MEDICARE

## 2024-04-15 DIAGNOSIS — D09.9 SQUAMOUS CELL CARCINOMA IN SITU: Primary | ICD-10-CM

## 2024-04-15 PROCEDURE — 99024 POSTOP FOLLOW-UP VISIT: CPT

## 2024-04-15 NOTE — PROGRESS NOTES
Mireille Lantigua is a 89 year old male who presents today for a follow-up after Wide local excision, right shin squamous cell carcinoma, specimen measured 2.5 x 2.5 cm (Dr. Bernardo) and Right lower extremity wound closure with full-thickness skin graft with grafted area of 12.5 sq cm. (Dr. Linder on 4/10/2024.    He denies fever and chills. He denies nausea, vomiting, diarrhea or constipation.   His pain is controlled.      Physical Exam     Right Groin: Skin graft donor site is clean, dry, intact.  There is no evidence of hematoma or seroma.    Right Shin: Graft site with approximately 20% take.  No evidence of hematoma or seroma.  No surrounding erythema.  Xeroform bolster in place.    There were no vitals filed for this visit.      Assessment and Plan     Mireille Lantigua is doing well s/p Wide local excision, right shin squamous cell carcinoma, specimen measured 2.5 x 2.5 cm (Dr. Bernardo) and Right lower extremity wound closure with full-thickness skin graft with grafted area of 12.5 sq cm. (Dr. Linder on 4/10/2024.    Is here today for wound check and bolster removal.  The right groin incision was redressed with new Steri-Strips.    The right anterior shin Xeroform bolster was removed today.  The patient tolerated this well.  The skin graft site was redressed with Neosporin, Xeroform, Telfa, Kerlix roll, Ace wrap.  The patient's home health nurse will change this dressing every other day.  Dressing care instructions were reviewed with the home health nurse and supplies were given.    Compression and activity guidelines were reviewed with the patient and his wife and home health nurse.  The home health nurse was instructed to contact the office with any change in the wound or redness or swelling.  The patient is following up with Dr. Bernardo and Dr. Linder on 4-30.  Patient was instructed to contact the office with any questions or concerns.    Questions were answered. Patient understands.     Kathy Rios  APRN  4/15/2024  11:28 AM

## 2024-04-26 ENCOUNTER — OFFICE VISIT (OUTPATIENT)
Dept: SURGERY | Facility: CLINIC | Age: 89
End: 2024-04-26
Payer: MEDICARE

## 2024-04-26 VITALS
RESPIRATION RATE: 16 BRPM | TEMPERATURE: 98 F | OXYGEN SATURATION: 94 % | SYSTOLIC BLOOD PRESSURE: 107 MMHG | HEART RATE: 76 BPM | DIASTOLIC BLOOD PRESSURE: 67 MMHG

## 2024-04-26 DIAGNOSIS — C44.722 SQUAMOUS CELL CARCINOMA OF SKIN OF RIGHT LOWER EXTREMITY: Primary | ICD-10-CM

## 2024-04-26 PROCEDURE — 99024 POSTOP FOLLOW-UP VISIT: CPT | Performed by: PHYSICIAN ASSISTANT

## 2024-04-26 NOTE — PROGRESS NOTES
[facility]    Patient Name:  Mireille Lantigua   YOB: 1934   Gender:  Male   Appt Date:  4/26/2024   Provider:  Sandy Albert PA-C   Insurance:  MEDICARE PART B ONLY     PATIENT PROVIDERS  Referring Provider: No ref. provider found   Address: No referring provider defined for this encounter.   Phone #: N/A    Primary Care Provider:Ezequiel Grullon MD   Address: [address] [place] [address]   Phone #: [phone]       CHIEF COMPLAINT  Chief Complaint   Patient presents with    Post-Op        PROBLEMS  Reviewed   Patient Active Problem List   Diagnosis    Hypothyroidism    Anemia    Thrombocytopenia (HCC)    A-fib - s/p cardioversion, now in NSR    HLD (hyperlipidemia)    Shingles    History of prostatectomy    Osteoporosis with fracture    IFG (impaired fasting glucose)    Sensorineural hearing loss, bilateral    Umbilical hernia without obstruction and without gangrene    Pacemaker lead malfunction    Aortic root dilation (HCC)    Prostate cancer (HCC)    Bilateral leg edema    Dyspnea on exertion    Skin tear of left elbow without complication, initial encounter    Traumatic hematoma of head, initial encounter    Acute on chronic diastolic congestive heart failure (HCC)    Acute on chronic congestive heart failure (HCC)    Acute on chronic congestive heart failure, unspecified heart failure type (HCC)    Acute on chronic diastolic heart failure (HCC)    Squamous cell carcinoma in situ        History of Present Illness:  Mireille Lantigua today is an 89-year-old gentleman following up after surgical management of 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    Patient was evaluated by Dr. Day was noted to have a right shin lesion which was biopsied.  This returned to show squamous cell carcinoma, moderate differentiated of the infiltrating pattern.  The lesion involves the deep margin. On 4/10/2024, he underwent wide local excision and  reconstruction wit full thickness skin graft by Dr. Linder. Pathology showed no residual malignancy.    He returns for a post operative visit. He is being followed closely with Dr. Linder's team and dressing is changed every other day by home nurse. Denies pain, discharge, redness or other concerning findings.     Vital Signs:  /67 (BP Location: Left arm, Patient Position: Sitting, Cuff Size: adult)   Pulse 76   Temp 97.5 °F (36.4 °C) (Oral)   Resp 16   SpO2 94%      Medications Reviewed:    Current Outpatient Medications:     atorvastatin 10 MG Oral Tab, Take 1 tablet (10 mg total) by mouth nightly., Disp: , Rfl:     donepezil 10 MG Oral Tab, Take 1 tablet (10 mg total) by mouth nightly., Disp: , Rfl:     Potassium Chloride ER 10 MEQ Oral Tab CR, Take 1 tablet (10 mEq total) by mouth 2 (two) times daily., Disp: , Rfl:     ACETAMINOPHEN 500 MG Oral Tab, Take 2 tablets (1,000 mg total) by mouth every 4 (four) hours as needed for Pain., Disp: 20 tablet, Rfl: 0    ALPRAZolam 0.25 MG Oral Tab, Take 1 tablet (0.25 mg total) by mouth nightly as needed for Sleep., Disp: , Rfl:     furosemide 40 MG Oral Tab, Take 1 tablet (40 mg total) by mouth daily., Disp: , Rfl:     Calcium Carb-Cholecalciferol (CALCIUM 600 + D OR), Take 1 tablet by mouth daily., Disp: , Rfl:     Cholecalciferol (VITAMIN D) 50 MCG (2000 UT) Oral Cap, Take 1 tablet by mouth daily., Disp: , Rfl:     pantoprazole 40 MG Oral Tab EC, Take 1 tablet (40 mg total) by mouth every morning before breakfast., Disp: , Rfl:     triamcinolone 0.1 % External Cream, Apply topically 2 (two) times daily., Disp: , Rfl:     mirtazapine 7.5 MG Oral Tab, Take 1 tablet (7.5 mg total) by mouth nightly., Disp: , Rfl:     aspirin 81 MG Oral Tab EC, Take 1 tablet (81 mg total) by mouth daily., Disp: , Rfl:     levothyroxine 112 MCG Oral Tab, Take 1 tablet (112 mcg total) by mouth daily., Disp: , Rfl:     docusate sodium 100 MG Oral Cap, Take 100 mg by mouth 2 (two) times  daily., Disp: , Rfl: 0    ferrous sulfate 325 (65 FE) MG Oral Tab EC, Take 1 tablet (325 mg total) by mouth 2 (two) times daily with meals., Disp: , Rfl:     fluorouracil 5 % External Cream, , Disp: , Rfl:     Vitamin B-12 1000 MCG Oral Tab, Take 1 tablet (1,000 mcg total) by mouth daily., Disp: 90 tablet, Rfl: 3     Allergies Reviewed:  Allergies   Allergen Reactions    Codeine RASH    Penicillins RASH     Tolerated zosyn without issue        History:  Reviewed:  Past Medical History:    Anemia    Negative scopes    Aortic dilatation (HCC)    thoracic aorta - 4.6 cm -repeat CT chest in 8/2019    Arrhythmia    AFib-pacemaker July 2016 [facility]    ATRIAL FIBRILLATION    s/p cardioversion multiple times - now in NSR - Dr. Daniel Jones, and Dr. Stevenson    Bilateral renal cysts    simple    Clostridium difficile diarrhea    7/2020    Compression fracture    T8, T12    Disorder of thyroid    Essential hypertension    H/O cardiovascular stress test    NM stress testing - unremarkable (9/2015)    Hearing impairment    bilateral hearing aides    High blood pressure    High cholesterol    History of colonic polyps    Repeat Colonscopy PRN    HLD (hyperlipidemia)    HTN (hypertension)    HYPOTHYROIDISM    since 2008    IFG (impaired fasting glucose)    Osteoarthritis    Osteopenia    but with compression fractures    Pacemaker    Prostate cancer (HCC)    s/p prostatectomy -2003 - Urologist - Dr. Calvert    Pulmonary nodule    1.6 cm - stable x 3 years -no further f/up needed    Pulmonary nodule    new pulm nodule- repeat CT chest in 7/2018    Sacral fracture (HCC)    Shingles    Thrombocytopenia (HCC)      Reviewed:  Past Surgical History:   Procedure Laterality Date    Ablation      a-fib ablation -> 2008    Back surgery      kyphoplasty - 7/72012    Cardiac pacemaker placement      July 2016 last checked 4/2017    Colonoscopy N/A 09/20/2017    Procedure: COLONOSCOPY, POSSIBLE BIOPSY, POSSIBLE POLYPECTOMY 91472;  Surgeon: Mattie  Jamari VALDIVIA MD;  Location: [facility]    Colonoscopy,biopsy  09/12/2011    Performed by JAMARI PHELPS at [facility]    Colonoscopy,biopsy  10/17/2012    Procedure: COLONOSCOPY, POSSIBLE BIOPSY, POSSIBLE POLYPECTOMY 58653;  Surgeon: Jamari Phelps MD;  Location: [facility]    Colonoscopy,remv lesn,snare  10/17/2012    Procedure: COLONOSCOPY, POSSIBLE BIOPSY, POSSIBLE POLYPECTOMY 48251;  Surgeon: Jamari Phelps MD;  Location: [facility]    Genital surg proc,male unlisted      Prostatectomy - 2003    Hc kyphoplasty lumbar      Hernia surgery  04/21/2017    Hip replacement surgery      L hip replacement - 1994    Laparoscopy, surgical prostatectomy, retropubic radical, w/nerve sparing      Other  04/19/2018    pacer left lead revision    Other surgical history      cardioversion multiple    Other surgical history      HH 4/20 - L total hip replacement    Other surgical history      s/p cardiac ablation/and pacemaker 7/20/2016    Pacemaker monitor  07/2016    Patient documented not to have experienced any of the following events  10/17/2012    Procedure: COLONOSCOPY, POSSIBLE BIOPSY, POSSIBLE POLYPECTOMY 74714;  Surgeon: Jamari Phelps MD;  Location: [facility]    Patient withough preoperative order for iv antibiotic surgical site infection prophylaxis.  10/17/2012    Procedure: COLONOSCOPY, POSSIBLE BIOPSY, POSSIBLE POLYPECTOMY 08166;  Surgeon: Jamari Phelps MD;  Location: [facility]    Total hip replacement      left hip    Upper gi endoscopy,biopsy  09/12/2011    Performed by JAMARI PHELPS at [facility]      Reviewed Social History:  Social History     Socioeconomic History    Marital status:    Tobacco Use    Smoking status: Former     Types: Cigarettes    Smokeless tobacco: Never    Tobacco comments:     Stopped over 40 yrs ago   Vaping Use    Vaping status: Never Used   Substance and Sexual Activity    Alcohol use: Not  Currently     Alcohol/week: 0.0 - 1.0 standard drinks of alcohol     Comment: 2 beers a month    Drug use: No   Other Topics Concern    Caffeine Concern No      Reviewed:  Family History   Problem Relation Age of Onset    Hypertension Father     Diabetes Mother     Obesity Mother     Heart Disorder Mother         MI    Heart Disease Mother         CAD    Cancer Brother         Brain cancer        Review of Systems:  Review of Systems   Constitutional: Negative.    Respiratory: Negative.     Cardiovascular: Negative.    Gastrointestinal: Negative.    Skin: Negative.    Neurological: Negative.         Physical Examination:  Physical Exam  Constitutional:       General: He is not in acute distress.     Appearance: He is well-developed.   HENT:      Head: Normocephalic and atraumatic.   Eyes:      General: No scleral icterus.  Pulmonary:      Effort: Pulmonary effort is normal. No respiratory distress.   Abdominal:      General: There is no distension.      Palpations: Abdomen is soft. There is no mass.   Musculoskeletal:         General: Normal range of motion.      Cervical back: Normal range of motion and neck supple.   Skin:     General: Skin is warm and dry.          Neurological:      Mental Status: He is alert and oriented to person, place, and time.          Assessment / Plan:  Squamous cell carcinoma, right lower extremity    Doing well from a postoperative standpoint.  Dressings changed today in clinic (neosporin, xeroform, telfa, kerlix, ace wrap)  Discussed pathology results.  Continued wound care with Dr. Linder.  Recommend continued skin exams with Dr. Day.  All questions answered to the best of my ability  He may follow up with our team on an as needed basis.    Sandy Albert PA-C  Department of Surgical Oncology  [facility]  [address] [place]  [address]  [place] [address] [facility] Shellie Page [address] [place] [id number]  T: [phone]  F: [phone]          Follow  Up:  No follow-ups on file.       Electronically Signed by: Eduin Bernardo MD

## 2024-04-30 ENCOUNTER — OFFICE VISIT (OUTPATIENT)
Dept: SURGERY | Facility: CLINIC | Age: 89
End: 2024-04-30
Payer: MEDICARE

## 2024-04-30 DIAGNOSIS — Z94.5 STATUS POST FULL THICKNESS SKIN GRAFT: Primary | ICD-10-CM

## 2024-04-30 PROCEDURE — 99024 POSTOP FOLLOW-UP VISIT: CPT | Performed by: SURGERY

## 2024-04-30 NOTE — PROGRESS NOTES
Mireille Lantigua is a 89 year old male who presents today for a follow-up after undergoing wide local excision of a squamous cell carcinoma of his right lower extremity and full-thickness skin graft.    Physical Examination:  Right lower extremity: Skin graft shows good take.  Right groin donor site is clean dry and intact.    Assessment and Plan:  Patient is doing well.  We discussed continuing local wound care with topical antibiotic ointment for the next 10 to 14 days.  The patient may resume regular activity as tolerated.  He may follow-up in apparent basis.  The plan was reviewed with the patient and family and questions were answered.

## 2024-05-06 ENCOUNTER — TELEPHONE (OUTPATIENT)
Dept: SURGERY | Facility: CLINIC | Age: 89
End: 2024-05-06

## 2024-06-07 NOTE — PROGRESS NOTES
Mendez Augustine has attended 8 visits in Physical Therapy.      Dx:  Unilateral primary osteoarthritis; right knee     Authorized # of Visits:  10 [facility] [place] [place])         Next MD visit: none scheduled  Fall Risk: standard         Precautions: anticoagulants, pa Pre-op prep paused throughout for MD assessment x3.    bars x 6 laps  -Standing hip ext RTB 2 x 10 r/l  B heel raises 2 x 10   -Bridge with ball squeeze 5\" hold 2 x 15   -SAQ with towel squeeze 10\" x 15 with 2.5# r/l   -Clam r/l x 15        TE:  SAQ with 3# 10\"x15 R/L   Bridge with ball squeeze 2x15, 5\" ho

## 2024-08-01 ENCOUNTER — OFFICE VISIT (OUTPATIENT)
Dept: OTOLARYNGOLOGY | Facility: CLINIC | Age: 89
End: 2024-08-01

## 2024-08-01 DIAGNOSIS — H61.23 BILATERAL IMPACTED CERUMEN: Primary | ICD-10-CM

## 2024-08-01 PROCEDURE — 69210 REMOVE IMPACTED EAR WAX UNI: CPT | Performed by: OTOLARYNGOLOGY

## 2024-08-01 NOTE — PROGRESS NOTES
Mireille Lantigua is a 89 year old male.    Chief Complaint   Patient presents with    Ear Wax     Ear cleaning        HISTORY OF PRESENT ILLNESS    Patient presents for cerumen removal. No other complaints or concerns at this time    Social History     Socioeconomic History    Marital status:    Tobacco Use    Smoking status: Former     Types: Cigarettes    Smokeless tobacco: Never    Tobacco comments:     Stopped over 40 yrs ago   Vaping Use    Vaping status: Never Used   Substance and Sexual Activity    Alcohol use: Not Currently     Alcohol/week: 0.0 - 1.0 standard drinks of alcohol     Comment: 2 beers a month    Drug use: No   Other Topics Concern    Caffeine Concern No       Family History   Problem Relation Age of Onset    Hypertension Father     Diabetes Mother     Obesity Mother     Heart Disorder Mother         MI    Heart Disease Mother         CAD    Cancer Brother         Brain cancer       Past Medical History:    Anemia    Negative scopes    Aortic dilatation (HCC)    thoracic aorta - 4.6 cm -repeat CT chest in 8/2019    Arrhythmia    AFib-pacemaker July 2016 [facility]    ATRIAL FIBRILLATION    s/p cardioversion multiple times - now in NSR - Dr. Daniel Jones, and Dr. Stevenson    Bilateral renal cysts    simple    Clostridium difficile diarrhea    7/2020    Compression fracture    T8, T12    Disorder of thyroid    Essential hypertension    H/O cardiovascular stress test    NM stress testing - unremarkable (9/2015)    Hearing impairment    bilateral hearing aides    High blood pressure    High cholesterol    History of colonic polyps    Repeat Colonscopy PRN    HLD (hyperlipidemia)    HTN (hypertension)    HYPOTHYROIDISM    since 2008    IFG (impaired fasting glucose)    Osteoarthritis    Osteopenia    but with compression fractures    Pacemaker    Prostate cancer (HCC)    s/p prostatectomy -2003 - Urologist - Dr. Calvert    Pulmonary nodule    1.6 cm - stable x 3 years -no further f/up needed    Pulmonary  nodule    new pulm nodule- repeat CT chest in 7/2018    Sacral fracture (HCC)    Shingles    Thrombocytopenia (HCC)       Past Surgical History:   Procedure Laterality Date    Ablation      a-fib ablation -> 2008    Back surgery      kyphoplasty - 7/72012    Cardiac pacemaker placement      July 2016 last checked 4/2017    Colonoscopy N/A 09/20/2017    Procedure: COLONOSCOPY, POSSIBLE BIOPSY, POSSIBLE POLYPECTOMY 10802;  Surgeon: Jamari Phelps MD;  Location: [facility]    Colonoscopy,biopsy  09/12/2011    Performed by JAMARI PHELPS at [facility]    Colonoscopy,biopsy  10/17/2012    Procedure: COLONOSCOPY, POSSIBLE BIOPSY, POSSIBLE POLYPECTOMY 69458;  Surgeon: Jamari Phelps MD;  Location: [facility]    Colonoscopy,remv lesn,snare  10/17/2012    Procedure: COLONOSCOPY, POSSIBLE BIOPSY, POSSIBLE POLYPECTOMY 17214;  Surgeon: Jamari Phelps MD;  Location: [facility]    Genital surg proc,male unlisted      Prostatectomy - 2003    Hc kyphoplasty lumbar      Hernia surgery  04/21/2017    Hip replacement surgery      L hip replacement - 1994    Laparoscopy, surgical prostatectomy, retropubic radical, w/nerve sparing      Other  04/19/2018    pacer left lead revision    Other surgical history      cardioversion multiple    Other surgical history      HH 4/20 - L total hip replacement    Other surgical history      s/p cardiac ablation/and pacemaker 7/20/2016    Pacemaker monitor  07/2016    Patient documented not to have experienced any of the following events  10/17/2012    Procedure: COLONOSCOPY, POSSIBLE BIOPSY, POSSIBLE POLYPECTOMY 38574;  Surgeon: Jamari Phelps MD;  Location: [facility]    Patient withough preoperative order for iv antibiotic surgical site infection prophylaxis.  10/17/2012    Procedure: COLONOSCOPY, POSSIBLE BIOPSY, POSSIBLE POLYPECTOMY 28175;  Surgeon: Jamari Phelps MD;  Location: [facility]    Total hip replacement      left  hip    Upper gi endoscopy,biopsy  09/12/2011    Performed by LUDA MANUEL at [facility] [place]       REVIEW OF SYSTEMS    System Neg/Pos Details   Constitutional Negative Fatigue, fever and weight loss.   ENMT Negative Drooling.   Eyes Negative Blurred vision and vision changes.   Respiratory Negative Dyspnea and wheezing.   Cardio Negative Chest pain, irregular heartbeat/palpitations and syncope.   GI Negative Abdominal pain and diarrhea.   Endocrine Negative Cold intolerance and heat intolerance.   Neuro Negative Tremors.   Psych Negative Anxiety and depression.   Integumentary Negative Frequent skin infections, pigment change and rash.   Hema/Lymph Negative Easy bleeding and easy bruising.           PHYSICAL EXAM    There were no vitals taken for this visit.       Constitutional Normal Overall appearance - Normal.        Neck Exam Normal Inspection - Normal. Palpation - Normal. Parotid gland - Normal. Thyroid gland - Normal.             Head/Face Normal Facial features - Normal. Eyebrows - Normal. Skull - Normal.             Ears Normal Inspection - Right: Normal, Left: Normal. Canal - Right: Normal, Left: Normal. TM - Right: Normal, Left: Normal.   Skin Normal Inspection - Normal.                              Canals:  Right: Canal reveals cerumen impaction,   Left: Canal reveals cerumen impaction,     Tympanic Membranes:  Right: Normal tympanic membrane.   Left: Normal tympanic membrane.     TM Visualized Method:   Right TM examined via otomicroscopy.    Left TM examined via otomicroscopy.      PROCEDURE:    Removal of cerumen impaction   The cerumen impaction was completely removed using microscopy.   Removal was completed by using acurette and/or suction.   Comments: Return to clinic as needed.  Avoid q-tips, water precautions and use over the counter wax remedies as needed.      Current Outpatient Medications:     atorvastatin 10 MG Oral Tab, Take 1 tablet (10 mg total) by mouth nightly., Disp: , Rfl:      donepezil 10 MG Oral Tab, Take 1 tablet (10 mg total) by mouth nightly., Disp: , Rfl:     Potassium Chloride ER 10 MEQ Oral Tab CR, Take 1 tablet (10 mEq total) by mouth 2 (two) times daily., Disp: , Rfl:     ACETAMINOPHEN 500 MG Oral Tab, Take 2 tablets (1,000 mg total) by mouth every 4 (four) hours as needed for Pain., Disp: 20 tablet, Rfl: 0    ALPRAZolam 0.25 MG Oral Tab, Take 1 tablet (0.25 mg total) by mouth nightly as needed for Sleep., Disp: , Rfl:     furosemide 40 MG Oral Tab, Take 1 tablet (40 mg total) by mouth daily., Disp: , Rfl:     Calcium Carb-Cholecalciferol (CALCIUM 600 + D OR), Take 1 tablet by mouth daily., Disp: , Rfl:     Cholecalciferol (VITAMIN D) 50 MCG (2000 UT) Oral Cap, Take 1 tablet by mouth daily., Disp: , Rfl:     pantoprazole 40 MG Oral Tab EC, Take 1 tablet (40 mg total) by mouth every morning before breakfast., Disp: , Rfl:     triamcinolone 0.1 % External Cream, Apply topically 2 (two) times daily., Disp: , Rfl:     mirtazapine 7.5 MG Oral Tab, Take 1 tablet (7.5 mg total) by mouth nightly., Disp: , Rfl:     aspirin 81 MG Oral Tab EC, Take 1 tablet (81 mg total) by mouth daily., Disp: , Rfl:     levothyroxine 112 MCG Oral Tab, Take 1 tablet (112 mcg total) by mouth daily., Disp: , Rfl:     docusate sodium 100 MG Oral Cap, Take 100 mg by mouth 2 (two) times daily., Disp: , Rfl: 0    ferrous sulfate 325 (65 FE) MG Oral Tab EC, Take 1 tablet (325 mg total) by mouth 2 (two) times daily with meals., Disp: , Rfl:     fluorouracil 5 % External Cream, , Disp: , Rfl:     Vitamin B-12 1000 MCG Oral Tab, Take 1 tablet (1,000 mcg total) by mouth daily., Disp: 90 tablet, Rfl: 3  ASSESSMENT AND PLAN    1. Bilateral impacted cerumen        All cerumen was removed using microscopy. I have asked the patient to return to see me as needed for repeat cerumen removal in the future.      Kaiden Wagner MD    8/1/2024    11:50 AM

## 2024-09-30 ENCOUNTER — HOSPITAL ENCOUNTER (INPATIENT)
Facility: HOSPITAL | Age: 89
LOS: 2 days | Discharge: HOSPICE | End: 2024-10-03
Attending: STUDENT IN AN ORGANIZED HEALTH CARE EDUCATION/TRAINING PROGRAM | Admitting: HOSPITALIST
Payer: MEDICARE

## 2024-09-30 ENCOUNTER — APPOINTMENT (OUTPATIENT)
Dept: GENERAL RADIOLOGY | Facility: HOSPITAL | Age: 89
End: 2024-09-30
Attending: STUDENT IN AN ORGANIZED HEALTH CARE EDUCATION/TRAINING PROGRAM
Payer: MEDICARE

## 2024-09-30 ENCOUNTER — HOSPITAL ENCOUNTER (INPATIENT)
Facility: HOSPITAL | Age: 89
LOS: 2 days | Discharge: HOME | End: 2024-10-03
Attending: STUDENT IN AN ORGANIZED HEALTH CARE EDUCATION/TRAINING PROGRAM | Admitting: HOSPITALIST
Payer: MEDICARE

## 2024-09-30 DIAGNOSIS — J18.9 COMMUNITY ACQUIRED PNEUMONIA, UNSPECIFIED LATERALITY: ICD-10-CM

## 2024-09-30 DIAGNOSIS — I50.9 ACUTE ON CHRONIC CONGESTIVE HEART FAILURE, UNSPECIFIED HEART FAILURE TYPE (HCC): Primary | ICD-10-CM

## 2024-09-30 DIAGNOSIS — S51.812A SKIN TEAR OF LEFT FOREARM WITHOUT COMPLICATION, INITIAL ENCOUNTER: ICD-10-CM

## 2024-09-30 LAB
ALBUMIN SERPL-MCNC: 3.9 G/DL (ref 3.2–4.8)
ALP LIVER SERPL-CCNC: 63 U/L
ALT SERPL-CCNC: 8 U/L
ANION GAP SERPL CALC-SCNC: 7 MMOL/L (ref 0–18)
AST SERPL-CCNC: 28 U/L (ref ?–34)
BASOPHILS # BLD AUTO: 0.01 X10(3) UL (ref 0–0.2)
BASOPHILS NFR BLD AUTO: 0.1 %
BILIRUB DIRECT SERPL-MCNC: 0.4 MG/DL (ref ?–0.3)
BILIRUB SERPL-MCNC: 1 MG/DL (ref 0.2–0.9)
BUN BLD-MCNC: 28 MG/DL (ref 9–23)
BUN/CREAT SERPL: 23 (ref 10–20)
CALCIUM BLD-MCNC: 9.2 MG/DL (ref 8.7–10.4)
CHLORIDE SERPL-SCNC: 109 MMOL/L (ref 98–112)
CHOLEST SERPL-MCNC: 105 MG/DL (ref ?–200)
CO2 SERPL-SCNC: 21 MMOL/L (ref 21–32)
CREAT BLD-MCNC: 1.22 MG/DL
DEPRECATED RDW RBC AUTO: 55.8 FL (ref 35.1–46.3)
EGFRCR SERPLBLD CKD-EPI 2021: 56 ML/MIN/1.73M2 (ref 60–?)
EOSINOPHIL # BLD AUTO: 0.07 X10(3) UL (ref 0–0.7)
EOSINOPHIL NFR BLD AUTO: 0.9 %
ERYTHROCYTE [DISTWIDTH] IN BLOOD BY AUTOMATED COUNT: 15.7 % (ref 11–15)
GLUCOSE BLD-MCNC: 116 MG/DL (ref 70–99)
HCT VFR BLD AUTO: 35.7 %
HDLC SERPL-MCNC: 39 MG/DL (ref 40–59)
HGB BLD-MCNC: 11.6 G/DL
IMM GRANULOCYTES # BLD AUTO: 0.04 X10(3) UL (ref 0–1)
IMM GRANULOCYTES NFR BLD: 0.5 %
LDLC SERPL CALC-MCNC: 50 MG/DL (ref ?–100)
LYMPHOCYTES # BLD AUTO: 1 X10(3) UL (ref 1–4)
LYMPHOCYTES NFR BLD AUTO: 13.4 %
MCH RBC QN AUTO: 31.3 PG (ref 26–34)
MCHC RBC AUTO-ENTMCNC: 32.5 G/DL (ref 31–37)
MCV RBC AUTO: 96.2 FL
MONOCYTES # BLD AUTO: 1.1 X10(3) UL (ref 0.1–1)
MONOCYTES NFR BLD AUTO: 14.8 %
NEUTROPHILS # BLD AUTO: 5.22 X10 (3) UL (ref 1.5–7.7)
NEUTROPHILS # BLD AUTO: 5.22 X10(3) UL (ref 1.5–7.7)
NEUTROPHILS NFR BLD AUTO: 70.3 %
NONHDLC SERPL-MCNC: 66 MG/DL (ref ?–130)
NT-PROBNP SERPL-MCNC: 9830 PG/ML (ref ?–450)
OSMOLALITY SERPL CALC.SUM OF ELEC: 290 MOSM/KG (ref 275–295)
PLATELET # BLD AUTO: 135 10(3)UL (ref 150–450)
PLATELETS.RETICULATED NFR BLD AUTO: 6.2 % (ref 0–7)
POTASSIUM SERPL-SCNC: 5.5 MMOL/L (ref 3.5–5.1)
PROCALCITONIN SERPL-MCNC: 0.54 NG/ML (ref ?–0.05)
PROT SERPL-MCNC: 6.9 G/DL (ref 5.7–8.2)
RBC # BLD AUTO: 3.71 X10(6)UL
SODIUM SERPL-SCNC: 137 MMOL/L (ref 136–145)
TRIGL SERPL-MCNC: 76 MG/DL (ref 30–149)
TROPONIN I SERPL HS-MCNC: 59 NG/L
VLDLC SERPL CALC-MCNC: 11 MG/DL (ref 0–30)
WBC # BLD AUTO: 7.4 X10(3) UL (ref 4–11)

## 2024-09-30 PROCEDURE — 71045 X-RAY EXAM CHEST 1 VIEW: CPT | Performed by: STUDENT IN AN ORGANIZED HEALTH CARE EDUCATION/TRAINING PROGRAM

## 2024-09-30 RX ORDER — FUROSEMIDE 10 MG/ML
20 INJECTION INTRAMUSCULAR; INTRAVENOUS ONCE
Status: COMPLETED | OUTPATIENT
Start: 2024-09-30 | End: 2024-09-30

## 2024-09-30 RX ORDER — AZITHROMYCIN 250 MG/1
500 TABLET, FILM COATED ORAL ONCE
Status: COMPLETED | OUTPATIENT
Start: 2024-09-30 | End: 2024-09-30

## 2024-10-01 ENCOUNTER — APPOINTMENT (OUTPATIENT)
Dept: CV DIAGNOSTICS | Facility: HOSPITAL | Age: 89
End: 2024-10-01
Payer: MEDICARE

## 2024-10-01 ENCOUNTER — APPOINTMENT (OUTPATIENT)
Dept: ULTRASOUND IMAGING | Facility: HOSPITAL | Age: 89
End: 2024-10-01
Attending: NURSE PRACTITIONER
Payer: MEDICARE

## 2024-10-01 PROBLEM — J18.9 COMMUNITY ACQUIRED PNEUMONIA, UNSPECIFIED LATERALITY: Status: ACTIVE | Noted: 2024-10-01

## 2024-10-01 PROBLEM — S51.812A SKIN TEAR OF LEFT FOREARM WITHOUT COMPLICATION, INITIAL ENCOUNTER: Status: ACTIVE | Noted: 2024-10-01

## 2024-10-01 LAB
ANION GAP SERPL CALC-SCNC: 9 MMOL/L (ref 0–18)
ATRIAL RATE: 65 BPM
BUN BLD-MCNC: 26 MG/DL (ref 9–23)
BUN/CREAT SERPL: 22.2 (ref 10–20)
CALCIUM BLD-MCNC: 9 MG/DL (ref 8.7–10.4)
CHLORIDE SERPL-SCNC: 110 MMOL/L (ref 98–112)
CO2 SERPL-SCNC: 21 MMOL/L (ref 21–32)
CREAT BLD-MCNC: 1.17 MG/DL
EGFRCR SERPLBLD CKD-EPI 2021: 59 ML/MIN/1.73M2 (ref 60–?)
GLUCOSE BLD-MCNC: 101 MG/DL (ref 70–99)
OSMOLALITY SERPL CALC.SUM OF ELEC: 295 MOSM/KG (ref 275–295)
POTASSIUM SERPL-SCNC: 4.5 MMOL/L (ref 3.5–5.1)
Q-T INTERVAL: 488 MS
QRS DURATION: 168 MS
QTC CALCULATION (BEZET): 518 MS
R AXIS: 270 DEGREES
SODIUM SERPL-SCNC: 140 MMOL/L (ref 136–145)
T AXIS: 71 DEGREES
TROPONIN I SERPL HS-MCNC: 56 NG/L
TROPONIN I SERPL HS-MCNC: 58 NG/L
VENTRICULAR RATE: 68 BPM

## 2024-10-01 PROCEDURE — 93306 TTE W/DOPPLER COMPLETE: CPT

## 2024-10-01 PROCEDURE — 93971 EXTREMITY STUDY: CPT | Performed by: NURSE PRACTITIONER

## 2024-10-01 PROCEDURE — 0HQEXZZ REPAIR LEFT LOWER ARM SKIN, EXTERNAL APPROACH: ICD-10-PCS | Performed by: STUDENT IN AN ORGANIZED HEALTH CARE EDUCATION/TRAINING PROGRAM

## 2024-10-01 RX ORDER — ALPRAZOLAM 0.25 MG
0.25 TABLET ORAL NIGHTLY PRN
Status: DISCONTINUED | OUTPATIENT
Start: 2024-10-01 | End: 2024-10-02

## 2024-10-01 RX ORDER — FERROUS SULFATE 325(65) MG
325 TABLET, DELAYED RELEASE (ENTERIC COATED) ORAL 2 TIMES DAILY WITH MEALS
Status: DISCONTINUED | OUTPATIENT
Start: 2024-10-01 | End: 2024-10-02

## 2024-10-01 RX ORDER — DOXYCYCLINE 100 MG/1
100 CAPSULE ORAL EVERY 12 HOURS SCHEDULED
Status: DISCONTINUED | OUTPATIENT
Start: 2024-10-01 | End: 2024-10-03

## 2024-10-01 RX ORDER — ATORVASTATIN CALCIUM 10 MG/1
10 TABLET, FILM COATED ORAL NIGHTLY
Status: DISCONTINUED | OUTPATIENT
Start: 2024-10-01 | End: 2024-10-03

## 2024-10-01 RX ORDER — PANTOPRAZOLE SODIUM 40 MG/1
40 TABLET, DELAYED RELEASE ORAL
Status: DISCONTINUED | OUTPATIENT
Start: 2024-10-01 | End: 2024-10-03

## 2024-10-01 RX ORDER — QUETIAPINE FUMARATE 25 MG/1
25 TABLET, FILM COATED ORAL NIGHTLY
COMMUNITY

## 2024-10-01 RX ORDER — QUETIAPINE FUMARATE 25 MG/1
25 TABLET, FILM COATED ORAL NIGHTLY
Status: DISCONTINUED | OUTPATIENT
Start: 2024-10-01 | End: 2024-10-03

## 2024-10-01 RX ORDER — AZITHROMYCIN 250 MG/1
500 TABLET, FILM COATED ORAL
Status: DISCONTINUED | OUTPATIENT
Start: 2024-10-02 | End: 2024-10-01

## 2024-10-01 RX ORDER — ASPIRIN 81 MG/1
81 TABLET ORAL DAILY
Status: DISCONTINUED | OUTPATIENT
Start: 2024-10-01 | End: 2024-10-03

## 2024-10-01 RX ORDER — DONEPEZIL HYDROCHLORIDE 5 MG/1
10 TABLET, FILM COATED ORAL NIGHTLY
Status: DISCONTINUED | OUTPATIENT
Start: 2024-10-01 | End: 2024-10-03

## 2024-10-01 RX ORDER — ONDANSETRON 2 MG/ML
4 INJECTION INTRAMUSCULAR; INTRAVENOUS EVERY 6 HOURS PRN
Status: DISCONTINUED | OUTPATIENT
Start: 2024-10-01 | End: 2024-10-03

## 2024-10-01 RX ORDER — METOCLOPRAMIDE HYDROCHLORIDE 5 MG/ML
5 INJECTION INTRAMUSCULAR; INTRAVENOUS EVERY 8 HOURS PRN
Status: DISCONTINUED | OUTPATIENT
Start: 2024-10-01 | End: 2024-10-01

## 2024-10-01 RX ORDER — DOCUSATE SODIUM 100 MG/1
100 CAPSULE, LIQUID FILLED ORAL 2 TIMES DAILY
Status: DISCONTINUED | OUTPATIENT
Start: 2024-10-01 | End: 2024-10-02

## 2024-10-01 RX ORDER — LEVOTHYROXINE SODIUM 112 UG/1
112 TABLET ORAL
Status: DISCONTINUED | OUTPATIENT
Start: 2024-10-01 | End: 2024-10-03

## 2024-10-01 RX ORDER — MIRTAZAPINE 7.5 MG/1
7.5 TABLET, FILM COATED ORAL NIGHTLY
Status: DISCONTINUED | OUTPATIENT
Start: 2024-10-01 | End: 2024-10-02

## 2024-10-01 RX ORDER — FUROSEMIDE 10 MG/ML
20 INJECTION INTRAMUSCULAR; INTRAVENOUS ONCE
Status: COMPLETED | OUTPATIENT
Start: 2024-10-01 | End: 2024-10-01

## 2024-10-01 RX ORDER — HEPARIN SODIUM 5000 [USP'U]/ML
5000 INJECTION, SOLUTION INTRAVENOUS; SUBCUTANEOUS EVERY 8 HOURS SCHEDULED
Status: DISCONTINUED | OUTPATIENT
Start: 2024-10-01 | End: 2024-10-03

## 2024-10-01 RX ORDER — ACETAMINOPHEN 500 MG
500 TABLET ORAL EVERY 4 HOURS PRN
Status: DISCONTINUED | OUTPATIENT
Start: 2024-10-01 | End: 2024-10-03

## 2024-10-01 RX ORDER — MAGNESIUM OXIDE 400 MG (241.3 MG MAGNESIUM) TABLET
1000 TABLET DAILY
Status: DISCONTINUED | OUTPATIENT
Start: 2024-10-01 | End: 2024-10-01

## 2024-10-01 RX ORDER — FUROSEMIDE 40 MG
40 TABLET ORAL DAILY
Status: DISCONTINUED | OUTPATIENT
Start: 2024-10-01 | End: 2024-10-02

## 2024-10-01 RX ORDER — CHOLECALCIFEROL (VITAMIN D3) 25 MCG
2000 TABLET ORAL DAILY
Status: DISCONTINUED | OUTPATIENT
Start: 2024-10-01 | End: 2024-10-01

## 2024-10-01 NOTE — CM/SW NOTE
10/01/24 1100   CM/SW Referral Data   Referral Source Physician   Reason for Referral Discharge planning   Informant Daughter   Medical Hx   Does patient have an established PCP? Yes  (Ezequiel Grullon)   Patient Info   Patient's Current Mental Status at Time of Assessment Alert;Oriented;Memory Impairments   Patient's Home Environment House   Number of Levels in Home 2   Number of Stair in Home 7   Patient lives with Spouse/Significant other   Patient Status Prior to Admission   Independent with ADLs and Mobility No   Pt. requires assistance with Housework;Driving;Meals;Bathing;Dressing;Medications;Finances   Services in place prior to admission Home Health Care;DME/Supplies at home  (Daily 24 hr CG services)   Home Health Provider Info [facility]  (RN and PT)   Type of DME/Supplies Standard Walker;Rollator Walker;Wheelchair;Shower Chair;Grab Bars   Discharge Needs   Anticipated D/C needs To be determined     Pt discussed during nursing rounds. Dx CHF on RA, dementia. Home w/spouse, independent w/walker in the house, uses wheelchair when away from home. Pt has daily 24 hr paid CG services. Current w/[facility] for RN and PT services, DESI entered. PT/OT evchente scheduled for later today.    Plan: TBD    / to remain available for support and/or discharge planning.     DANIELLE Lin    [phone]

## 2024-10-01 NOTE — ED QUICK NOTES
Orders for admission, patient is aware of plan and ready to go upstairs. Any questions, please call ED RN Sharla at extension [phone].     Patient Covid vaccination status: Fully vaccinated     COVID Test Ordered in ED: None    COVID Suspicion at Admission: N/A    Running Infusions:      Mental Status/LOC at time of transport: AAOx3    Other pertinent information: wears hearing aids, can take pills whole with water but might need to crush and and use apple sauce as patient reports patient sometimes needs queuing to swallow. No aspiration noted with med pass.  CIWA score: N/A   NIH score:  N/A

## 2024-10-01 NOTE — DISCHARGE INSTRUCTIONS
Resume services with [facility]  [phone]    Going Home Instructions  In this section you will find the tools which will guide you through the first few days after you leave the hospital. Continued use of these tools will help you develop the skills necessary to keep your heart failure under control.     Home Care Instructions Following Heart Failure - the most important things to do every day include:   Weigh yourself and review the “Self-Check Plan” sheet every morning.   Call your cardiologist office if you are in the “Pay Attention-Use Caution” (yellow zone) or “Medical Alert-Warning!” (red zone) as outlined in the Self-Check Plan sheet.  Take your medicines as prescribed.  Limit your sodium (salt) intake.  Know when to call your cardiologist, primary doctor, or nurse.  Know when to seek emergency care.      Things for You to Remember:   1. See your doctor or healthcare provider as written on your discharge instructions.  It is important that you attend this appointment to make sure your symptoms are under control.     2. Your recommended sodium intake is 2554-6672 mg daily.    3.  Weigh yourself every day.    4. Some exercise and activity is important to help keep your heart functioning and strong. Unless instructed not to exercise, you may walk at a slow to moderate pace for 10-15 minutes 2-3 days per week to start. Pace your activity to prevent shortness of breath or fatigue. Stop exercising if you develop chest pain, lightheadedness, or significant shortness of breath.       Call Your Cardiologist If:   You gain 2-3 pounds in one day or 5 pounds in one week.  You have more difficulty breathing.  You are getting more tired with normal activity.  You are more short of breath lying down, or awaken at night short of breath.  You have swelling of your feet or legs.  You urinate less often during the day and more often at night.  You have cramps in your legs.  You have blurred vision or see  yellowish-green halos around objects of lights.    Go to the Emergency Room If:   You have pain or tightness in your chest  You are extremely short of breath  You are coughing up pink-frothy mucus  You are traveling and develop symptoms of worsening heart failure      ** Please follow up with your cardiologist or Advanced Practice Provider as written on your discharge instructions. If you are not provided with an appointment, let your nurse know so you can get an appointment**

## 2024-10-01 NOTE — ED PROVIDER NOTES
Patient Seen in: [facility] Emergency Department      History     Chief Complaint   Patient presents with    Weakness     Stated Complaint:     Subjective:   HPI    89-year-old male with history of A-fib, hypertension, hyperlipidemia, HFpEF, presenting for evaluation of generalized weakness.  He is brought in by EMS from home.  2-day history of cough increased weakness shortness of breath.  2 witnessed falls today while going up the stairs.  Each of these were caught and there is no head injury but he did sustain a skin tear to his left forearm.  He has history of dementia he is at neurologic baseline.  On aspirin but no other blood thinners.    Objective:   Past Medical History:    Anemia    Negative scopes    Aortic dilatation (HCC)    thoracic aorta - 4.6 cm -repeat CT chest in 8/2019    Arrhythmia    AFib-pacemaker July 2016 [facility]    ATRIAL FIBRILLATION    s/p cardioversion multiple times - now in NSR - Dr. Daniel Jones, and Dr. Stevenson    Bilateral renal cysts    simple    Clostridium difficile diarrhea    7/2020    Compression fracture    T8, T12    Disorder of thyroid    Essential hypertension    H/O cardiovascular stress test    NM stress testing - unremarkable (9/2015)    Hearing impairment    bilateral hearing aides    High blood pressure    High cholesterol    History of colonic polyps    Repeat Colonscopy PRN    HLD (hyperlipidemia)    HTN (hypertension)    HYPOTHYROIDISM    since 2008    IFG (impaired fasting glucose)    Osteoarthritis    Osteopenia    but with compression fractures    Pacemaker    Prostate cancer (HCC)    s/p prostatectomy -2003 - Urologist - Dr. Calvert    Pulmonary nodule    1.6 cm - stable x 3 years -no further f/up needed    Pulmonary nodule    new pulm nodule- repeat CT chest in 7/2018    Sacral fracture (HCC)    Shingles    Thrombocytopenia (HCC)              Past Surgical History:   Procedure Laterality Date    Ablation      a-fib ablation -> 2008    Back surgery       kyphoplasty - 7/04235    Cardiac pacemaker placement      July 2016 last checked 4/2017    Colonoscopy N/A 09/20/2017    Procedure: COLONOSCOPY, POSSIBLE BIOPSY, POSSIBLE POLYPECTOMY 92617;  Surgeon: Jamari Phelps MD;  Location: [facility]    Colonoscopy,biopsy  09/12/2011    Performed by JAMARI PHELPS at [facility]    Colonoscopy,biopsy  10/17/2012    Procedure: COLONOSCOPY, POSSIBLE BIOPSY, POSSIBLE POLYPECTOMY 51682;  Surgeon: Jamari Phelps MD;  Location: [facility]    Colonoscopy,remv lesn,snare  10/17/2012    Procedure: COLONOSCOPY, POSSIBLE BIOPSY, POSSIBLE POLYPECTOMY 50886;  Surgeon: Jamari Phelps MD;  Location: [facility]    Genital surg proc,male unlisted      Prostatectomy - 2003    Hc kyphoplasty lumbar      Hernia surgery  04/21/2017    Hip replacement surgery      L hip replacement - 1994    Laparoscopy, surgical prostatectomy, retropubic radical, w/nerve sparing      Other  04/19/2018    pacer left lead revision    Other surgical history      cardioversion multiple    Other surgical history      HH 4/20 - L total hip replacement    Other surgical history      s/p cardiac ablation/and pacemaker 7/20/2016    Pacemaker monitor  07/2016    Patient documented not to have experienced any of the following events  10/17/2012    Procedure: COLONOSCOPY, POSSIBLE BIOPSY, POSSIBLE POLYPECTOMY 07267;  Surgeon: Jamari Phelps MD;  Location: [facility]    Patient withough preoperative order for iv antibiotic surgical site infection prophylaxis.  10/17/2012    Procedure: COLONOSCOPY, POSSIBLE BIOPSY, POSSIBLE POLYPECTOMY 90813;  Surgeon: Jamari Phelps MD;  Location: [facility]    Total hip replacement      left hip    Upper gi endoscopy,biopsy  09/12/2011    Performed by JAMARI PHELPS at [facility]                Social History     Socioeconomic History    Marital status:    Tobacco Use    Smoking status: Former     Types:  Cigarettes    Smokeless tobacco: Never    Tobacco comments:     Stopped over 40 yrs ago   Vaping Use    Vaping status: Never Used   Substance and Sexual Activity    Alcohol use: Not Currently     Alcohol/week: 0.0 - 1.0 standard drinks of alcohol     Comment: 2 beers a month    Drug use: No   Other Topics Concern    Caffeine Concern No     Social Determinants of Health     Food Insecurity: No Food Insecurity (12/21/2023)    Food Insecurity     Food Insecurity: Never true   Transportation Needs: No Transportation Needs (12/21/2023)    Transportation Needs     Lack of Transportation: No   Housing Stability: Low Risk  (12/21/2023)    Housing Stability     Housing Instability: No              Review of Systems    Positive for stated Chief Complaint: Weakness    Other systems are as noted in HPI.  Constitutional and vital signs reviewed.      All other systems reviewed and negative except as noted above.    Physical Exam     ED Triage Vitals [09/30/24 2132]   /72   Pulse 70   Resp 22   Temp 98 °F (36.7 °C)   Temp src    SpO2 96 %   O2 Device None (Room air)       Current Vitals:   Vital Signs  BP: 117/65  Pulse: 70  Resp: 22  Temp: 98 °F (36.7 °C)  MAP (mmHg): 82    Oxygen Therapy  SpO2: 95 %  O2 Device: None (Room air)            Physical Exam    Constitutional: awake, alert, no sig distress  HENT: mmm, no lesions,  Neck: normal range of motion, no tenderness, supple.  Eyes: PERRL, EOMI, conjunctiva normal, no discharge. Sclera anicteric.  Cardiovascular: rr no murmur  Respiratory: Bibasilar rales, modestly increased work of breathing.  GI: Bowel sounds normal, Soft, no tenderness, no masses, no pulsatile masses.  : No CVA tenderness.  Skin: Warm, dry, no erythema, 4 cm skin tear left forearm  Musculoskeletal: Intact distal pulses, trace edema bilaterally no cyanosis, no clubbing. Good range of motion in all major joints. No tenderness to palpation or major deformities noted. Back- No tenderness.  Neurologic:  Alert & oriented x 3, normal motor function, normal sensory function, no focal deficits noted.  Psych: Calm, cooperative, nl affect    ED Course     Labs Reviewed   BASIC METABOLIC PANEL (8) - Abnormal; Notable for the following components:       Result Value    Glucose 116 (*)     Potassium 5.5 (*)     BUN 28 (*)     BUN/CREA Ratio 23.0 (*)     eGFR-Cr 56 (*)     All other components within normal limits   HEPATIC FUNCTION PANEL (7) - Abnormal; Notable for the following components:    ALT 8 (*)     Bilirubin, Total 1.0 (*)     Bilirubin, Direct 0.4 (*)     All other components within normal limits   CBC WITH DIFFERENTIAL WITH PLATELET - Abnormal; Notable for the following components:    RBC 3.71 (*)     HGB 11.6 (*)     HCT 35.7 (*)     RDW-SD 55.8 (*)     RDW 15.7 (*)     .0 (*)     Monocyte Absolute 1.10 (*)     All other components within normal limits   TROPONIN I HIGH SENSITIVITY - Abnormal; Notable for the following components:    Troponin I (High Sensitivity) 59 (*)     All other components within normal limits   PRO BETA NATRIURETIC PEPTIDE - Abnormal; Notable for the following components:    Pro-Beta Natriuretic Peptide 9,830 (*)     All other components within normal limits   PROCALCITONIN - Abnormal; Notable for the following components:    Procalcitonin 0.54 (*)     All other components within normal limits   LIPID PANEL - Abnormal; Notable for the following components:    HDL Cholesterol 39 (*)     All other components within normal limits   TROPONIN I HIGH SENSITIVITY   RAINBOW DRAW LAVENDER   RAINBOW DRAW LIGHT GREEN   RAINBOW DRAW BLUE     EKG    Rate, intervals and axes as noted on EKG Report.  Rate: 68  Rhythm: paced  Reading: LAD, LBBB, no stemi by sgarbossa criteria. Qtc 518                          MDM      89M history as documented above presenting with cough increased weakness and shortness of breathing with 2 falls.  Arrival vitals are stable and reassuring.  DDx includes CHF  exacerbation bronchopneumonia, pneumothorax, metabolic derangement  -Update tetanus skin tear was irrigated and repaired primarily with Steri-Strips and glue  Reviewed chest x-ray which is remarkable for bibasilar infiltrates, suspect pneumonia given elevated procalcitonin.  Labs also remarkable for elevated BNP and troponin.  Suspect this is type II MI due to decompensated heart failure.  Potassium mildly elevated 5.5, there are no significant EKG changes and patient is paced, will address this with Lasix as patient appears overloaded.    I spent a total of 39 minutes of critical care time in obtaining history, performing a physical exam, bedside monitoring of interventions, collecting and interpreting tests and discussion with consultants but not including time spent performing procedures.    -reviewed external medical record, including previous hospitalizations for similar    Laceration repair:    Verbal consent was obtained from the patient.  The 4 cm laceration located L Forearm was anesthetized in the usual fashion. The wound was scrubbed, draped and explored.   There were no deep structures involved.  No tendon injury was identified.  The wound was repaired with steristrips and dermabond .  The wound repair was simple.  The procedure was performed by myself.    Admission disposition: 10/1/2024 12:09 AM                                        Medical Decision Making      Disposition and Plan     Clinical Impression:  1. Acute on chronic congestive heart failure, unspecified heart failure type (HCC)    2. Community acquired pneumonia, unspecified laterality    3. Skin tear of left forearm without complication, initial encounter         Disposition:  Admit  10/1/2024 12:09 am    Follow-up:  No follow-up provider specified.  We recommend that you schedule follow up care with a primary care provider within the next three months to obtain basic health screening including reassessment of your blood  pressure.      Medications Prescribed:  Current Discharge Medication List                            Hospital Problems       Present on Admission  Date Reviewed: 8/1/2024            ICD-10-CM Noted POA    * (Principal) Acute on chronic congestive heart failure, unspecified heart failure type (HCC) I50.9 9/12/2022 Unknown

## 2024-10-01 NOTE — H&P
Critical access hospital and [facility]   Hospitalist Team  History and Physical  Admit Date:  9/30/2024    Is this a shared or split note between Advanced Practice Provider and Physician? Yes    ASSESSMENT / PLAN:   89-year-old gentleman with history of hypothyroidism, hypertension, dyslipidemia, osteoporosis, paroxysmal atrial fibrillation,  prostate cancer, atrial fibrillation on Xarelto, PEm, status post permanent pacemaker, Alzheimer's disease, lung nodules, CKD stage 3, skin cancer, MGUS, chroni ITP, OA, oropharyngeal dysphagia, Dilated aorta,  chronic diastolic chf  who presents  weakness, cough, sob and falls, pt found to have acute on chronic diastolic and aspiration pna. Cards and palliative care consulted, see below for details       Acute on Chronic Diastolic CHF  -2022 echo with EF 61% with indeterminant diastolic dysfunction, mild to moderate MR, biatrial enlargement, small atrial septal defect dilated aorta  -CXR with chf and small B/L pleural effusions  with atelectasis ? PNA  -pro bnp 9830  -S/P IV lasix in ER, additional dose now then resume  home lasix 40 mg daily  -daily wts, Cr, I/O  -as per cards     Aspiration PNA  -not compliant with modified diet at home  -afebrile. WBC normal. PCT 0.54  -CXR with chf and small B/L pleural effusions  with atelectasis ? PNA  -modified diet per speech  -abx-s/p zithromax x 1, stopped with prolonged QTc, now on doxy and ceftriaxone  -rec DNR/DNI with diet noncompliance as well as palliative care and to consider hospice, wife willing to consider, palliative care to see 10/2    S/P Fall   Left Forearm Laceration   -s/p steri strips and dermabond in ER  -9/30 S/P TDAP  -PT/OT    Hyperkalemia  -admission K 5.5-->4.5    Elevated troponin- demand ischemia, Type II MI  -troponin 59-->56-->58     Oropharyngeal Dysphagia   -modified diet   -speech to follow      CKD Stage 3   -stable      Permanent Atrial Fibrillation S/P Watchman  S/P PPM   HL  -EKG with V-pacing   -continue asa and  statin      Alzheimers Dementia   -continue home meds-mirtazapine      Hypothyroidism  -continue synthoid      GOC  -full code  -wife Tara at  has daughters Susy and Anisa  -24 hour caretaker   -palliative care consult     MA/ACO Reach  -ER Visits 2024: 0  -Admissions 2024: 1  -7 Day Re- Entry: NO  -30 Day Re- Entry: NO  -Consults: cards and palliative care   -Discharge Needs: TBD  -Appointments: [ ] PCP Ezequiel Grullon MD    FEN  -lytes in am  -diet-modifed diet     Prophy  -SCD  -heparin    Dispo  -pending clinical course  PCP: Ezequiel Grullon MD     Outpatient records or previous hospital records reviewed.   Further recommendations pending patient's clinical course.  [facility] to [facility] to follow patient while in house  Concerns regarding plan of care were discussed with patient. Patient agrees with plan as detailed above. Discussed plan of care with      Note: This chart was prepared using voice recognition software and may contain unintended word substitution errors.     Jessica Hooper RN, NP  [facility] Hospitalist Team   Available via Perfect Serve or Bubble Chat (check Availability)    10/1/2024      HISTORY:   CC:   Chief Complaint   Patient presents with    Weakness        PCP: Ezequiel Grullon MD    History of Present Illness: \      Patient has dementia and is currently not at his baseline cognitive state.  When asked the patient how he is feeling he tells me fine.  Rest of Hx obtained from wife and caretaker at bedside.     Per wife patient apparently has not been eating very much and has been losing some weight.  It sounds as if on Sunday the patient ended up falling out of bed and the son-in-law came to pick the patient up.  Since that time patient has been significantly weak and unable to walk or climb stairs requiring family to move him around.  Patient normally ambulates slowly with a walker and is able to maneuver stairs.  Patient apparently  also is cognitively more withdrawn and not as engaged.  He does have a history of CHF and aspiration. On admission patient was found to have a left forearm laceration that underwent Steri-Strips and Dermabond.  His chest x-ray was suggestive of possible pneumonia and pneumonia.  Per family and caretaker patient is supposed to be on thickened liquids but at times does not follow this as he does not like it, it sounds as if the family cuts up his food into small pieces.  Per caretaker patient tends to cough a lot with eating.      Review of Systems  12 point systems reviewed, please see HPI for pertinent positives, otherwise negative    OBJECTIVE:  /64 (BP Location: Right arm)   Pulse 69   Temp 97.6 °F (36.4 °C) (Axillary)   Resp 20   Ht 5' 5\" (1.651 m)   Wt 118 lb 8 oz (53.8 kg)   SpO2 98%   BMI 19.72 kg/m²     GENERAL: no apparent distress  NEUROLOGIC: Awake, answers simple questions  RESPIRATORY: normal expansion; non labored, diminished BS  CARDIOVASCULAR: regular   ABDOMEN:  Soft, BS+; non distended, non tender    EXTREMITIES: right leg swelling     PMH  Past Medical History:    Anemia    Negative scopes    Aortic dilatation (HCC)    thoracic aorta - 4.6 cm -repeat CT chest in 8/2019    Arrhythmia    AFib-pacemaker July 2016 [facility]    ATRIAL FIBRILLATION    s/p cardioversion multiple times - now in NSR - Dr. Daniel Jones, and Dr. Stevenson    Bilateral renal cysts    simple    Clostridium difficile diarrhea    7/2020    Compression fracture    T8, T12    Disorder of thyroid    Essential hypertension    H/O cardiovascular stress test    NM stress testing - unremarkable (9/2015)    Hearing impairment    bilateral hearing aides    High blood pressure    High cholesterol    History of colonic polyps    Repeat Colonscopy PRN    HLD (hyperlipidemia)    HTN (hypertension)    HYPOTHYROIDISM    since 2008    IFG (impaired fasting glucose)    Osteoarthritis    Osteopenia    but with compression fractures     Pacemaker    Prostate cancer (HCC)    s/p prostatectomy -2003 - Urologist - Dr. Calvert    Pulmonary nodule    1.6 cm - stable x 3 years -no further f/up needed    Pulmonary nodule    new pulm nodule- repeat CT chest in 7/2018    Sacral fracture (HCC)    Shingles    Thrombocytopenia (HCC)        PSH  Past Surgical History:   Procedure Laterality Date    Ablation      a-fib ablation -> 2008    Back surgery      kyphoplasty - 7/72012    Cardiac pacemaker placement      July 2016 last checked 4/2017    Colonoscopy N/A 09/20/2017    Procedure: COLONOSCOPY, POSSIBLE BIOPSY, POSSIBLE POLYPECTOMY 92104;  Surgeon: Jamari Phelps MD;  Location: [facility]    Colonoscopy,biopsy  09/12/2011    Performed by JAMARI PHELPS at [facility]    Colonoscopy,biopsy  10/17/2012    Procedure: COLONOSCOPY, POSSIBLE BIOPSY, POSSIBLE POLYPECTOMY 53157;  Surgeon: Jamari Phelps MD;  Location: [facility]    Colonoscopy,remv lesn,snare  10/17/2012    Procedure: COLONOSCOPY, POSSIBLE BIOPSY, POSSIBLE POLYPECTOMY 82524;  Surgeon: Jamari Phelps MD;  Location: [facility]    Genital surg proc,male unlisted      Prostatectomy - 2003    Hc kyphoplasty lumbar      Hernia surgery  04/21/2017    Hip replacement surgery      L hip replacement - 1994    Laparoscopy, surgical prostatectomy, retropubic radical, w/nerve sparing      Other  04/19/2018    pacer left lead revision    Other surgical history      cardioversion multiple    Other surgical history      HH 4/20 - L total hip replacement    Other surgical history      s/p cardiac ablation/and pacemaker 7/20/2016    Pacemaker monitor  07/2016    Patient documented not to have experienced any of the following events  10/17/2012    Procedure: COLONOSCOPY, POSSIBLE BIOPSY, POSSIBLE POLYPECTOMY 57281;  Surgeon: Jamari Phelps MD;  Location: [facility]    Patient withough preoperative order for iv antibiotic surgical site infection prophylaxis.   10/17/2012    Procedure: COLONOSCOPY, POSSIBLE BIOPSY, POSSIBLE POLYPECTOMY 58124;  Surgeon: Jamari Phelps MD;  Location: [facility]    Total hip replacement      left hip    Upper gi endoscopy,biopsy  09/12/2011    Performed by JAMARI PHELPS at [facility]        ALL:  Allergies   Allergen Reactions    Codeine RASH    Penicillins RASH     Tolerated zosyn without issue        Home Medications:  No outpatient medications have been marked as taking for the 9/30/24 encounter (Hospital Encounter).       Soc Hx  Social History     Tobacco Use    Smoking status: Former     Types: Cigarettes    Smokeless tobacco: Never    Tobacco comments:     Stopped over 40 yrs ago   Substance Use Topics    Alcohol use: Not Currently     Alcohol/week: 0.0 - 1.0 standard drinks of alcohol     Comment: 2 beers a month        Fam Hx  Family History   Problem Relation Age of Onset    Hypertension Father     Diabetes Mother     Obesity Mother     Heart Disorder Mother         MI    Heart Disease Mother         CAD    Cancer Brother         Brain cancer         DIAGNOSTIC DATA:   CBC/Chem  Recent Labs   Lab 09/30/24  2141   WBC 7.4   HGB 11.6*   MCV 96.2   .0*       Recent Labs   Lab 09/30/24  2141      K 5.5*      CO2 21.0   BUN 28*   CREATSERUM 1.22   *   CA 9.2       Recent Labs   Lab 09/30/24  2141   ALT 8*   AST 28   ALB 3.9       Radiology: XR CHEST AP PORTABLE  (CPT=71045)    Result Date: 10/1/2024  CONCLUSION:  1. Low lung volumes and small bilateral pleural effusions with associated basilar atelectasis, with or without superimposed pneumonia.  2. Cardiomegaly with mitral valvular prosthesis, mild pulmonary vascular congestion, and dual chamber pacer device.    A preliminary report was issued by the [facility] teleradiology service. There are no major discrepancies.   Dictated by (CST): Harley Craft MD on 10/01/2024 at 6:44 AM     Finalized by (CST): Harley Craft MD on  10/01/2024 at 6:48 AM             SEE ATTENDING NOTE BELOW      Patient examined and assessed independently. Agree with above APN assessment.     Mr. Lantigua is a 89-year-old gentleman with history of hypothyroidism, HTN, HL, osteoporosis, PAF on xarelto, prostate cancer, PEm, s/p PPM, Alzheimer's disease, lung nodules, CKD stage 3, skin cancer, MGUS, chroni ITP, OA, oropharyngeal dysphagia,   chronic diastolic chf  who presents  weakness, cough, sob and falls, pt found to have acute on chronic diastolic and aspiration pna. History obtained from family, state patient has been significantly weaker in the past few days. Normally able to walk with a walker but now unable to ambulate. Denies fevers or chills. Does had a cough with eating. Wife states he woke up from sleep and was restless a few nights ago, denied SOB or chest pain.     Objective  /64 (BP Location: Right arm)   Pulse 74   Temp 98.2 °F (36.8 °C) (Oral)   Resp 20   Ht 5' 5\" (1.651 m)   Wt 118 lb 8 oz (53.8 kg)   SpO2 98%   BMI 19.72 kg/m²     Exam:  GEN: elderly male in NAD  HEENT: EOMI  Pulm: decreased breath sounds at bases  CV: RRR, no murmurs   ABD: Soft, non-tender, non-distended, +BS  SKIN: warm, dry, chronic venous stasis   EXT: 1-2+ pitting edema R> L    Assessment/Plan    89-year-old gentleman with history of hypothyroidism, hypertension, dyslipidemia, osteoporosis, paroxysmal atrial fibrillation,  prostate cancer, atrial fibrillation on Xarelto, PEm, status post permanent pacemaker, Alzheimer's disease, lung nodules, CKD stage 3, skin cancer, MGUS, chroni ITP, OA, oropharyngeal dysphagia, Dilated aorta,  chronic diastolic chf  who presents  weakness, cough, sob and falls, pt found to have acute on chronic diastolic and aspiration pna. Cards and palliative care consulted, see below for details       Acute on Chronic Diastolic CHF  Aspiration PNA  S/P Fall   Left Forearm Laceration   Hyperkalemia  Elevated troponin- demand ischemia,  Type II MI  Oropharyngeal Dysphagia   CKD Stage 3   Permanent Atrial Fibrillation S/P Watchman  S/P PPM   HL  Alzheimers Dementia   Hypothyroidism  GOC  -full code  -wife Tara at BS has daughters Susy and Anisa  -24 hour caretaker   -palliative care consult     Plan  - continue IV abx, ceftriaxone/doxycycline  - cardiology consulted, continue IV lasix  - palliative care consulted  - PT/OT-     Rest as above.    Jessika Kathleen MD  [facility] hospitalist

## 2024-10-01 NOTE — SLP NOTE
ADULT SWALLOWING EVALUATION    ASSESSMENT    ASSESSMENT/OVERALL IMPRESSION:    Proper PPE worn. Hands sanitized upon entrance/exit Pt room.       BSE ordered 2/2 RN dysphagia screen. Pt admitted 9/30/24 with Acute on chronic CHF. Pt on \"softer foods\"/thin liquids at home with 24 hour caregiver and Pt's spouse. PMH includes dysphagia, dementia, Atrial Fib, Pulmonary Nodule. Current diet solid/thin liquids.     PMH of dysphagia E-EHC:  VFSS 12/21/23 rec soft ETC/mildly-thick liquids. [facility] Outpt VFSS 2/06/24 rec soft ETC/midly-thick liquids with HH Speech d/t aspiration during this swallow of thin liquids via open cup.     CXR 9/30/24:  CONCLUSION:   1. Low lung volumes and small bilateral pleural effusions with associated basilar atelectasis, with or without superimposed pneumonia.   2. Cardiomegaly with mitral valvular prosthesis, mild pulmonary vascular congestion, and dual chamber pacer device.       Pt alert, on room air, afebrile and assessed sitting upright in bed (after consulting with RN). Pt agreed to participate. Grandtr present. Learning preference verbal. No verbal or non-verbal indication of pain. Vocal quality/intensity weak. Oral motor exam essentially unremarkable. Functional dentition. Pt self-fed solid, mildly-thick and thin liquid trials. Bilabial seal adequate; no anterior loss. Lingual skills functional for bolus formation, preparation and control of all consistencies. Bite reflex of solid reduced in strength. Prolonged effortful mastication on solids. Oral cavity clear post swallows.     Pharyngeal response appeared delayed per hyolaryngeal elevation to completion (considered functional in strength/rise to palpation). No overt CSA on solid nor mildly-thick liquids. Delayed cough thin liquids via open cup. Overall, swallow function appeared weak. Sp02 ~97% during this assessment.        IMPRESSIONS:    Pt presents with mild oral dysphagia and possible pharyngeal dysfunction. Collaborated with RN  regarding Pt's swallowing plan of care. BSE results/recommendations discussed with Pt/grandtr. Grandtr v/u. Pt with fair understanding; would benefit from additional reinforcement. Swallowing precautions written on white board in room for reinforcement/carry-over of skills for Pt, family and staff. Call light within Pt's reach upon SLP discharge from room.          PLAN:    To f/u x2 sessions to ensure safe intake of diet and reinforce swallowing/aspiration precautions. To monitor for new CXR results. Family education to be continued as available.      RECOMMENDATIONS   Diet Recommendations - Solids: Mechanical soft chopped/ Soft & Bite Sized  Diet Recommendations - Liquids: Nectar thick liquids/ Mildly thick    Compensatory Strategies Recommended: No straws  Aspiration Precautions: Upright position;Slow rate;Small bites and sips;No straw  Medication Administration Recommendations: Crushed in puree  Treatment Plan/Recommendations: Aspiration precautions    HISTORY   MEDICAL HISTORY  Reason for Referral: RN dysphagia screen    Problem List  Principal Problem:    Acute on chronic congestive heart failure, unspecified heart failure type (HCC)  Active Problems:    Community acquired pneumonia, unspecified laterality    Skin tear of left forearm without complication, initial encounter      Past Medical History  Past Medical History:    Anemia    Negative scopes    Aortic dilatation (HCC)    thoracic aorta - 4.6 cm -repeat CT chest in 8/2019    Arrhythmia    AFib-pacemaker July 2016 [facility]    ATRIAL FIBRILLATION    s/p cardioversion multiple times - now in [facility] - Dr. Daniel Jones, and Dr. Stevenson    Bilateral renal cysts    simple    Clostridium difficile diarrhea    7/2020    Compression fracture    T8, T12    Disorder of thyroid    Essential hypertension    H/O cardiovascular stress test    NM stress testing - unremarkable (9/2015)    Hearing impairment    bilateral hearing aides    High blood pressure    High cholesterol     History of colonic polyps    Repeat Colonscopy PRN    HLD (hyperlipidemia)    HTN (hypertension)    HYPOTHYROIDISM    since 2008    IFG (impaired fasting glucose)    Osteoarthritis    Osteopenia    but with compression fractures    Pacemaker    Prostate cancer (HCC)    s/p prostatectomy -2003 - Urologist - Dr. Calvert    Pulmonary nodule    1.6 cm - stable x 3 years -no further f/up needed    Pulmonary nodule    new pulm nodule- repeat CT chest in 7/2018    Sacral fracture (HCC)    Shingles    Thrombocytopenia (HCC)       Prior Living Situation: Home with spouse (Home with 24 hour caregiver)  Diet Prior to Admission: Soft/ Easy to Chew;Thin liquids  Precautions: Aspiration    Patient/Family Goals: to eat    SWALLOWING HISTORY  Current Diet Consistency: Regular;Thin liquids    OBJECTIVE   ORAL MOTOR EXAMINATION  Dentition: Natural;Functional  Symmetry: Within Functional Limits  Strength: Within Functional Limits  Range of Motion: Within Functional Limits  Rate of Motion: Within Functional Limits    Voice Quality: Weak  Respiratory Status: Unlabored  Consistencies Trialed: Thin liquids;Nectar thick liquids;Hard solid  Method of Presentation: Self presentation;Cup  Patient Positioning: Upright;Midline    Oral Phase of Swallow: Impaired  Mastication: Impaired  Pharyngeal Phase of Swallow: Impaired  Laryngeal Elevation Timing: Appears impaired  Laryngeal Elevation Strength: Appears impaired     (Please note: Silent aspiration cannot be evaluated clinically. Videofluoroscopic Swallow Study is required to rule-out silent aspiration.)      GOALS  Goal #1 The patient will tolerate bite size consistency and mildly-thick liquids without overt signs or symptoms of aspiration with 100 % accuracy over 2 session(s).  In Progress   Goal #2 The patient will utilize compensatory strategies as outlined by  BSSE (clinical evaluation) including Slow rate, Small bites, Small sips, No straws, Upright 90 degrees with PRN feeding  assistance 90 % of the time across 2 sessions.    In Progress   FOLLOW UP  Treatment Plan/Recommendations: Aspiration precautions  Number of Visits to Meet Established Goals: 2  Follow Up Needed (Documentation Required): Yes  SLP Follow-up Date: 10/02/24    Thank you for your referral.   If you have any questions, please contact   Becca Limon M.S. [facility]/SLP  Speech-Language Pathologist  [facility]  #[phone]

## 2024-10-01 NOTE — HISTORICAL OFFICE NOTE
[facility]  Outside Information  Continuity of Care Document  5/9/2024  Mireille Lnatigua - 89 y.o. Male; born [birth year]Summary of episode note, generated on Oct. 01, 2024October 01, 2024   CHIEF COMPLAINT    CHIEF COMPLAINT  Reason for Visit/Chief Complaint   F/U   Mr. Lantigua is here for a 6-month follow-up visit. His health has been stable. He walks with a walker at home and has had no falls. He will get occasional wheezing when he eats but much less than he used to. We thought that this represented aspiration and probably does although the swallow study was unremarkable. There is also component of CHF and since he has gone to 40 mg of furosemide that has not recurred.He has a history of permanent atrial fibrillation and has a permanent VVI pacemaker followed by Dr. Jones. He has a watchman in place and so is not on anticoagulation. Blood pressure is well-controlled and his LV function historically has been normal.Mr. Lantigua and his wife will be celebrating their 70th wedding anniversary next week.     PROBLEMS  Reconcile with Patient's ChartPROBLEMS  Problem Effective Dates Date resolved Problem Status   Aspiration of liquid, [SNOMED-CT: 395818094] 5/22/2023 - Active   Permanent atrial fibrillation, [SNOMED-CT: 211287627] 12/4/2020 - Active   Heart failure with preserved ejection fraction, class III, [SNOMED-CT: 847657832] 11/7/2022 - Active   Presence of Amulet left atrial appendage closure device, [SNOMED-CT: 146985311] 11/7/2022 - Active   History of pulmonary embolus (PE) - Hx, [SNOMED-CT: 635952338] 11/7/2022 - Active   Antiplatelet or antithrombotic long-term use, [SNOMED-CT: 130216379] 7/26/2022 - Active   Ischemic cardiomyopathy, [SNOMED-CT: 504735159] 3/18/2021 - Active   Raynaud's disease without gangrene, [SNOMED-CT: 771079410] 7/12/2021 - Active   Family history of PTCA, [SNOMED-CT: 258291006] 7/12/2021 - Active   Sinus bradycardia, [SNOMED-CT: 27642323] 3/18/2021 -  Active   CAD (coronary artery disease), [SNOMED-CT: 10570041] 9/4/2019 - Active   Congestive heart disease, [SNOMED-CT: 90137457] 3/18/2021 - Active   AV block, [SNOMED-CT: 516395392] 9/4/2019 - Active   Hypercholesterolemia, [SNOMED-CT: 73318192] 9/4/2019 - Active   Coronary atherosclerosis, [SNOMED-CT: 149695595] 3/18/2021 - Active   High blood pressure, [SNOMED-CT: 08480718] 3/18/2021 - Active   Cardiac pacemaker in situ, [SNOMED-CT: 384500504] 9/4/2019 - Active   Thoracic aortic aneurysm, [SNOMED-CT: 720516068] 9/8/2020 - Active   High cholesterol, [SNOMED-CT: 02706395] 3/18/2021 - Active   Essential hypertension, [SNOMED-CT: 28127834] 4/8/2021 - Active     ENCOUNTER DIAGNOSIS    ENCOUNTER DIAGNOSIS  Problem Effective Dates Date resolved Problem Status   Permanent atrial fibrillation, [SNOMED-CT: 115985429] 12/4/2020 - Active   Heart failure with preserved ejection fraction, class III, [SNOMED-CT: 504920197] 11/7/2022 - Active   Presence of Amulet left atrial appendage closure device, [SNOMED-CT: 679438637] 11/7/2022 - Active   History of pulmonary embolus (PE) - Hx, [SNOMED-CT: 859876117] 11/7/2022 - Active   Chronic venous insufficiency, [SNOMED-CT: 06487984] 10/8/2021 - Active   Coronary atherosclerosis, [SNOMED-CT: 090954224] 3/18/2021 - Active   Cardiac pacemaker in situ, [SNOMED-CT: 562331453] 9/4/2019 - Active   Essential hypertension, [SNOMED-CT: 01446043] 4/8/2021 - Active     VITAL SIGNS    VITAL SIGNS  Date / Time: 5/9/2024   BP Systolic 122 mmHg   BP Diastolic 60 mmHg   Height 64 inches   Weight 150 lbs   Pulse Rate 62 bpm   BSA (Body Surface Area) 1.8 cc/m2   BMI (Body Mass Index) 25.7 cc/m2   Blood Pressure 122 / 60 mmHg     PHYSICAL EXAMINATION    PHYSICAL EXAMINATION  Header Details   Constitutional 90%o2sat   Vitals Left Arm Sitting  / 60 mmHg, Pulse rate 62 bpm, Regular, Height in 5' 4\", BMI: 25.7, Weight in 149.91 lbs (or) 68 kgs, BSA : 1.77 cc/m²   General Appearance No Acute Distress    Head/Eyes/Ears/Nose/Mouth/Throat Mucous membranes Moist   Neck Normal carotid pulsations, No carotid bruits, No JVD   Respiratory Unlabored, Lungs clear with normal breath sounds   Cardiovascular Intact distal pulses, Regular rhythm. Normal rate present. Normal and normal S1 and S2   Gastrointestinal Abdomen soft, Non-tender, Normoactive bowel sounds, No organomegaly   Lower Extremities Pulses 2+ and equal bilaterally, Edema 1+   Skin Warm and dry   Neurologic / Psychiatric Alert and Oriented     ALLERGIES, ADVERSE REACTIONS, ALERTS    ALLERGIES, ADVERSE REACTIONS, ALERTS  Type Substance Reaction Severity Status   Allergies Codeine, [RxNorm: 6161270]   Mild Active   Allergies penicillin - Ingredient rash Moderate Active     MEDICATIONS ADMINISTERED DURING VISIT    No data available    MEDICATIONS  Reconcile with Patient's ChartMEDICATIONS  Medication Start Date Route/Frequency Status   docusate sodium 100 mg capsule, [RxNorm: 7669442] 10/7/2022 Take 1 capsule orally 3 times a day. Active   donepeziL 5 mg tablet, [RxNorm: 528380] 5/11/2023 Take 1 tablet orally once a day at night. Active   ferrous sulfate 325 mg (65 mg iron) tablet, [RxNorm: 733133] 10/7/2022 Take 1 tablet orally once a day. Active   furosemide 20 mg tablet, [RxNorm: 521770] 2/3/2023 daily for the next 3 days and then resume 20 mg daily thereafter Active   levothyroxine 112 mcg capsule, [RxNorm: 798449] 11/7/2023 Take 1 capsule orally once a day. Active   Howard Aspirin 81 mg tablet,delayed release, [RxNorm: 137863] 1/5/2023 Take 1 tablet orally once a day. Active   vitamin B12 500 mcg-folic acid 400 mcg tablet, [RxNorm: 006226] 3/14/2022 Take 1 tablet orally once a day. 1000mcg Active     ASSESSMENT    Assessment:  1. Permanent atrial fibrillation. Underlying VVI pacemaker. Rate is controlled and blood pressure is controlled. Stable and asymptomatic.Plan:  1. Continue same cardiac medications.2. Office visit in 6 months     FAMILY  HISTORY    FAMILY HISTORY  Relationship Age Diagnosis   Father 0 No history of Family history of heart disease - FHx   Mother 0 Hypertension (HTN), primary; No history of Family history of heart disease - FHx     GENERAL STATUS    No data available    PAST MEDICAL HISTORY    PAST MEDICAL HISTORY  Problem Date diagonsed Date resolved Status   Aspiration of liquid, [SNOMED-CT: 702807149] 5/22/2023 - Active   Permanent atrial fibrillation, [SNOMED-CT: 774423226] 12/4/2020 - Active   Heart failure with preserved ejection fraction, class III, [SNOMED-CT: 602207920] 11/7/2022 - Active   History of pulmonary embolus (PE) - Hx, [SNOMED-CT: 046848179] 11/7/2022 - Active   Antiplatelet or antithrombotic long-term use, [SNOMED-CT: 712896388] 7/26/2022 - Active   Ischemic cardiomyopathy, [SNOMED-CT: 476165670] 3/18/2021 - Active   Raynaud's disease without gangrene, [SNOMED-CT: 709106208] 7/12/2021 - Active   Family history of PTCA, [SNOMED-CT: 282586732] 7/12/2021 - Active   Sinus bradycardia, [SNOMED-CT: 34065829] 3/18/2021 - Active   CAD (coronary artery disease), [SNOMED-CT: 31062098] 9/4/2019 - Active   Congestive heart disease, [SNOMED-CT: 43694687] 3/18/2021 - Active   AV block, [SNOMED-CT: 941777055] 9/4/2019 - Active   Hypercholesterolemia, [SNOMED-CT: 19453101] 9/4/2019 - Active   Coronary atherosclerosis, [SNOMED-CT: 840479957] 3/18/2021 - Active   High blood pressure, [SNOMED-CT: 06993934] 3/18/2021 - Active   Cardiac pacemaker in situ, [SNOMED-CT: 969946962] 9/4/2019 - Active   Thoracic aortic aneurysm, [SNOMED-CT: 401366337] 9/8/2020 - Active   High cholesterol, [SNOMED-CT: 90604792] 3/18/2021 - Active   Essential hypertension, [SNOMED-CT: 03494035] 4/8/2021 - Active     HISTORY OF PRESENT ILLNESS    Mr. Lantigua is here for a 6-month follow-up visit. His health has been stable. He walks with a walker at home and has had no falls. He will get occasional wheezing when he eats but much less than he used to. We thought  that this represented aspiration and probably does although the swallow study was unremarkable. There is also component of CHF and since he has gone to 40 mg of furosemide that has not recurred.He has a history of permanent atrial fibrillation and has a permanent VVI pacemaker followed by Dr. Jones. He has a watchman in place and so is not on anticoagulation. Blood pressure is well-controlled and his LV function historically has been normal.Mr. Lantigua and his wife will be celebrating their 70th wedding anniversary next week.     IMMUNIZATIONS  Reconcile with Patient's ChartNo data available    PLAN OF CARE    PLAN OF CARE  Planned Care Date   Referral Visit - Ezequielsoledad Grullon ([email or web address]) : [birth year]1/1900   Follow up visit - Kaiden Stevenson MD 1/1/1900     PROCEDURES    No data available    RESULTS    RESULTS  Name Result Date Location - Ordered By   Trans Thoracic Echocardiogram 1.The left ventricle is normal in size with mild left ventricular hypertrophy and normal global left ventricular systolic function. Left vThe left ventricular ejection fraction is 60%. Left ventricular diastolic function is indeterminate. No regional wall motion abnormality is noted.2.Left atrium is severely enlarged.3.Right atrium is moderately enlarged. 4.The right ventricle is normal in size. Right ventricular systolic function is normal.5.Dilatation of the aortic root and ascending aorta is noted. Aortic root diameter is 4.5 cms. Ascending aorta diameter is 4.4 cms. 6.Mild to moderate (1-2+) mitral regurgitation. Mild mitral annular calcification is noted. 7.Mild to moderate (1-2+) aortic regurgitation. Mild calcification of the aortic valve is noted with adequate cuspal excursion.8.Mild (1+) tricuspid regurgitation.9.Mild (1+) pulmonic regurgitation. 10.The estimated pulmonary artery systolic pressure is 31 mmHg assuming a right atrial pressure of 8 mmHg. 7/19/2023 1:30:00 PM Daniel Jones MD   Lower Extremity  Venous Ultrasound 1.The study quality is below average. Limited due to patient discomfort, difficulty tolerating exam/positioning.2.Normal compressibility, augmentation and phasic flow in bilateral lower extremity venous system.3.No evidence of deep venous thrombus or superficial venous thrombus in the bilateral lower extremity venous system.4.Exam performed in the reverse trendelenburg position as well as seated, dangling leg.5.No varicose veins visualized in the lower extremities bilaterally.6.Patient was wearing compression stocking immediately prior to exam, possible limiting evaluation of reflux.7.------------------------8.A single area of reflux was noted on the right GSV.9.A single area of reflux was noted on the left GSV.10.No reflux was noted on the right SSV.11.Reflux was noted in the left SSV.12.Bilaterally SSV's have distal thigh extensions. 1/23/2023 10:00:00 AM Marshal Phelps MD     REVIEW OF SYSTEMS    REVIEW OF SYSTEMS  Header Details   Cardiovascular No history of Chest pain, JIANG, Palpitations, Syncope, PND, Orthopnea, Edema, Claudication   Respiratory No history of SOB, Wheezing, Sputum   Hem/Lymphatic No history of Easy bruising, Blood clots, Hx of blood transfusion, Anemia, Bleeding problems     SOCIAL HISTORY    SOCIAL HISTORY  Social History Element Description Effective Dates   Smoking status Former smoker 8/18/1960     FUNCTIONAL STATUS    No data available    MEDICAL EQUIPMENT    No data available    Goals Sections    No data available    REASON FOR REFERRAL                 Health Concerns Section    No data available    COGNITIVE/MENTAL STATUS    No data available    Patient Demographics    Patient Demographics  Patient Address Patient Name Communication   [address] [place] [address] Mireille Petetoño [phone] (Home)     Patient Demographics  Language Race / Ethnicity Marital Status   Unknown - Spoken White / Unknown      Document Information    Primary Care Provider Other Service  Providers Document Coverage Dates   Kaiden Stevenson  NPI: [id number]  [phone] (Work)  [address], [address]  [place] [address]  [place] [address]  Interpreting Physicians  [facility]  [phone] (Work)  [address] [place] [address] Gerijoleen GRAVES Padilla  NPI: [id number]  [phone] (Work)  [address], [address] [place] [address]  [place] [address]  Nurses     Jody ELY Jimenez  NPI: [id number]  [phone] (Work)  [address], [address] [place] [address]  [place] [address]  Nurses May 09, 2024May 09, 2024      Organization   [facility]  [phone] (Work)  [address], [address] [place] [address]  [place] [address]     Encounter Providers Encounter Date    May 09, 2024May 09, 2024     Legal Authenticator    Kaiden Stevenson  NPI: [id number]  [phone] (Work)  [address], [address] [place] [address]  [place] [address]

## 2024-10-01 NOTE — OCCUPATIONAL THERAPY NOTE
OCCUPATIONAL THERAPY EVALUATION - INPATIENT     Room Number: 511/511-A  Evaluation Date: 10/1/2024  Type of Evaluation: Initial  Presenting Problem: Acute on chronic CHF    Physician Order: IP Consult to Occupational Therapy  Reason for Therapy: ADL/IADL Dysfunction and Discharge Planning    OCCUPATIONAL THERAPY ASSESSMENT   Patient is a 89 year old male admitted 9/30/2024 for acute on chronic CHF.  Prior to admission, patient's baseline is home with 24 hour caregiver and spouse; pt typically ambulates short distances and manages stairs; CG assists with mobility related ADLs; pt is able to self feed with setup.  Patient is currently functioning below baseline with self care and basic mobility.  Patient is requiring up to total A for ADLs; Max Xa2 for functional t/f; pt unable to ambulate during this evaluation as a result of the following impairments: decreased functional strength, decreased endurance, pain, cognitive deficits (baseline dementia), medical status, decreased compliance/participation, decreased insight to deficits, and decreased safety awareness. Occupational Therapy will continue to follow for duration of hospitalization.    Patient will benefit from continued skilled OT Services to promote return to prior level of function and safety with continuous assistance and gradual rehabilitative therapy.    PLAN DURING HOSPITALIZATION  OT Device Recommendations: TBD  OT Treatment Plan: Balance activities;Energy conservation/work simplification techniques;ADL training;Functional transfer training;UE strengthening/ROM;Endurance training;Patient/Family training;Patient/Family education;Cognitive reorientation;Compensatory technique education     OCCUPATIONAL THERAPY MEDICAL/SOCIAL HISTORY   Problem List  Principal Problem:    Acute on chronic congestive heart failure, unspecified heart failure type (HCC)  Active Problems:    Community acquired pneumonia, unspecified laterality    Skin tear of left forearm  without complication, initial encounter    HOME SITUATION  Type of Home: House  Home Layout: Two level  Lives With: Spouse; Caregiver 24 hours  Drives: No  SUBJECTIVE  I need to use the toilet    OCCUPATIONAL THERAPY EXAMINATION      OBJECTIVE  Precautions: Bed/chair alarm  Fall Risk: High fall risk      PAIN ASSESSMENT  Ratin      ACTIVITY TOLERANCE                         O2 SATURATIONS       COGNITION  Overall Cognitive Status:  Impaired      ACTIVITIES OF DAILY LIVING ASSESSMENT  AM-PAC ‘6-Clicks’ Inpatient Daily Activity Short Form  How much help from another person does the patient currently need…  -   Putting on and taking off regular lower body clothing?: Total  -   Bathing (including washing, rinsing, drying)?: Total  -   Toileting, which includes using toilet, bedpan or urinal? : Total  -   Putting on and taking off regular upper body clothing?: A Lot  -   Taking care of personal grooming such as brushing teeth?: A Little  -   Eating meals?: A Little    AM-PAC Score:  Score: 11  Approx Degree of Impairment: 70.42%  Standardized Score (AM-PAC Scale): 29.04  CMS Modifier (G-Code): CL    FUNCTIONAL TRANSFER ASSESSMENT  Sit to Stand: Edge of Bed  Edge of Bed: Maximum Assist (x2)  Toilet Transfer: Maximum Assist (x2)    BED MOBILITY  Rolling: Maximum Assist  Supine to Sit : Maximum Assist  Sit to Supine (OT): Maximum Assist  Scooting: Max A    BALANCE ASSESSMENT  Static Sitting: Minimal Assist  Static Standing: Maximum Assist    FUNCTIONAL ADL ASSESSMENT  Eating: Supervision  Grooming Seated: Minimal Assist  Bathing Seated: Maximum Assist  UB Dressing Seated: Minimal Assist  LB Dressing Seated: Dependent  Toileting Seated: Dependent    THERAPEUTIC EXERCISE     Skilled Therapy Provided: Patient care coordinated with PT; pt requiring x2 person assist for bed mobility; Min A for sitting balance at EOB; slighlty impulsive and attempting to stand on his own; when trying ot stand to RW , OT/PT providing Max A x2  and pt unable to come to full stance; returned to EOB and OT completing Max A SPT to commode with PT providing x1 CGA for safety; pt requiring total A for toileting tasks; x2 person assist for all transfers; pt returned back to bedside chair with Max Ax2; stable at exit; setup for lunch and ensured all needs in reach; alarm set.     EDUCATION PROVIDED  Patient Education : Role of Occupational Therapy; Plan of Care; Discharge Recommendations; Proper Body Mechanics  Patient's Response to Education: Demonstrates Disinterest    The patient's Approx Degree of Impairment: 70.42% has been calculated based on documentation in the [facility] '6 clicks' Inpatient Daily Activity Short Form.  Research supports that patients with this level of impairment may benefit from GR.  Final disposition will be made by interdisciplinary medical team.     Patient End of Session: Up in chair;Needs met;Call light within reach;RN aware of session/findings;Hospital anti-slip socks;Alarm set    OT Goals  Patients self stated goal is: no goals stated     Patient will complete functional transfer with Min A  Comment:     Patient will complete toileting with Min A   Comment:     Patient will tolerate standing for 2-3 minutes in prep for adls with Min A   Comment:    Patient will tolerate unsupported sitting at EOB with supervision  Comment:          Goals  on: 10/20  Frequency: 3x week    Alton Mendez, Occupational Therapist, OTR/L ext [phone]         Patient Evaluation Complexity Level:   Occupational Profile/Medical History MODERATE - Expanded review of history including review of medical or therapy record   Specific performance deficits impacting engagement in ADL/IADL MODERATE  3 - 5 performance deficits   Client Assessment/Performance Deficits MODERATE - Comorbidities and min to mod modifications of tasks    Clinical Decision Making MODERATE - Analysis of occupational profile, detailed assessments, several treatment options    Overall  Complexity MODERATE     OT Session Time: 24 minutes  Self-Care Home Management: 24 minutes    Alton Mendez, Occupational Therapist, OTR/L ext [phone]

## 2024-10-01 NOTE — PHYSICAL THERAPY NOTE
PHYSICAL THERAPY EVALUATION - INPATIENT     Room Number: 511/511-A  Evaluation Date: 10/1/2024  Type of Evaluation: Initial   Physician Order: PT Eval and Treat    Presenting Problem: acute on chronic CHF  Co-Morbidities : dementia  Reason for Therapy: Mobility Dysfunction and Discharge Planning    PHYSICAL THERAPY ASSESSMENT   Patient is a 89 year old male admitted 9/30/2024 for acute on chronic.  Prior to admission, patient's baseline is ambulatory short distances with RW, WC for community, and ascending/descending stairs in home to access bedroom and shower.  Patient is currently functioning below baseline with bed mobility, transfers, gait, and stair negotiation.  Patient is requiring maximum assist and dependent as a result of the following impairments: decreased functional strength, decreased endurance/aerobic capacity, cognitive deficits (dementia), and medical status.  Physical Therapy will continue to follow for duration of hospitalization.    Patient will benefit from continued skilled PT Services to promote return to prior level of function and safety with continuous assistance and gradual rehabilitative therapy .    PLAN DURING HOSPITALIZATION  Nursing Mobility Recommendation : Lift Equipment     PT Treatment Plan: Bed mobility;Body mechanics;Endurance;Energy conservation;Patient education;Family education;Gait training;Range of motion;Stoop training;Transfer training;Balance training  Rehab Potential : Fair  Frequency (Obs): 3-5x/week     PHYSICAL THERAPY MEDICAL/SOCIAL HISTORY   History related to current admission: CHF and dementia      Problem List  Principal Problem:    Acute on chronic congestive heart failure, unspecified heart failure type (HCC)  Active Problems:    Community acquired pneumonia, unspecified laterality    Skin tear of left forearm without complication, initial encounter      HOME SITUATION  Type of Home: House  Home Layout: Two level           Stairs to Bedroom: 12    Railing: Yes     Lives With: Spouse;Caregiver 24 hours    Drives: No         Prior Level of [place]: supervision with RW    SUBJECTIVE  \"I need to poop.\"     PHYSICAL THERAPY EXAMINATION   OBJECTIVE  Precautions: Bed/chair alarm  Fall Risk: High fall risk    WEIGHT BEARING RESTRICTION       PAIN ASSESSMENT     Location: denies       COGNITION  Oriented to self , less talkative per spouse    BALANCE  Static Sitting: Poor +  Dynamic Sitting: Poor +  Static Standing: Poor  Dynamic Standing: Dependent    ACTIVITY TOLERANCE  Pulse: 71        BP: 116/63  BP Location: Right arm  BP Method: Automatic  Patient Position: Semi-Coleman    O2 WALK  Oxygen Therapy  SPO2% on Room Air at Rest: 95    AM-PAC '6-Clicks' INPATIENT SHORT FORM - BASIC MOBILITY  How much difficulty does the patient currently have...  Patient Difficulty: Turning over in bed (including adjusting bedclothes, sheets and blankets)?: A Lot   Patient Difficulty: Sitting down on and standing up from a chair with arms (e.g., wheelchair, bedside commode, etc.): Unable   Patient Difficulty: Moving from lying on back to sitting on the side of the bed?: Unable   How much help from another person does the patient currently need...   Help from Another: Moving to and from a bed to a chair (including a wheelchair)?: Total   Help from Another: Need to walk in hospital room?: Total   Help from Another: Climbing 3-5 steps with a railing?: Total     AM-PAC Score:  Raw Score: 7   Approx Degree of Impairment: 92.36%   Standardized Score (AM-PAC Scale): 26.42   CMS Modifier (G-Code): CM    FUNCTIONAL ABILITY STATUS     Rolling: maximum assist  Supine to Sit: maximum assist  Sit to Supine: maximum assist  Sit to Stand: dependent  X2 reps of SPT, total A x 2     Exercise/Education Provided:  Bed mobility  Body mechanics  Functional activity tolerated  Transfer training    The patient's Approx Degree of Impairment: 92.36% has been calculated based on documentation in the [facility] '6 clicks'  Inpatient Basic Mobility Short Form.  Research supports that patients with this level of impairment may benefit from LTAC.  Final disposition will be made by interdisciplinary medical team.    Patient End of Session: Up in chair;Needs met;Call light within reach;RN aware of session/findings;All patient questions and concerns addressed;Alarm set;Family present    CURRENT GOALS  Goals to be met by: 10/30  Patient Goal Patient's self-stated goal is: unstated    Goal #1 Patient is able to demonstrate supine - sit EOB @ level: minimum assistance     Goal #1   Current Status    Goal #2 Patient is able to demonstrate transfers Sit to/from Stand at assistance level: minimum assistance with walker - rolling     Goal #2  Current Status    Goal #3 Patient is able to ambulate 25 feet with assist device: walker - rolling at assistance level: minimum assistance   Goal #3   Current Status    Goal #4 Patient will negotiate 2 stairs/one curb w/ assistive device and supervision   Goal #4   Current Status    Goal #5 Patient to demonstrate independence with home activity/exercise instructions provided to patient in preparation for discharge.   Goal #5   Current Status    Goal #6    Goal #6  Current Status      Patient Evaluation Complexity Level:  History Moderate - 1 or 2 personal factors and/or co-morbidities   Examination of body systems Moderate - addressing a total of 3 or more elements   Clinical Presentation  Moderate - Evolving   Clinical Decision Making  Moderate Complexity     Therapeutic Activity:  25 minutes

## 2024-10-01 NOTE — ED QUICK NOTES
Rounding Completed    Plan of Care reviewed. Waiting for transport.  Elimination needs assessed.  Provided room assignment to family.    Bed is locked and in lowest position. Call light within reach.

## 2024-10-01 NOTE — ED QUICK NOTES
Assumed care of pateint.   Rounding Completed    Plan of Care reviewed with patient and family.  Elimination needs assessed.  Provided medications and plan of care update.    Bed is locked and in lowest position. Call light within reach.

## 2024-10-01 NOTE — PLAN OF CARE
A&Ox2. Pt is a total lift, voiding via primofit, tolerating crdiac mildly thicken liquids/bite sized diet, on room air. Frequent rounding by nursing staff. Safety precautions maintained/call light within reach. Plan for doppler of the right leg.    Problem: Patient Centered Care  Goal: Patient preferences are identified and integrated in the patient's plan of care  Description: Interventions:  - What would you like us to know as we care for you? I'm from home with my wife and my caregiver  - Provide timely, complete, and accurate information to patient/family  - Incorporate patient and family knowledge, values, beliefs, and cultural backgrounds into the planning and delivery of care  - Encourage patient/family to participate in care and decision-making at the level they choose  - Honor patient and family perspectives and choices  Outcome: Progressing     Problem: SKIN/TISSUE INTEGRITY - ADULT  Goal: Oral mucous membranes remain intact  Description: INTERVENTIONS  - Assess oral mucosa and hygiene practices  - Implement preventative oral hygiene regimen  - Implement oral medicated treatments as ordered  Outcome: Progressing     Problem: Impaired Cognition  Goal: Patient will exhibit improved attention, thought processing and/or memory  Description: Interventions:  - Allow additional time for processing after asking questions or providing instructions  Outcome: Progressing

## 2024-10-01 NOTE — ED INITIAL ASSESSMENT (HPI)
Via ems from home c/o increased weakness and cough since Friday. Unsure of fevers. EMS reports patient had 2 witnessed falls today going up the stairs. No LOC. + aspirin.   Hx dementia, acting appropriate to baseline.

## 2024-10-01 NOTE — HISTORICAL OFFICE NOTE
[facility]  Outside Information  Continuity of Care Document  6/11/2024  Mireille Lantigua - 89 y.o. Male; born [birth year]Summary of episode note, generated on Oct. 01, 2024October 01, 2024   CHIEF COMPLAINT    CHIEF COMPLAINT  Reason for Visit/Chief Complaint   F/U   Patient is here for follow-up regarding permanent atrial fibrillation and a pacemaker with an amulet device.Comes in with a caregiver today. Lives in his home and has 24/7 caregivers. Still has some wheezing walking upstairs. Follows closely with Dr. Stevenson. Pacemaker check today shows battery life likely through 2028 and dependent. Has no chest pain uses wheelchair for the office visit.Patient's status post left atrial occlusion with amulet March 4, 2022. Patient had a follow-up HELEN need to be done with anesthesia due to inability to pass probe this done on May 2, 2022 and shows device normal appearance with no thrombus and good seal. He is on aspirin and Plavix he also has some nose procedures has to get done right now putting salve on it but we talked about he is able to go off Plavix for short time if he needs more surgical procedures. Otherwise we will continue aspirin Plavix to the 6-month erich when he follows up.Normally seen regarding his permanent A. fib, AV node ablation and pacemaker who has had recent falls and bleeding issues when I saw him in the fall. He also follows with Dr. StevensonHe also follows with Dr. Stevenson. He has a hx of AV node ablation, permanent A. fib and a pacemaker. He had hx lead dysfunction and replaced and had slow wound healing with hematoma that healed up perfectly in 2018 and is noninfected.     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 device, [SNOMED-CT: 576804539] 11/7/2022 - Active   History of pulmonary embolus (PE) - Hx, [SNOMED-CT: 597161478] 11/7/2022 - Active   Antiplatelet or antithrombotic long-term use, [SNOMED-CT: 910117991] 7/26/2022 - Active   Ischemic cardiomyopathy, [SNOMED-CT: 635572178] 3/18/2021 - Active   Raynaud's disease without gangrene, [SNOMED-CT: 954677815] 7/12/2021 - Active   Sinus bradycardia, [SNOMED-CT: 53379005] 3/18/2021 - Active   CAD (coronary artery disease), [SNOMED-CT: 91323419] 9/4/2019 - Active   AV block, [SNOMED-CT: 679365538] 9/4/2019 - Active   Hypercholesterolemia, [SNOMED-CT: 70780340] 9/4/2019 - Active   Cardiac pacemaker in situ, [SNOMED-CT: 961334366] 9/4/2019 - Active   Thoracic aortic aneurysm, [SNOMED-CT: 068395763] 9/8/2020 - Active   Essential hypertension, [SNOMED-CT: 56242283] 4/8/2021 - Active     ENCOUNTER DIAGNOSIS    ENCOUNTER DIAGNOSIS  Problem Effective Dates Date resolved Problem Status   Permanent atrial fibrillation, [SNOMED-CT: 188460451] 12/4/2020 - Active   Heart failure with preserved ejection fraction, class III, [SNOMED-CT: 103920377] 11/7/2022 - Active   Presence of Amulet left atrial appendage closure device, [SNOMED-CT: 227077837] 11/7/2022 - Active   History of pulmonary embolus (PE) - Hx, [SNOMED-CT: 248722223] 11/7/2022 - Active   Chronic venous insufficiency, [SNOMED-CT: 00484901] 10/8/2021 - Active   Cardiac pacemaker in situ, [SNOMED-CT: 541573180] 9/4/2019 - Active   Essential hypertension, [SNOMED-CT: 36250568] 4/8/2021 - Active     VITAL SIGNS    VITAL SIGNS  Date / Time: 6/12/2024   BP Systolic 116 mmHg   BP Diastolic 62 mmHg   Height 64 inches   Weight 150 lbs   Pulse Rate 71 bpm   BSA (Body Surface Area) 1.8 cc/m2   BMI (Body Mass Index) 25.7 cc/m2   Blood Pressure 116 / 62 mmHg     PHYSICAL EXAMINATION    PHYSICAL EXAMINATION  Header Details   Constitutional 90% O2   Vitals Left Arm Sitting  / 62 mmHg, Pulse rate 71  bpm, Regular, Height in 5' 4\", BMI: 25.7, Weight in 149.91 lbs (or) 68 kgs, BSA : 1.77 cc/m²   General Appearance No Acute Distress, Appropriate   Head/Eyes/Ears/Nose/Mouth/Throat Conjunctiva pink, Sclera Clear, Mucous membranes Moist   Neck Normal carotid pulsations   Respiratory Unlabored, Equal bilaterally   Cardiovascular Regular rhythm. Normal and normal S1 and S2   Gastrointestinal Abdomen soft, Non-tender, Normoactive bowel sounds   Musculoskeletal Normal spine   Gait Normal gait   Strength and tone Normal muscle strength   Upper Extremities No clubbing, No cyanosis   Lower Extremities Pulses 2+ and equal bilaterally, No edema   Skin Warm and dry, No rashes or lesions   Neurologic / Psychiatric Alert and Oriented   Speech Normal speech     ALLERGIES, ADVERSE REACTIONS, ALERTS    ALLERGIES, ADVERSE REACTIONS, ALERTS  Type Substance Reaction Severity Status   Allergies Codeine, [RxNorm: 8435940]   Mild Active   Allergies penicillin - Ingredient rash Moderate Active     MEDICATIONS ADMINISTERED DURING VISIT    No data available    MEDICATIONS  Reconcile with Patient's ChartMEDICATIONS  Medication Start Date Route/Frequency Status   docusate sodium 100 mg capsule, [RxNorm: 3150333] 10/7/2022 Take 1 capsule orally 3 times a day. Active   donepeziL 5 mg tablet, [RxNorm: 710184] 5/11/2023 Take 1 tablet orally once a day at night. Active   ferrous sulfate 325 mg (65 mg iron) tablet, [RxNorm: 659287] 10/7/2022 Take 1 tablet orally once a day. Active   furosemide 40 mg tablet, [RxNorm: 800108] 6/12/2024 Take 1 tablet orally once a day. Active   levothyroxine 112 mcg capsule, [RxNorm: 881137] 11/7/2023 Take 1 capsule orally once a day. Active   [place] Aspirin 81 mg tablet,delayed release, [RxNorm: 520936] 1/5/2023 Take 1 tablet orally once a day. Active   vitamin B12 500 mcg-folic acid 400 mcg tablet, [RxNorm: 051607] 3/14/2022 Take 1 tablet orally once a day. 1000mcg Active     ASSESSMENT    1. Pacemaker  status  post ventricular lead fracture replacement  partial wound dehiscence and poor wound healing all resolved  Dependent status post AV node ablation  Battery life until later 2027  2. Atrial fibrillation, permanent  rate controlled AV node ablation  Status post amulet  In the past was back on Eliquis due to pulmonary embolism but no longer on any anticoagulation3. Coronary disease  history of chest pain in the past  none currently on medical therapy follows Dr. Stevenson  4. Dyslipidemia  on statin LFTs is elevated now normal  continue cholesterol checks  5. Ao dilation  Mild, follows with Graham. Has a chest CT ordered and follow-up Dr. Stevenson  6. Pulmonary embolism  Pulmonary embolism diagnosed September 2022  Was on Eliquis no longer  6. Heart failure with preserved ejection fraction  Has occasional wheezing especially walking upstairs  Was admitted for heart failure December 2023PLAN:  Continue current medications  Routine follow-up with Dr. Stevenson as scheduled in November  Follow-up pacemaker clinic annually  Follow myself annually during pacemaker visit     FAMILY HISTORY    FAMILY HISTORY  Relationship Age Diagnosis   Father 0 No history of Family history of heart disease - FHx   Mother 0 Hypertension (HTN), primary; No history of Family history of heart disease - FHx     GENERAL STATUS    No data available    PAST MEDICAL HISTORY    PAST MEDICAL HISTORY  Problem Date diagonsed Date resolved Status   Permanent atrial fibrillation, [SNOMED-CT: 616885355] 12/4/2020 - Active   Heart failure with preserved ejection fraction, class III, [SNOMED-CT: 025616170] 11/7/2022 - Active   History of pulmonary embolus (PE) - Hx, [SNOMED-CT: 884273539] 11/7/2022 - Active   Antiplatelet or antithrombotic long-term use, [SNOMED-CT: 546187562] 7/26/2022 - Active   Ischemic cardiomyopathy, [SNOMED-CT: 215817856] 3/18/2021 - Active   Raynaud's disease without gangrene, [SNOMED-CT: 568419655] 7/12/2021 - Active   Sinus bradycardia,  [SNOMED-CT: 58605764] 3/18/2021 - Active   CAD (coronary artery disease), [SNOMED-CT: 54240811] 9/4/2019 - Active   AV block, [SNOMED-CT: 650381810] 9/4/2019 - Active   Hypercholesterolemia, [SNOMED-CT: 89438589] 9/4/2019 - Active   Cardiac pacemaker in situ, [SNOMED-CT: 974319417] 9/4/2019 - Active   Thoracic aortic aneurysm, [SNOMED-CT: 498276553] 9/8/2020 - Active   Essential hypertension, [SNOMED-CT: 23108044] 4/8/2021 - Active     HISTORY OF PRESENT ILLNESS    Patient is here for follow-up regarding permanent atrial fibrillation and a pacemaker with an amulet device.Comes in with a caregiver today. Lives in his home and has 24/7 caregivers. Still has some wheezing walking upstairs. Follows closely with Dr. Stevenson. Pacemaker check today shows battery life likely through 2028 and dependent. Has no chest pain uses wheelchair for the office visit.Patient's status post left atrial occlusion with amulet March 4, 2022. Patient had a follow-up HELEN need to be done with anesthesia due to inability to pass probe this done on May 2, 2022 and shows device normal appearance with no thrombus and good seal. He is on aspirin and Plavix he also has some nose procedures has to get done right now putting salve on it but we talked about he is able to go off Plavix for short time if he needs more surgical procedures. Otherwise we will continue aspirin Plavix to the 6-month erich when he follows up.Normally seen regarding his permanent A. fib, AV node ablation and pacemaker who has had recent falls and bleeding issues when I saw him in the fall. He also follows with Dr. StevensonHe also follows with Dr. Stevenson. He has a hx of AV node ablation, permanent A. fib and a pacemaker. He had hx lead dysfunction and replaced and had slow wound healing with hematoma that healed up perfectly in 2018 and is noninfected.     IMMUNIZATIONS  Reconcile with Patient's ChartNo data available    PLAN OF CARE    PLAN OF CARE  Planned Care Date   Referral  Visit - Ezequiel Grullon ([email or web address]) : 1/1/1900   Follow up visit - Daniel Jones MD 7/16/2024     PROCEDURES    No data available    RESULTS    RESULTS  Name Result Date Location - Ordered By   Trans Thoracic Echocardiogram 1.The left ventricle is normal in size with mild left ventricular hypertrophy and normal global left ventricular systolic function. Left vThe left ventricular ejection fraction is 60%. Left ventricular diastolic function is indeterminate. No regional wall motion abnormality is noted.2.Left atrium is severely enlarged.3.Right atrium is moderately enlarged. 4.The right ventricle is normal in size. Right ventricular systolic function is normal.5.Dilatation of the aortic root and ascending aorta is noted. Aortic root diameter is 4.5 cms. Ascending aorta diameter is 4.4 cms. 6.Mild to moderate (1-2+) mitral regurgitation. Mild mitral annular calcification is noted. 7.Mild to moderate (1-2+) aortic regurgitation. Mild calcification of the aortic valve is noted with adequate cuspal excursion.8.Mild (1+) tricuspid regurgitation.9.Mild (1+) pulmonic regurgitation. 10.The estimated pulmonary artery systolic pressure is 31 mmHg assuming a right atrial pressure of 8 mmHg. 7/19/2023 1:30:00 PM Daniel Jones MD   Lower Extremity Venous Ultrasound 1.The study quality is below average. Limited due to patient discomfort, difficulty tolerating exam/positioning.2.Normal compressibility, augmentation and phasic flow in bilateral lower extremity venous system.3.No evidence of deep venous thrombus or superficial venous thrombus in the bilateral lower extremity venous system.4.Exam performed in the reverse trendelenburg position as well as seated, dangling leg.5.No varicose veins visualized in the lower extremities bilaterally.6.Patient was wearing compression stocking immediately prior to exam, possible limiting evaluation of reflux.7.------------------------8.A single area of reflux  was noted on the right GSV.9.A single area of reflux was noted on the left GSV.10.No reflux was noted on the right SSV.11.Reflux was noted in the left SSV.12.Bilaterally SSV's have distal thigh extensions. 1/23/2023 10:00:00 AM Marshal Phelps MD     REVIEW OF SYSTEMS    REVIEW OF SYSTEMS  Header Details   Eyes No history of Blurry vision, Visual changes, Double vision   Cardiovascular Edema  No history of Chest pain, JIANG, Palpitations, Syncope, PND, Orthopnea, Claudication   Respiratory No history of SOB, Wheezing, Sputum   Hem/Lymphatic No history of Easy bruising, Blood clots, Hx of blood transfusion, Anemia, Bleeding problems     SOCIAL HISTORY    SOCIAL HISTORY  Social History Element Description Effective Dates   Smoking status Former smoker 8/18/1960     FUNCTIONAL STATUS    No data available    MEDICAL EQUIPMENT    No data available    Goals Sections    No data available    REASON FOR REFERRAL                   Health Concerns Section    No data available    COGNITIVE/MENTAL STATUS    No data available    Patient Demographics    Patient Demographics  Patient Address Patient Name Communication   [address] [place] [address] Kamaroli Lantigua [phone] (Home)     Patient Demographics  Language Race / Ethnicity Marital Status   Unknown - Spoken White / Unknown      Document Information    Primary Care Provider Other Service Providers Document Coverage Dates   Daniel ERICKSONKAITLYN Jones  NPI: [id number]  [phone] (Work)  [address] [facility], [address]  [place] [address]  [place] [address]  Interpreting Physicians  [facility]  [phone] (Work)  [address] [place] [address] Olivia Theodore  NPI: [id number]  [phone] (Work)  [address] [facility], [address] [place] [address]  [place] [address]  Nurses     Tylor Jj  NPI: [id number]  [phone] (Work)  [address] [facility], [address] [place] [address]  [place] [address]  Nurses Ihsan. 12,  2024June 12, 2024      Organization   [facility]  [phone] (Work)  [address] [facility], [address] [place] [address]  [place] [address]     Encounter Providers Encounter Date    Jun. 12, 2024June 12, 2024     Legal Authenticator    Daniel Jones  NPI: [id number]  [phone] (Work)  [address] [facility], [address] [place] [address]  [place] [address]       27-Aug-2018 11:53

## 2024-10-01 NOTE — CONSULTS
[facility]  part of [facility]    Cardiology Consultation    Mireille Lantigua Patient Status:  Inpatient    1934 MRN [id number]   Location [facility]5W Attending Jessika Kathleen MD   Hosp Day # 0 PCP Ezequiel Grullon MD     Date of Admission:  2024  Date of Consult:  10/1/2024  Reason for Consultation: HFpEF    History of Present Illness:   Patient is a 89 year old male with sig PMH/o HFpEF, permanent AF s/p AVN ablation s/p DC-PPM and NOLAN amulet presents with progressive dyspnea and generalized weakness.   In the ED, vitals stable, satting well on RA.   CXR with basilar atelectasis w/wo pneumonia, mild pulmonary edema.  Labs with elevated proBNP of 9830 (last 5749 2023), elevated troponin levels (59, 56, 58).   ECG with V paced rhythm.     Of note, last TTE 2023 with preserved EF (60%), severely enlarged LA, mild to moderate AI, mild to moderate MR  Assessment and Plan:   HFpEF, acute on chronic exacerbation  -presents with progressive dyspnea  -labs with elevated proBNP of 9830 (previously 5749 in 2023).   -chest imaging with mild edema, possible PNA?   -started on Abx  -start cautious IV diuresis with lasix 20mg BID   -strict I/Os   -closely monitor renal function/lytes  -check TTE    Troponin elevation  -mildly elevated high sensitivity troponin without significant rise or fall (59, 56, 58) is not indicative of acute MI  -most likely chronic non-MI related troponin elevation  -further risk stratification with TTE    AF s/p AVN ablation  S/p DC-PPM  S/p NOLAN amulet   -ECG V-paced rhythm    Past Medical History  Past Medical History:    Anemia    Negative scopes    Aortic dilatation (HCC)    thoracic aorta - 4.6 cm -repeat CT chest in 2019    Arrhythmia    AFib-pacemaker 2016 [employer]    ATRIAL FIBRILLATION    s/p cardioversion multiple times - now in NSR - Dr. Daniel Jones, and Dr. Stevenson    Bilateral renal cysts    simple    Clostridium difficile diarrhea     7/2020    Compression fracture    T8, T12    Disorder of thyroid    Essential hypertension    H/O cardiovascular stress test    NM stress testing - unremarkable (9/2015)    Hearing impairment    bilateral hearing aides    High blood pressure    High cholesterol    History of colonic polyps    Repeat Colonscopy PRN    HLD (hyperlipidemia)    HTN (hypertension)    HYPOTHYROIDISM    since 2008    IFG (impaired fasting glucose)    Osteoarthritis    Osteopenia    but with compression fractures    Pacemaker    Prostate cancer (HCC)    s/p prostatectomy -2003 - Urologist - Dr. Calvert    Pulmonary nodule    1.6 cm - stable x 3 years -no further f/up needed    Pulmonary nodule    new pulm nodule- repeat CT chest in 7/2018    Sacral fracture (HCC)    Shingles    Thrombocytopenia (HCC)       Past Surgical History  Past Surgical History:   Procedure Laterality Date    Ablation      a-fib ablation -> 2008    Back surgery      kyphoplasty - 7/72012    Cardiac pacemaker placement      July 2016 last checked 4/2017    Colonoscopy N/A 09/20/2017    Procedure: COLONOSCOPY, POSSIBLE BIOPSY, POSSIBLE POLYPECTOMY 66384;  Surgeon: Jamari Phelps MD;  Location: [facility]    Colonoscopy,biopsy  09/12/2011    Performed by JAMARI PHELPS at [facility]    Colonoscopy,biopsy  10/17/2012    Procedure: COLONOSCOPY, POSSIBLE BIOPSY, POSSIBLE POLYPECTOMY 56833;  Surgeon: Jamari Phelps MD;  Location: [facility]    Colonoscopy,remv lesn,snare  10/17/2012    Procedure: COLONOSCOPY, POSSIBLE BIOPSY, POSSIBLE POLYPECTOMY 04245;  Surgeon: Jamari Phelps MD;  Location: [facility]    Genital surg proc,male unlisted      Prostatectomy - 2003    Hc kyphoplasty lumbar      Hernia surgery  04/21/2017    Hip replacement surgery      L hip replacement - 1994    Laparoscopy, surgical prostatectomy, retropubic radical, w/nerve sparing      Other  04/19/2018    pacer left lead revision    Other surgical history       cardioversion multiple    Other surgical history      HH 4/20 - L total hip replacement    Other surgical history      s/p cardiac ablation/and pacemaker 7/20/2016    Pacemaker monitor  07/2016    Patient documented not to have experienced any of the following events  10/17/2012    Procedure: COLONOSCOPY, POSSIBLE BIOPSY, POSSIBLE POLYPECTOMY 44919;  Surgeon: Jamari Phelps MD;  Location: [facility]    Patient withough preoperative order for iv antibiotic surgical site infection prophylaxis.  10/17/2012    Procedure: COLONOSCOPY, POSSIBLE BIOPSY, POSSIBLE POLYPECTOMY 50608;  Surgeon: Jamari Phelps MD;  Location: [facility]    Total hip replacement      left hip    Upper gi endoscopy,biopsy  09/12/2011    Performed by JAMARI PHELPS at [facility]       Family History  Family History   Problem Relation Age of Onset    Hypertension Father     Diabetes Mother     Obesity Mother     Heart Disorder Mother         MI    Heart Disease Mother         CAD    Cancer Brother         Brain cancer       Social History  Pediatric History   Patient Parents    Not on file     Other Topics Concern     Service Not Asked    Blood Transfusions Not Asked    Caffeine Concern No    Occupational Exposure Not Asked    Hobby Hazards Not Asked    Sleep Concern Not Asked    Stress Concern Not Asked    Weight Concern Not Asked    Special Diet Not Asked    Back Care Not Asked    Exercise Not Asked    Bike Helmet Not Asked    Seat Belt Not Asked    Self-Exams Not Asked   Social History Narrative    Not on file           Current Medications:  Current Facility-Administered Medications   Medication Dose Route Frequency    acetaminophen (Tylenol Extra Strength) tab 500 mg  500 mg Oral Q4H PRN    ALPRAZolam (Xanax) tab 0.25 mg  0.25 mg Oral Nightly PRN    aspirin DR tab 81 mg  81 mg Oral Daily    atorvastatin (Lipitor) tab 10 mg  10 mg Oral Nightly    docusate sodium (Colace) cap 100 mg  100 mg Oral BID     donepezil (Aricept) tab 10 mg  10 mg Oral Nightly    ferrous sulfate DR tab 325 mg  325 mg Oral BID with meals    furosemide (Lasix) tab 40 mg  40 mg Oral Daily    levothyroxine (Synthroid) tab 112 mcg  112 mcg Oral Before breakfast    mirtazapine (Remeron) tab 7.5 mg  7.5 mg Oral Nightly    pantoprazole (Protonix) DR tab 40 mg  40 mg Oral QAM AC    heparin (Porcine) 5000 UNIT/ML injection 5,000 Units  5,000 Units Subcutaneous Q8H ALANNA    ondansetron (Zofran) 4 MG/2ML injection 4 mg  4 mg Intravenous Q6H PRN    [START ON 10/2/2024] cefTRIAXone (Rocephin) 1 g in sodium chloride 0.9% 100 mL IVPB-ADDV  1 g Intravenous Q24H    doxycycline (Vibramycin) cap 100 mg  100 mg Oral 2 times per day    QUEtiapine (SEROquel) tab 25 mg  25 mg Oral Nightly    influenza virus trivalent high dose PF (Fluzone HD) 0.5 mL injection (ages >/= 65 years) 0.5 mL  0.5 mL Intramuscular Prior to discharge    furosemide (Lasix) 10 mg/mL injection 20 mg  20 mg Intravenous Once     Medications Prior to Admission   Medication Sig    QUEtiapine 25 MG Oral Tab Take 1 tablet (25 mg total) by mouth nightly.    atorvastatin 10 MG Oral Tab Take 1 tablet (10 mg total) by mouth nightly.    donepezil 10 MG Oral Tab Take 1 tablet (10 mg total) by mouth nightly.    Potassium Chloride ER 10 MEQ Oral Tab CR Take 1 tablet (10 mEq total) by mouth 2 (two) times daily.    ACETAMINOPHEN 500 MG Oral Tab Take 2 tablets (1,000 mg total) by mouth every 4 (four) hours as needed for Pain. (Patient taking differently: Take 1,300 mg by mouth in the morning and 1,300 mg before bedtime.)    pantoprazole 40 MG Oral Tab EC Take 1 tablet (40 mg total) by mouth every morning before breakfast.    aspirin 81 MG Oral Tab EC Take 1 tablet (81 mg total) by mouth daily.    levothyroxine 112 MCG Oral Tab Take 1 tablet (112 mcg total) by mouth daily.    docusate sodium 100 MG Oral Cap Take 100 mg by mouth 2 (two) times daily.    fluorouracil 5 % External Cream     Vitamin B-12 1000  MCG Oral Tab Take 1 tablet (1,000 mcg total) by mouth daily.       Allergies  Allergies   Allergen Reactions    Codeine RASH    Penicillins RASH     Tolerated zosyn without issue       Review of Systems:   ROS  10 point review of systems completed and negative except as noted.    Physical Exam:   Patient Vitals for the past 24 hrs:   BP Temp Temp src Pulse Resp SpO2 Height Weight   10/01/24 1218 121/64 98.2 °F (36.8 °C) Oral 74 20 98 % -- --   10/01/24 1145 116/63 -- -- 71 -- -- -- --   10/01/24 0846 108/66 97.8 °F (36.6 °C) Oral 70 18 95 % -- --   10/01/24 0626 111/64 97.6 °F (36.4 °C) Axillary 69 20 98 % -- --   10/01/24 0350 -- -- -- 70 -- -- -- --   10/01/24 0159 123/69 97.9 °F (36.6 °C) Axillary 69 20 98 % 5' 5\" (1.651 m) 118 lb 8 oz (53.8 kg)   10/01/24 0130 111/64 -- -- 70 20 92 % -- --   10/01/24 0115 112/67 -- -- 70 22 94 % -- --   10/01/24 0100 120/70 -- -- 70 23 94 % -- --   09/30/24 2330 117/65 -- -- 70 22 95 % -- --   09/30/24 2315 115/71 -- -- 69 24 97 % -- --   09/30/24 2300 119/74 -- -- 71 23 95 % -- --   09/30/24 2245 132/72 -- -- 66 22 95 % -- --   09/30/24 2232 127/69 -- -- 70 18 95 % -- --   09/30/24 2217 134/82 -- -- 64 16 93 % -- --   09/30/24 2202 137/79 -- -- 70 17 93 % -- --   09/30/24 2147 133/80 -- -- 67 26 94 % -- --   09/30/24 2132 126/72 98 °F (36.7 °C) -- 70 22 96 % -- --       Intake/Output:   Last 3 shifts:   Intake/Output                   09/29/24 0700 - 09/30/24 0659 (Not Admitted) 09/30/24 0700 - 10/01/24 0659 10/01/24 0700 - 10/02/24 0659       Intake    P.O.  --  --  0    P.O. -- -- 0    I.V.  --  --  10    I.V. -- -- 10    IV PIGGYBACK  --  100  --    Volume (mL) (cefTRIAXone (Rocephin) 1 g in sodium chloride 0.9% 100 mL IVPB-ADDV) -- 100 --    Total Intake -- 100 10       Output    Urine  --  --  250    Output (mL) (External Urinary Catheter) -- -- 250    Total Output -- -- 250       Net I/O     -- 100 -240          Vent Settings:    Hemodynamic parameters (last 24 hours):     Scheduled Meds:    aspirin  81 mg Oral Daily    atorvastatin  10 mg Oral Nightly    docusate sodium  100 mg Oral BID    donepezil  10 mg Oral Nightly    ferrous sulfate  325 mg Oral BID with meals    furosemide  40 mg Oral Daily    levothyroxine  112 mcg Oral Before breakfast    mirtazapine  7.5 mg Oral Nightly    pantoprazole  40 mg Oral QAM AC    heparin  5,000 Units Subcutaneous Q8H ALANNA    [START ON 10/2/2024] cefTRIAXone  1 g Intravenous Q24H    doxycycline  100 mg Oral 2 times per day    QUEtiapine  25 mg Oral Nightly    furosemide  20 mg Intravenous Once     Continuous Infusions:     Physical Exam:  General: nonverbal, nods to questions.   HEENT: Normocephalic, anicteric sclera, neck supple, no thyromegaly or adenopathy.  Neck: No JVD, carotids 2+, no bruits.  Cardiac: Regular rate and rhythm.  Lungs: +crackles and expiratory wheezes  Abdomen: Soft, non-tender. No organosplenomegally, mass or rebound, BS-present.  Extremities: Without clubbing or cyanosis. 1-2+ pitting edema.    Neurologic: No focal defects  Skin: Warm and dry.     Results:   Laboratory Data:  Lab Results   Component Value Date    WBC 7.4 09/30/2024    HGB 11.6 (L) 09/30/2024    HCT 35.7 (L) 09/30/2024    .0 (L) 09/30/2024    CREATSERUM 1.17 10/01/2024    BUN 26 (H) 10/01/2024     10/01/2024    K 4.5 10/01/2024     10/01/2024    CO2 21.0 10/01/2024     (H) 10/01/2024    CA 9.0 10/01/2024    ALB 3.9 09/30/2024    ALKPHO 63 09/30/2024    TP 6.9 09/30/2024    AST 28 09/30/2024    ALT 8 (L) 09/30/2024    PTT 40.1 (H) 03/28/2021    INR 1.58 (H) 08/09/2021    PTP 18.6 (H) 08/09/2021    T4F 1.27 04/11/2019    TSH 1.280 03/09/2022     05/16/2017    PSA <0.01 11/01/2021    DDIMER 2.54 (H) 09/15/2022    MG 2.3 12/21/2023    TROP <0.045 03/28/2021    CK 61 09/28/2020    B12 1,182 (H) 01/27/2022         Recent Labs   Lab 09/30/24  2141 10/01/24  1236   * 101*   BUN 28* 26*   CREATSERUM 1.22 1.17   CA 9.2 9.0   NA  137 140   K 5.5* 4.5    110   CO2 21.0 21.0     Recent Labs   Lab 09/30/24  2141   RBC 3.71*   HGB 11.6*   HCT 35.7*   MCV 96.2   MCH 31.3   MCHC 32.5   RDW 15.7*   NEPRELIM 5.22   WBC 7.4   .0*       No results for input(s): \"BNPML\" in the last 168 hours.    No results for input(s): \"TROP\", \"CK\" in the last 168 hours.    Imaging:  XR CHEST AP PORTABLE  (CPT=71045)    Result Date: 10/1/2024  CONCLUSION:  1. Low lung volumes and small bilateral pleural effusions with associated basilar atelectasis, with or without superimposed pneumonia.  2. Cardiomegaly with mitral valvular prosthesis, mild pulmonary vascular congestion, and dual chamber pacer device.    A preliminary report was issued by the [facility] Radiology teleradiology service. There are no major discrepancies.   Dictated by (CST): Harley Craft MD on 10/01/2024 at 6:44 AM     Finalized by (CST): Harley Craft MD on 10/01/2024 at 6:48 AM           Thank you for allowing me to participate in the care of your patient.    Yuri Reeder, [facility] [place]

## 2024-10-01 NOTE — PROGRESS NOTES
Mireille Lantigua Patient Status:  Emergency    1934 MRN [id number]   Location [facility] EMERGENCY DEPARTMENT Attending Georges Bean*   Hosp Day # 0 PCP Ezequiel Grullon MD     Cardiology Nocturnal APN Note    Briefly: (Documentation from chart review)     Mireille Lantigua is a 89 y/o male who presented with shortness of breath and generalized weakness and has a PMH/PSH of:       Past Medical History:    Anemia    Negative scopes    Aortic dilatation (HCC)    thoracic aorta - 4.6 cm -repeat CT chest in 2019    Arrhythmia    AFib-pacemaker 2016 [employer]    ATRIAL FIBRILLATION    s/p cardioversion multiple times - now in NSR - Dr. Daniel Jones, and Dr. Stevenson    Bilateral renal cysts    simple    Clostridium difficile diarrhea    2020    Compression fracture    T8, T12    Disorder of thyroid    Essential hypertension    H/O cardiovascular stress test    NM stress testing - unremarkable (2015)    Hearing impairment    bilateral hearing aides    High blood pressure    High cholesterol    History of colonic polyps    Repeat Colonscopy PRN    HLD (hyperlipidemia)    HTN (hypertension)    HYPOTHYROIDISM    since     IFG (impaired fasting glucose)    Osteoarthritis    Osteopenia    but with compression fractures    Pacemaker    Prostate cancer (HCC)    s/p prostatectomy - - Urologist - Dr. Calvert    Pulmonary nodule    1.6 cm - stable x 3 years -no further f/up needed    Pulmonary nodule    new pulm nodule- repeat CT chest in 2018    Sacral fracture (HCC)    Shingles    Thrombocytopenia (HCC)       Primary Cardiologist Graham/Robert    Vital Signs:       10/1/2024     1:15 AM 10/1/2024     1:30 AM   Vitals History   /67 111/64   Pulse 70 70   Resp 22 20   SpO2 94 % 92 %        Labs:   Lab Results   Component Value Date    WBC 7.4 2024    HGB 11.6 2024    HCT 35.7 2024    .0 2024    CREATSERUM 1.22 2024    BUN 28 2024      09/30/2024    K 5.5 09/30/2024     09/30/2024    CO2 21.0 09/30/2024     09/30/2024    CA 9.2 09/30/2024    ALB 3.9 09/30/2024    ALKPHO 63 09/30/2024    BILT 1.0 09/30/2024    TP 6.9 09/30/2024    AST 28 09/30/2024    ALT 8 09/30/2024    TROPHS 56 10/01/2024       Diagnostics:   No results found.    Allergies:  Allergies   Allergen Reactions    Codeine RASH    Penicillins RASH     Tolerated zosyn without issue       Medications:  No current facility-administered medications for this encounter.    Assessment:   Shortness of breath  - Troponin 59>56  - BNP 9830  - PCT 0.54  - Chest x-ray mild edema and infiltrates  - Likely multifactorial with acute on chronic CHF and CAP      Plan:    - Continue to monitor overnight  - Formal Cardiology consult to follow in AM.       CASSIE Lopez  [facility] [place]  10/1/2024  1:43 AM

## 2024-10-02 LAB
ANION GAP SERPL CALC-SCNC: 11 MMOL/L (ref 0–18)
BASOPHILS # BLD AUTO: 0.01 X10(3) UL (ref 0–0.2)
BASOPHILS NFR BLD AUTO: 0.2 %
BUN BLD-MCNC: 28 MG/DL (ref 9–23)
BUN/CREAT SERPL: 24.3 (ref 10–20)
CALCIUM BLD-MCNC: 8.8 MG/DL (ref 8.7–10.4)
CHLORIDE SERPL-SCNC: 108 MMOL/L (ref 98–112)
CO2 SERPL-SCNC: 23 MMOL/L (ref 21–32)
CREAT BLD-MCNC: 1.15 MG/DL
DEPRECATED RDW RBC AUTO: 53.2 FL (ref 35.1–46.3)
EGFRCR SERPLBLD CKD-EPI 2021: 60 ML/MIN/1.73M2 (ref 60–?)
EOSINOPHIL # BLD AUTO: 0.2 X10(3) UL (ref 0–0.7)
EOSINOPHIL NFR BLD AUTO: 3.3 %
ERYTHROCYTE [DISTWIDTH] IN BLOOD BY AUTOMATED COUNT: 15.5 % (ref 11–15)
GLUCOSE BLD-MCNC: 76 MG/DL (ref 70–99)
HCT VFR BLD AUTO: 34.8 %
HGB BLD-MCNC: 11.5 G/DL
IMM GRANULOCYTES # BLD AUTO: 0.01 X10(3) UL (ref 0–1)
IMM GRANULOCYTES NFR BLD: 0.2 %
LYMPHOCYTES # BLD AUTO: 0.83 X10(3) UL (ref 1–4)
LYMPHOCYTES NFR BLD AUTO: 13.8 %
MAGNESIUM SERPL-MCNC: 1.9 MG/DL (ref 1.6–2.6)
MCH RBC QN AUTO: 31.1 PG (ref 26–34)
MCHC RBC AUTO-ENTMCNC: 33 G/DL (ref 31–37)
MCV RBC AUTO: 94.1 FL
MONOCYTES # BLD AUTO: 0.9 X10(3) UL (ref 0.1–1)
MONOCYTES NFR BLD AUTO: 15 %
NEUTROPHILS # BLD AUTO: 4.05 X10 (3) UL (ref 1.5–7.7)
NEUTROPHILS # BLD AUTO: 4.05 X10(3) UL (ref 1.5–7.7)
NEUTROPHILS NFR BLD AUTO: 67.5 %
OSMOLALITY SERPL CALC.SUM OF ELEC: 298 MOSM/KG (ref 275–295)
PLATELET # BLD AUTO: 134 10(3)UL (ref 150–450)
PLATELETS.RETICULATED NFR BLD AUTO: 6.7 % (ref 0–7)
POTASSIUM SERPL-SCNC: 4.1 MMOL/L (ref 3.5–5.1)
RBC # BLD AUTO: 3.7 X10(6)UL
SODIUM SERPL-SCNC: 142 MMOL/L (ref 136–145)
WBC # BLD AUTO: 6 X10(3) UL (ref 4–11)

## 2024-10-02 PROCEDURE — 99222 1ST HOSP IP/OBS MODERATE 55: CPT | Performed by: NURSE PRACTITIONER

## 2024-10-02 RX ORDER — FUROSEMIDE 10 MG/ML
20 INJECTION INTRAMUSCULAR; INTRAVENOUS
Status: DISCONTINUED | OUTPATIENT
Start: 2024-10-02 | End: 2024-10-03

## 2024-10-02 RX ORDER — LORAZEPAM 2 MG/ML
1 CONCENTRATE ORAL EVERY 8 HOURS PRN
Status: DISCONTINUED | OUTPATIENT
Start: 2024-10-02 | End: 2024-10-03

## 2024-10-02 RX ORDER — FERROUS SULFATE 300 MG/5ML
300 LIQUID (ML) ORAL 2 TIMES DAILY WITH MEALS
Status: DISCONTINUED | OUTPATIENT
Start: 2024-10-02 | End: 2024-10-03

## 2024-10-02 RX ORDER — QUETIAPINE FUMARATE 25 MG/1
12.5 TABLET, FILM COATED ORAL NIGHTLY PRN
Status: DISCONTINUED | OUTPATIENT
Start: 2024-10-02 | End: 2024-10-03

## 2024-10-02 RX ORDER — IPRATROPIUM BROMIDE AND ALBUTEROL SULFATE 2.5; .5 MG/3ML; MG/3ML
3 SOLUTION RESPIRATORY (INHALATION)
Status: DISCONTINUED | OUTPATIENT
Start: 2024-10-02 | End: 2024-10-03

## 2024-10-02 RX ORDER — MORPHINE SULFATE 20 MG/ML
5 SOLUTION ORAL
Status: DISCONTINUED | OUTPATIENT
Start: 2024-10-02 | End: 2024-10-03

## 2024-10-02 NOTE — CM/SW NOTE
Anticipated therapy need: Gradual Rehabilitative Therapy    CM submitted CLRC to evaluate patient appropriateness for GILDA - pending responses.    CM sent tentative GILDA referrals via [employer].  PASRR completed - result is queued for review.    Pending CLRC, CM will meet with family to discuss GILDA, determine if agreeable, provide list/confirm choice facility. Patient will require three inpatient midnights to qualify using Medicare benefits.    1244 MDO received for hospice.  CM called [facility] and confirmed with liaison Mita - [facility] is aware of consult and is planning for information meeting with family.    / to remain available for support and/or discharge planning.     Plan: TBD, pending hospice informational meeting    Becca Santiago RN, BSN  Nurse   [phone]

## 2024-10-02 NOTE — CARDIAC REHAB
Order received and Chart review completed, followed by assessment as to appropriateness of patient to receive Cardiac Rehab Education. Patient is not appropriate for rehab education.

## 2024-10-02 NOTE — PROGRESS NOTES
[facility] - CARDIOLOGY PROGRESS NOTE  Mireille Lantigua Patient Status:  Inpatient    1934 MRN [id number]   Location [facility]5W Attending Jessika Kathleen MD   Hosp Day # 1 PCP Ezequeil Grullon MD     Assessment:    Respiratory insufficiency.  Component of CHF.  Improved with diuresis.    2.  Stable blood pressure and renal function.    3.  Permanent atrial fibrillation.  Status post AV node ablation.  Paced rhythm (dependent).    4.  Preserved ejection fraction.      Plan:    Continue IV diuretics another day and reassess tomorrow.      Subjective:  No chest pain or shortness of breath.    Objective:  /66 (BP Location: Right arm)   Pulse 70   Temp 97.7 °F (36.5 °C) (Axillary)   Resp 18   Ht 165.1 cm (5' 5\")   Wt 118 lb 8 oz (53.8 kg)   SpO2 95%   BMI 19.72 kg/m²     Temp (24hrs), Av.1 °F (36.7 °C), Min:97.7 °F (36.5 °C), Max:98.4 °F (36.9 °C)      Intake/Output:    Intake/Output Summary (Last 24 hours) at 10/2/2024 1134  Last data filed at 10/2/2024 0520  Gross per 24 hour   Intake 50 ml   Output 1500 ml   Net -1450 ml       Wt Readings from Last 2 Encounters:   10/01/24 118 lb 8 oz (53.8 kg)   04/10/24 145 lb (65.8 kg)       Physical Exam:    General: Alert and oriented x 3,  No apparent distress.  HEENT: No focal deficits.  Neck: + JVD, carotids 2+ no bruits.  Cardiac: Regular rate and rhythm, S1, S2 normal, no murmur  Lungs: Clear without wheezes, rales, rhonchi.  Normal excursions and effort.  Abdomen: Soft, non-tender.   Extremities: Without clubbing, cyanosis or edema.  Peripheral pulses are 2+.  Skin: Warm and dry.     Labs:  Lab Results   Component Value Date    WBC 6.0 10/02/2024    HGB 11.5 10/02/2024    HCT 34.8 10/02/2024    .0 10/02/2024     Lab Results   Component Value Date    INR 1.58 (H) 2021     Lab Results   Component Value Date     10/02/2024     K 4.1 10/02/2024     10/02/2024    CO2 23.0 10/02/2024    BUN 28 10/02/2024    CREATSERUM 1.15 10/02/2024    GLU 76 10/02/2024    MG 1.9 10/02/2024    CA 8.8 10/02/2024        Lab Results   Component Value Date    TROP <0.045 03/28/2021    TROP <0.045 03/28/2021    TROP 0.028 05/16/2017        Medications:     docusate  100 mg Oral BID    ferrous sulfate  300 mg Oral BID with meals    ipratropium-albuterol  3 mL Nebulization Q4H WA (5 times daily)    furosemide  20 mg Intravenous BID (Diuretic)    aspirin  81 mg Oral Daily    atorvastatin  10 mg Oral Nightly    donepezil  10 mg Oral Nightly    levothyroxine  112 mcg Oral Before breakfast    mirtazapine  7.5 mg Oral Nightly    pantoprazole  40 mg Oral QAM AC    heparin  5,000 Units Subcutaneous Q8H ALANNA    cefTRIAXone  1 g Intravenous Q24H    doxycycline  100 mg Oral 2 times per day    QUEtiapine  25 mg Oral Nightly         Kaiden Stevenson MD  10/2/2024  11:34 AM

## 2024-10-02 NOTE — PLAN OF CARE
Pt referred to [facility], pt's family currently meeting with them. Pt made DNAR.  Problem: SKIN/TISSUE INTEGRITY - ADULT  Goal: Skin integrity remains intact  Description: INTERVENTIONS  - Assess and document risk factors for pressure ulcer development  - Assess and document skin integrity  - Monitor for areas of redness and/or skin breakdown  - Initiate interventions, skin care algorithm/standards of care as needed  Outcome: Progressing  Goal: Incision(s), wounds(s) or drain site(s) healing without S/S of infection  Description: INTERVENTIONS:  - Assess and document risk factors for pressure ulcer development  - Assess and document skin integrity  - Assess and document dressing/incision, wound bed, drain sites and surrounding tissue  - Implement wound care per orders  - Initiate isolation precautions as appropriate  - Initiate Pressure Ulcer prevention bundle as indicated  Outcome: Progressing  Goal: Oral mucous membranes remain intact  Description: INTERVENTIONS  - Assess oral mucosa and hygiene practices  - Implement preventative oral hygiene regimen  - Implement oral medicated treatments as ordered  Outcome: Progressing     Problem: Impaired Functional Mobility  Goal: Achieve highest/safest level of mobility/gait  Description: Interventions:  - Assess patient's functional ability and stability  - Promote increasing activity/tolerance for mobility and gait  - Educate and engage patient/family in tolerated activity level and precautions  Outcome: Not Progressing     Problem: Impaired Swallowing  Goal: Minimize aspiration risk  Description: Interventions:  - Patient should be alert and upright for all feedings (90 degrees preferred)  - Offer food and liquids at a slow rate  - No straws  - Encourage small bites of food and small sips of liquid  - Offer pills one at a time, crush or deliver with applesauce as needed  - Discontinue feeding and notify MD (or speech pathologist) if coughing or persistent  throat clearing or wet/gurgly vocal quality is noted  Outcome: Not Progressing     Problem: Impaired Cognition  Goal: Patient will exhibit improved attention, thought processing and/or memory  Description: Interventions:  Outcome: Not Progressing

## 2024-10-02 NOTE — SLP NOTE
SPEECH DAILY NOTE - INPATIENT    ASSESSMENT & PLAN   ASSESSMENT     SLP f/u for ongoing meal assessment per recommendations of soft bite size solids and mildly thick liquids per speech therapy recommendations. RN reports pt tolerates diet and medication well with no overt clinical overt clinical signs aspiration. RN approves swallowing therapy session. The pt seen at bedside and agreeable to participate in swallowing therapy. Family/caregiver present at the time of therapy.  Pt denies any swallowing challenges. Pt denies any pain.  Pt prefers education via verbal explanation.     Pt positioned upright in 90 degrees in bed and seen with his lunch tray. Pt alert, afebrile, tolerating room air with oxygen status 95% prior to the start of oral trials. SLP verbally reviewed aspiration precautions and safe swallowing compensatory strategies with the patient and his family. Patient/family acknowledged understanding of swallowing precautions.  The family reports the pt is going home with hospice care.  During hospice care plan meeting, the family reports, the MD approved the pt to have thin liquids since he does not like thickened liquids. Diet order has not been changed at this time. Reviewed swallowing precautions with family for when the pt starts taking thin liquids. Improving tolerates on po trials of soft bite size solids, and mildly thick liquids via cup with no overt clinical signs of aspiration (e.g., immediate/delayed throat clear, immediate/delayed cough, wet vocal quality, increased O2 effort) observed across all trials. Pt took very little food/liquid from his lunch tray with 2 bites of food and 1 sip of mildly thick liquids.  The pt declined to take any more trials. Oxygen status remained stable at 92-93% throughout the entire session. Oral/buccal cavities clear of residue at the end of the session. Recommend to continue current diet of soft bite size solids and mildly thick liquids (or per MD's advancement of  diet) with no straws.  Swallowing precautions posted in written format on white board to assist pt with carry over.  The pt was left resting in bed with family present and call light in reach.     PLAN: Speech therapy to sign off case secondary to pt being transitioned to hospice care.  Family education completed on swallowing precautions and strategies.      Diet Recommendations - Solids: Mechanical soft chopped/ Soft & Bite Sized  Diet Recommendations - Liquids: Nectar thick liquids/ Mildly thick    Compensatory Strategies Recommended: Slow rate;Alternate consistencies;Small bites and sips;Multiple swallows;No straws  Aspiration Precautions: Upright position;Small bites and sips;Slow rate;No straw  Medication Administration Recommendations: Crushed in puree    Patient Experiencing Pain: No                Treatment Plan  Treatment Plan/Recommendations: Aspiration precautions    Interdisciplinary Communication: Discussed with RN  Plan posted at bedside  Recommendations posted at bedside            GOALS  Goal #1 The patient will tolerate bite size consistency and mildly-thick liquids without overt signs or symptoms of aspiration with 100 % accuracy over 2 session(s).    The pt is tolerating current diet of soft bite size solids and mildly thick liquids without overt clinical signs of aspiration.  Goal Met   Goal #2 The patient will utilize compensatory strategies as outlined by  BSSE (clinical evaluation) including Slow rate, Small bites, Small sips, No straws, Upright 90 degrees with PRN feeding assistance 90 % of the time across 2 sessions.    Pt and family education completed on swallowing precautions and strategies.  Goal Met     FOLLOW UP  Follow Up Needed (Documentation Required): No    Number of Visits to Meet Established Goals: 0    Session: 1 post BSSE    If you have any questions, please contact     Daisy Corona MS/CCC-SLP  Speech Language Pathologist  [facility]  EXT. [phone]

## 2024-10-02 NOTE — PROGRESS NOTES
Hospice referral received from [facility]. [facility] will follow up with family to set up informational meeting.     Mita Sigala  [facility]  [phone]

## 2024-10-02 NOTE — CONSULTS
[facility]  part of [facility]  Palliative Care Initial Consult Note    Mireille Lantigua Patient Status:  Inpatient    1934 MRN [id number]   Location [facility]5W Attending Jessika Kathleen MD   Hosp Day # 1 PCP Ezequiel Grullon MD     Date of Consult: 10/2/2024  Patient seen at: [facility] Inpatient-room 511    The  Cures Act makes medical notes like these available to patients in the interest of transparency. Please be advised this is a medical document. Medical documents are intended to carry relevant information, facts as evident, and the clinical opinion of the practitioner. The medical note is intended as peer to peer communication and may appear blunt or direct. It is written in medical language and may contain abbreviations or verbiage that are unfamiliar.     Reason for Consultation: Consult ordered by:: JAVIER Spangler for evaluation of Palliative Care needs and Goals of care discussion;Advance care planning / HPOA.    Subjective     History of Present Illness: Mireille Lantigua  is a 89 year old male with history of HFpEF (EF:60-65 %), HTN, hypothyroidism, HLD, osteoporosis, permanent A-fib s/p AVN Ablation; s/p DC-PPM; s/p NOLAN Amulet, CKD-3, prostate CA, h/o lung nodules and chronic dysphagia on modified diet & asp precautions at home.  Patient was admitted on 2024 for increased generalized weakness, worsening cough & progressive dyspnea and falls  .   Work up in our hospital has included imaging of chest x-ray that showed  mild edema and possible pneumonia   Admission  labs showed elevated Pro BNP of 9830 and mildly elevated high sensitivity troponin without significant rise or fall (59, 56, 58)  so felt not indicative of acute MI..ECHO done this admission showed preserved/ unchanged  EF at 60 - 65 % with no wall motion abnormality and other findings w/o significant change    --Pt was admitted for further evaluation and management and was  started on IV antibiotics and infiltrated on gentle diuresis . Palliative care was asked to see pt and family for palliative  care consultation to discusses GOC and thus formulate POC moving forward that would best meet these wishes in the back ground of the pt's advancing age and overall declining functional and cognitive performance   History was obtained from [facility] and several of pt's family members ( his wife and daughters Anisa and Susy) .      Patient is being followed by one  specialist this admission: cardiology   10/1/24 Cardiology Consult   HFPEF, ACUTE ON CHRONIC EXACERBATION  -presents with progressive dyspnea  -labs with elevated proBNP of 9830 (previously 5749 in 6/2023).   -chest imaging with mild edema, possible PNA?                  -started on Abx  -start cautious IV diuresis with lasix 20mg BID                  -strict I/Os                  -closely monitor renal function/lytes  -check TTE  TROPONIN ELEVATION  -mildly elevated high sensitivity troponin without significant rise or fall (59, 56, 58) is not indicative of acute MI  -most likely chronic non-MI related troponin elevation  -further risk stratification with TTE   AF S/P AVN ABLATION  S/p DC-PPM  S/p NOLAN amulet   -ECG V-paced rhythm    Today is day #1 of hospitalization. This is the 1st  hospitalization in the past 10 months.( First for year 2024)  Hospitalization Summary  # 1: 10/3/24 - present acute on chronic HF;asp pneumonia, fall with significant functional & cognitive  decline and falls per family looking for more support at home and not coming back to hospital due to pt not liking to leave home and gets agitated delirium   Other Hospitalizations   Year 2023 12/20--22/23:  SOB, mostly asp  pneumonia over HF  ( pt does not follow modified diet and asp precautions at home)  Year 2022 9/12-17/2022 : SOB/ JIANG ; acute on chronic HF ; acute met encephalopathy/ delirium ; fever r? Asp pneumonia, generalized weakness and chronic Afib        The day before I met I called the daughter  Tram and we st up meeting on 10/2 at 11 am in he Dad's room with her present , her mom and caregiver and her sister Susy by phone     When I entered the room, the patient was initially sleeping but he did wake up about  half way through the meeting. He was lying in bed with HOB elevate and appearing in no acute distress.  Wife, Tara and the hired care giver along with daughter Anisa were present at bedside. Also was a granddaughter ( Susy daughter . Susy was on the phone as she has been staying away as he has a cold and did not wan to come in and give anything to her dad. WE reviewed  Grzegorz's illness trajectory and the family's  concerns and wishes. Please see full discussion below      Review of Systems/ Symptoms:   Patient was not able  to provide subjective input. Assessment is assisted by RN and pt's family      Fatigue:  Yes, ongoing weakness and inability to  any longer go up and down the stairs with assistance. He is nodding off more then he is awake and alert . If not stimulated one will find him sleeping    Overall Functional status: been declining for some time but worst over the last few weeks started over the last few months. Per daughter - pt needs a lot more assistance and cueing to get any task done    Dyspnea: denies  At rest: no ;  JIANG:  more general fatigue       Current O2 therapy: room air   Drowsiness:  really just nods off if not talking to him and at times feel does so when you are talking to him. Much less engaged in things around him   Cough:  present and reason came in occurring more often and distressful at time ; dry to productive and had hard time clearing it up    Pain: denies  Non-verbal signs of pain present: No at rest but does show some grimacing with movement and activity   Appetite/Nutritional Status: very variable  overall poor ; (+) family report weight loss  BMI 19.  needs a lot more assistance and encouragement . (+)  signs on physical exam   Dysphagia: been chronic issue was to follow modified diet and asp strategies  at home  at times pt declines doing so   Constipation: Last BM at home day of admission . On Colace at home  Need to monitor   Diarrhea:  caregiver denies / none documented here   Nausea/Vomiting:  caregiver denies / none documented here   Depression: Family  says no - no meds   Anxiety:  more agitation and irritation nocturnal Seroquel at night ; Had started Alprazolam too but left hang over into  day so stopped it   Emotional / coping response to illness: Family just wants pt to be at home as much as possible this is where he is most comfortable. IN hospital always get delirium.  Family wants to try to balance  symptom control and not oversedate ; reviewed that is goal but comes  time not kaur to do this and  safety is bid factor. Also if pt distressed that is not good for anyone  pt family or caregivers- all needs rest sleep respite      Other:      Symptoms(s): Agitation;Confusion;Cough;Dyspnea;Fatigue    Bowel Movement    No data found in the last 1 encounters.         Palliative Care Social History:   Marital Status:  x 70 yrs; Couple has known each over since grade school - grew up in [place] in [place] so for ~ 80 yrs   Children: Yes  3 adopted children 2 daughters alive ; 1 son passed at age 39 from heart disease    Living Situation Prior to Admit: Home with 24/7 private duty care giver   Does Patient Live Alone: No  Is Patient Confused: Yes  Occupational History: Retired pt was longtime highschool teacher and  at [place] [place] [employer]     Substance History:   reports that he has quit smoking. His smoking use included cigarettes. He has never used smokeless tobacco.  reports that he does not currently use alcohol.  reports no history of drug use.  Hx of Substance Use/Abuse: No    Spiritual Assessment:   Denominational - Parish Not Listed  Family /pt declines   visit    Past Medical History/Past Surgical History:     Medical History: obtained from [facility]  Past Medical History:    Anemia    Negative scopes    Aortic dilatation (HCC)    thoracic aorta - 4.6 cm -repeat CT chest in 8/2019    Arrhythmia    AFib-pacemaker July 2016 [facility]    ATRIAL FIBRILLATION    s/p cardioversion multiple times - now in [facility] - Dr. Daniel Jones, and Dr. Stevenson    Bilateral renal cysts    simple    Clostridium difficile diarrhea    7/2020    Compression fracture    T8, T12    Disorder of thyroid    Essential hypertension    H/O cardiovascular stress test    NM stress testing - unremarkable (9/2015)    Hearing impairment    bilateral hearing aides    High blood pressure    High cholesterol    History of colonic polyps    Repeat Colonscopy PRN    HLD (hyperlipidemia)    HTN (hypertension)    HYPOTHYROIDISM    since 2008    IFG (impaired fasting glucose)    Osteoarthritis    Osteopenia    but with compression fractures    Pacemaker    Prostate cancer (HCC)    s/p prostatectomy -2003 - Urologist - Dr. Calvert    Pulmonary nodule    1.6 cm - stable x 3 years -no further f/up needed    Pulmonary nodule    new pulm nodule- repeat CT chest in 7/2018    Sacral fracture (HCC)    Shingles    Thrombocytopenia (HCC)     Past Surgical History:   Procedure Laterality Date    Ablation      a-fib ablation -> 2008    Back surgery      kyphoplasty - 7/72012    Cardiac pacemaker placement      July 2016 last checked 4/2017    Colonoscopy N/A 09/20/2017    Procedure: COLONOSCOPY, POSSIBLE BIOPSY, POSSIBLE POLYPECTOMY 84499;  Surgeon: Luda Phelps MD;  Location: [facility]    Colonoscopy,biopsy  09/12/2011    Performed by LUDA PHELPS at [facility]    Colonoscopy,biopsy  10/17/2012    Procedure: COLONOSCOPY, POSSIBLE BIOPSY, POSSIBLE POLYPECTOMY 82560;  Surgeon: Luda Phelps MD;  Location: [facility]    Colonoscopy,remv lesn,snare  10/17/2012    Procedure: COLONOSCOPY, POSSIBLE  BIOPSY, POSSIBLE POLYPECTOMY 80909;  Surgeon: Jamari Phelps MD;  Location: [facility]    Genital surg proc,male unlisted      Prostatectomy - 2003    Hc kyphoplasty lumbar      Hernia surgery  04/21/2017    Hip replacement surgery      L hip replacement - 1994    Laparoscopy, surgical prostatectomy, retropubic radical, w/nerve sparing      Other  04/19/2018    pacer left lead revision    Other surgical history      cardioversion multiple    Other surgical history      HH 4/20 - L total hip replacement    Other surgical history      s/p cardiac ablation/and pacemaker 7/20/2016    Pacemaker monitor  07/2016    Patient documented not to have experienced any of the following events  10/17/2012    Procedure: COLONOSCOPY, POSSIBLE BIOPSY, POSSIBLE POLYPECTOMY 22526;  Surgeon: Jamari Phelps MD;  Location: [facility]    Patient withough preoperative order for iv antibiotic surgical site infection prophylaxis.  10/17/2012    Procedure: COLONOSCOPY, POSSIBLE BIOPSY, POSSIBLE POLYPECTOMY 43221;  Surgeon: Jamari Phelps MD;  Location: [facility]    Total hip replacement      left hip    Upper gi endoscopy,biopsy  09/12/2011    Performed by JAMARI PHELPS at [facility]       Family History: obtained from Psychiatric  Family History   Problem Relation Age of Onset    Hypertension Father     Diabetes Mother     Obesity Mother     Heart Disorder Mother         MI    Heart Disease Mother         CAD    Cancer Brother         Brain cancer       Allergies:  Allergies   Allergen Reactions    Codeine RASH    Penicillins RASH     Tolerated zosyn without issue       Medications:     Current Facility-Administered Medications:     docusate (Colace) 50 MG/5ML oral solution 100 mg, 100 mg, Oral, BID    ferrous sulfate 300 (60 Fe) MG/5ML oral syrup 300 mg, 300 mg, Oral, BID with meals    ipratropium-albuterol (Duoneb) 0.5-2.5 (3) MG/3ML inhalation solution 3 mL, 3 mL, Nebulization, Q4H WA (5 times  daily)    furosemide (Lasix) 10 mg/mL injection 20 mg, 20 mg, Intravenous, BID (Diuretic)    acetaminophen (Tylenol Extra Strength) tab 500 mg, 500 mg, Oral, Q4H PRN    ALPRAZolam (Xanax) tab 0.25 mg, 0.25 mg, Oral, Nightly PRN    aspirin DR tab 81 mg, 81 mg, Oral, Daily    atorvastatin (Lipitor) tab 10 mg, 10 mg, Oral, Nightly    donepezil (Aricept) tab 10 mg, 10 mg, Oral, Nightly    levothyroxine (Synthroid) tab 112 mcg, 112 mcg, Oral, Before breakfast    mirtazapine (Remeron) tab 7.5 mg, 7.5 mg, Oral, Nightly    pantoprazole (Protonix) DR tab 40 mg, 40 mg, Oral, QAM AC    heparin (Porcine) 5000 UNIT/ML injection 5,000 Units, 5,000 Units, Subcutaneous, Q8H ALANNA    ondansetron (Zofran) 4 MG/2ML injection 4 mg, 4 mg, Intravenous, Q6H PRN    cefTRIAXone (Rocephin) 1 g in sodium chloride 0.9% 100 mL IVPB-ADDV, 1 g, Intravenous, Q24H    doxycycline (Vibramycin) cap 100 mg, 100 mg, Oral, 2 times per day    QUEtiapine (SEROquel) tab 25 mg, 25 mg, Oral, Nightly    influenza virus trivalent high dose PF (Fluzone HD) 0.5 mL injection (ages >/= 65 years) 0.5 mL, 0.5 mL, Intramuscular, Prior to discharge    Functional Status History:  ADLs: bathing or showering, dressing, getting in and out of bed or a chair, walking, using the toilet, and eating  - Dependent  IADLs: use the phone, shop for groceries, meal preparation, manage medicines, clean living area, use transportation by self, manage money  - Dependent  DME: Unsure; Did not have hospital bed or nikita lift ; Had walker and wheelchair   Falls: Yes    Palliative Performance Scale:   Prior to admission: (pt/family reported) 50 %  Observed during hospitalization: 30 %  % Ambulation Activity Level Self-Care Intake Consciousness   100 Full  Normal  No Disease Full Normal Full   90 Full  Normal  Some Disease Full Normal Full   80 Full  Normal w/effort  Some Disease Full Normal or reduced Full   70 Reduced  Can't Perform Job  Some Disease Full Normal or reduced Full   60 Reduced   Can't Perform Hobby   Significant Disease Occ Assist Normal or reduced Full or confused   50 Mainly sit/lie Can't do any work  Extensive Disease Partial Assist Normal or reduced Full or confused   40 Mainly in bed Can't do any work  Extensive Disease Mainly Assist Normal or reduced Full or confused   30 Bed Bound Can't do any work  Extensive Disease Max Assist  Total Care Reduced  Drowsy/confused   20 Bed Bound Can't do any work  Extensive Disease Max Assist  Total Care Minimal  Drowsy/confused   10 Bed Bound Can't do any work  Extensive Disease Max Assist  Total Care Mouth Care  Drowsy/confused   0 Death        Objective      Vital Signs:  Blood pressure 125/66, pulse 70, temperature 97.7 °F (36.5 °C), temperature source Axillary, resp. rate 18, height 5' 5\" (1.651 m), weight 118 lb 8 oz (53.8 kg), SpO2 95%.  Body mass index is 19.72 kg/m².  Present Level of pain: denies when in bed   Non-verbal signs of pain present: Yes    Physical Exam:  General: Alert & Awake. In no apparent respiratory distress. Body habitus Thin il and frail appearing   HEENT: AT/NC Bitemporal muscle wasting Dry MM dry lips ; poor dentition [place] bilateral   Cardiac:  Irregular  ; On tele shows V-paced   Lungs: Bilateral course breath sounds- diminished at based Normal effort on RA  Abdomen: Soft, non-tender, non-distended, hypoactive BS  Extremities: 1+ LE edema present.Status dermatitis skin changes bilat LEs (+) Sarcopenia  Neurologic: Alert and oriented to person . No gross focal deficits. Melvin. No myoclonus, tremors or seizures. Slow r response time ; [place]   Psychiatric: Mood flat affect   Skin: Pale, warm and dry  No rash; scattered skin tears and bruising on bilat UEs    Hematology:  Lab Results   Component Value Date    WBC 6.0 10/02/2024    HGB 11.5 (L) 10/02/2024    HCT 34.8 (L) 10/02/2024    .0 (L) 10/02/2024     Coags:  Lab Results   Component Value Date    INR 1.58 (H) 08/09/2021    PTT 40.1 (H) 03/28/2021     Chemistry:  Lab  Results   Component Value Date    CREATSERUM 1.15 10/02/2024    BUN 28 (H) 10/02/2024     10/02/2024    K 4.1 10/02/2024     10/02/2024    CO2 23.0 10/02/2024    GLU 76 10/02/2024    CA 8.8 10/02/2024    ALB 3.9 09/30/2024    ALKPHO 63 09/30/2024    BILT 1.0 (H) 09/30/2024    TP 6.9 09/30/2024    AST 28 09/30/2024    ALT 8 (L) 09/30/2024    PSA <0.01 11/01/2021    DDIMER 2.54 (H) 09/15/2022    MG 1.9 10/02/2024    TROP <0.045 03/28/2021       Imaging:      10/1/24 XR CHEST AP PORTABLE  (CPT=71045)  CONCLUSION:         1. Low lung volumes and small bilateral pleural effusions with associated basilar atelectasis, with or without superimposed pneumonia.  2. Cardiomegaly with mitral valvular prosthesis, mild pulmonary vascular congestion, and dual chamber pacer device.    A preliminary report was issued by the [facility] teleradiology service. There are no major discrepancies.   Dictated by (CST): Harley Craft MD on 10/01/2024 at 6:44 AM     Finalized by (CST): Harley Craft MD on 10/01/2024 at 6:48 AM          10/1/24 Cardiac ECHO TTE    ECG rhythm:   Ventricular paced rhythm   ----------------------------------------------------------------------------   Conclusions:  1. Left ventricle: The cavity size was normal. Wall thickness was mildly      increased. Systolic function was normal. The estimated ejection fraction      was 60-65%, by biplane method of disks. Wall motion is normal; there are      no regional wall motion abnormalities. Unable to assess LV diastolic      function due to heart rhythm.   2. Right ventricle: The cavity size was increased. Pacer C/W ICD noted in      the right ventricle. Systolic function was mildly reduced.   3. Left atrium: The left atrial volume was markedly increased.   4. Right atrium: The atrium was markedly dilated. Pacer C/W ICD noted in      right atrium.   5. Aortic valve: There was mild to moderate regurgitation.   6. Aortic root: The aortic root was  dilated and 4.8cm diameter.   7. Ascending aorta: The ascending aorta was dilated and 4.5cm diameter.   8. Mitral valve: There was mild regurgitation.   Impressions:  No previous study was available for comparison.   *     Summary of Discussion      I discussed reason for palliative care consultation with several family members present-wife Tara,daughter Anisa in person,, Susy's daughter ( pt's granddaughter) and care giver and daughter Susy by phone     I differentiated the palliative treatment-focus model versus the hospice comfort-focused philosophy of care. I informed the patient/family that having palliative care support does not limit medical treatment options or decisions to those who wish to continue curative or restorative medical therapies. I discussed the benefits of palliative care to include assistance with arising symptom management needs, an extra layer of support, to ensure GOC are respected throughout healthcare continuum, and assist with transition to hospice care when appropriate.      Outpatient(office setting /Community Palliative Care Services( home or facility) :  Usually visit once every 4- 6  weeks  Focus on GOC and symptom management   Palliative Care criteria:  Serious illness  Not altered by prognosis   Does not limit curative or restorative therapies, but complement to usual care      Hospice services:  24/7 phone triage services available and prn nurse visits also available    Interdisciplinary team: RN as CM, HHA, SW, , volunteers and some have complementary services (music therapy, massage etc)    Visits are based on need/ no limits- can range from one or more times per week   Hospice criteria:  Prognosis: six-month or less if disease takes  it's natural course   Life-prolonging measures/treatments are forgone; identified as no longer helpful or align with comfort goals        Focus solely on comfort / symptom management   Involves consent process to enroll into hospice  benefit with specific agency      I provided brief overview of Medicare hospice benefit along with hospice philosophy and answered all questions. At this time, Mireille Lantigua condition does qualify for hospice services and they wish  to meet with hospice for  information and evaluation and most likely to enroll in their services.  Family is very much  looking  to discharge pt as soon as medically cleared as they know the more Mireille is in the hospital the more he gets confused and agitated and the longer it takes  for him to get readjusted     PROGNOSTIC AWARENESS/UNDERSTANDING: Good.   We discussed the disease trajectory of HF, dysphagia, asp pneumonia, his advanced age  with his overall medical  frailty & FTT and ongoing decline and the associated symptoms and decline over time.that one is likely to see . I did review the concept of a \" honey moon period with hospice\"    We discussed current clinical condition and overall  limited ( weeks/months   prognosis per clinical team. I reviewed those with heart disease do have higher risk of sudden death over other diseases so as not to be surprised and some of my families see this as a blessing.      HOPES/GOALS:   Dad goes home and is kaur to stay home with more support then he already has . Mor support for my mom and the caregiver too   Want my dad to  die peacefully at home where he loves to be     FEARS/CONCERNS:   The care givers can handle all this and do not abandon us due to this all being too much. ( I encouraged keeping open and honest communication and it is good they do have 2 rotating care givers)     PROVIDED EMOTIONAL SUPPORT TO Family.and caregiver through active listening as they shared the pt's illness journey and his life story as a  and  . I provided supportive education and guidance     Advance Care Planning counseling and discussion:   HCPOA Documentation Pt Reports completion but not in Epic. Healthcare Agent's Name: Anisa   Debbie, which is pt's Daughter Wife Tara confirms this verbally   CODE STATUS DISCUSSION:  We discussed the risks & potential harm > benefits of life sustaining treatment of resuscitation efforts  in the setting of pt's advanced age and all his chronic health issues with his family present . Discussed the appropriateness of limit setting in medical interventions at this time    POLST FORM: Will leave hospice to address POLST for DNAR comfort     CODE STATUS: DNAR/Select order placed  for now while pt remains in the hospital     Assessment and Recommendations        Palliative Care encounter    Counseling regarding goals of care an advance care planning     Acute on chronic congestive heart failure ( HFpEF)     Aspiration Pneumonia     Alzheimer's Dementia     Skin tear of left forearm without complication, initial encounter s/p fall    Hyperkalemia    Elevated troponin- demand ischemia    Chronic Oropharyngeal Dysphagia-  on modified diet     CKD Stage 3    Hypothyroidism     Permanent Atrial Fibrillation s/p AVN Ablation; s/p DC-PPM; s/p NOLAN Amulet     Goals of care: established continue with medical optimization through this admission with plans for discharge home with hospice care when ready for discharge. Hospice meeting to be set up for 10/3  Hospice order placed and I spoke and gave report to hospice team   Code Status: Full Code--------------->  Changed to DNAR/ Selective ; since pt likely home with hospice will have POLST completed with  hospice as this will be final destination   Healthcare Agent's Name: Anisa Lewis which is pt's daughter . Wife Tara Confirms . Pt. likely to discharge in am. Discussed with Anisa  if can bring copy  to please do so  Her contact is [phone]      -Discussed today's visit with:  Family, RN, and Care team.    Palliative Care Follow Up: Palliative care team will Continue to follow while inpatient.  Palliative care follow up outpatient: is Not indicated.Hospice referral  order placed and [facility] will reach out to daughter to set up time for hospice meeting     Thank you for allowing Palliative Care services to participate in the care of Mireille Lantigua.    A total of 55 minutes were spent on this consult, which included all of the following: chart review, direct face to face contact, history taking, physical examination, counseling and coordinating care, and documentation.     Lynn Urban, APRN  10/2/2024  Palliative Care Services at [facility] :  extension [phone]   or [phone]

## 2024-10-02 NOTE — DIETARY NOTE
Brief Nutrition Note:    Pt screened for decreased appetite and unintentional weight loss (MST 3). Chart review completed and noted plans for hospice informational meeting. Nutritional assessment not warranted at this time. Will monitor for changes in medical status and plans; will adjust nutritional plans accordingly.    Bela Davis MS, BRYAN, RDN, LDN  Clinical Dietitian  P: [phone]

## 2024-10-02 NOTE — PLAN OF CARE
Problem: SKIN/TISSUE INTEGRITY - ADULT  Goal: Skin integrity remains intact  Description: INTERVENTIONS  - Assess and document risk factors for pressure ulcer development  - Assess and document skin integrity  - Monitor for areas of redness and/or skin breakdown  - Initiate interventions, skin care algorithm/standards of care as needed  Outcome: Progressing     Problem: Impaired Swallowing  Goal: Minimize aspiration risk  Description: Interventions:  - Patient should be alert and upright for all feedings (90 degrees preferred)  - Offer food and liquids at a slow rate  - No straws  - Encourage small bites of food and small sips of liquid  - Offer pills one at a time, crush or deliver with applesauce as needed  - Discontinue feeding and notify MD (or speech pathologist) if coughing or persistent throat clearing or wet/gurgly vocal quality is noted  Outcome: Progressing     Problem: Impaired Cognition  Goal: Patient will exhibit improved attention, thought processing and/or memory  Description: Interventions:  - Allow additional time for processing after asking questions or providing instructions  Outcome: Progressing

## 2024-10-02 NOTE — PROGRESS NOTES
[facility] AND CARE   Progress Note  -  Mireille Lantigua Patient Status:  Inpatient    1934 MRN [id number]   Location [facility]5W Attending Jessika Kathleen MD   Hosp Day # 1 PCP Ezequiel Grullon MD     PCP: Ezequiel Grullon MD      SEE ATTENDING NOTE AT BOTTOM OF PAGE    Is this a shared or split note between Advanced Practice Provider and Physician? Yes    Assessment and Plan:    1-year-old gentleman with history of hypothyroidism, hypertension, dyslipidemia, osteoporosis, paroxysmal atrial fibrillation,  prostate cancer, atrial fibrillation on Xarelto, PEm, status post permanent pacemaker, Alzheimer's disease, lung nodules, CKD stage 3, skin cancer, MGUS, chroni ITP, OA, oropharyngeal dysphagia, Dilated aorta,  chronic diastolic chf  who presents  weakness, cough, sob and falls, pt found to have acute on chronic diastolic and aspiration pna. Cards and palliative care consulted, see below for details       Acute on Chronic Diastolic CHF  - echo with EF 61% with indeterminant diastolic dysfunction, mild to moderate MR, biatrial enlargement, small atrial septal defect dilated aorta  -CXR with chf and small B/L pleural effusions  with atelectasis ? PNA  -pro bnp 9830  -echo pending   -iv lasix 20 mg bid   -daily wts, Cr, I/O  -as per cards      Aspiration PNA  -not compliant with modified diet at home  -afebrile. WBC normal. PCT 0.54  -CXR with chf and small B/L pleural effusions  with atelectasis ? PNA  -modified diet per speech  -add scheduled nebs   -abx-doxy and ceftriaxone  -rec DNR/DNI with diet noncompliance as well as palliative care and to consider hospice, wife willing to consider, palliative care to see 10/2     S/P Fall   Left Forearm Laceration   -s/p steri strips and dermabond in ER  - S/P TDAP  -PT/OT      Elevated troponin- demand ischemia, Type II MI  -troponin 59-->56-->58  -continue ASA and statin  -repeat echo pending  -as per cards     Thrombocytopenia  -slight drop  in platelets to 134  -follow      Oropharyngeal Dysphagia   -modified diet   -speech to follow      CKD Stage 3   -stable      Permanent Atrial Fibrillation S/P Watchman  S/P PPM   HL  -EKG with V-pacing   -continue asa and statin      Alzheimers Dementia   -continue home meds-mirtazapine      Hypothyroidism  -continue synthoid      GOC  -full code  -wife Tara at  has daughters Susy and Anisa  -24 hour caretaker   -palliative care consult      MA/ACO Reach  -ER Visits 2024: 0  -Admissions 2024: 1  -7 Day Re- Entry: NO  -30 Day Re- Entry: NO  -Consults: cards and palliative care   -Discharge Needs: TBD  -Appointments: [ ] PCP Ezequiel Grullon MD     FEN  -lytes in am  -diet-modifed diet      Prophy  -SCD  -heparin     Dispo  -pending clinical course  PCP: Ezequiel Grullon MD      Outpatient records or previous hospital records reviewed.   Further recommendations pending patient's clinical course.  [facility] hospitalist  team to continue to follow patient while in house  Concerns regarding plan of care were discussed with patient. Patient agrees with plan as detailed above. Discussed plan of care with       Note: This chart was prepared using voice recognition software and may contain unintended word substitution errors.      Jessica Hooper RN, NP  [facility] Hospitalist Team   Available via Perfect Serve or Bubble Chat (check Availability)    10/2/2024    SUBJECTIVE:   Daughter and her boyfriend at bedside.  Patient is awake but thinks he is at home.  Does not know who his granddaughter is.  Denies shortness of breath or chest pain but does have audible wheezing.  Complains of some pain in his leg but he is unsure which 1.  Plans for palliative care meeting today at 11 AM      OBJECTIVE:   Blood pressure 125/66, pulse 70, temperature 97.7 °F (36.5 °C), temperature source Axillary, resp. rate 18, height 5' 5\" (1.651 m), weight 118 lb 8 oz (53.8 kg), SpO2 95%.    GENERAL: resting and calm in  bed   NEURO: A/A Ox1  RESP: slightly labored, mild wheezing especially to right lung  CARDIO: Regular,   ABD: soft, NT, ND, BS+  : primofit   EXTREMITIES: venous stasis changes, RLE swelling >Left, ecchymosis to legs, right leg slightly warm to touch    DIAGNOSTIC DATA:   Labs:       Lab Results   Component Value Date    WBC 6.0 10/02/2024    HGB 11.5 10/02/2024    HCT 34.8 10/02/2024    .0 10/02/2024    CREATSERUM 1.15 10/02/2024    BUN 28 10/02/2024     10/02/2024    K 4.1 10/02/2024     10/02/2024    CO2 23.0 10/02/2024    GLU 76 10/02/2024    CA 8.8 10/02/2024    MG 1.9 10/02/2024           MEDICATIONS       docusate  100 mg Oral BID    ferrous sulfate  300 mg Oral BID with meals    ipratropium-albuterol  3 mL Nebulization Q4H WA (5 times daily)    furosemide  20 mg Intravenous BID (Diuretic)    aspirin  81 mg Oral Daily    atorvastatin  10 mg Oral Nightly    donepezil  10 mg Oral Nightly    levothyroxine  112 mcg Oral Before breakfast    mirtazapine  7.5 mg Oral Nightly    pantoprazole  40 mg Oral QAM AC    heparin  5,000 Units Subcutaneous Q8H ALANNA    cefTRIAXone  1 g Intravenous Q24H    doxycycline  100 mg Oral 2 times per day    QUEtiapine  25 mg Oral Nightly         acetaminophen    ALPRAZolam    ondansetron    influenza virus vaccine PF    Jessica Hooper, NP      IMAGING     US VENOUS DOPPLER LEG RIGHT - DIAG IMG (CPT=93971)    Result Date: 10/1/2024  CONCLUSION:  1. Negative right leg venous duplex exam. 2. No deep venous thrombosis.    Dictated by (CST): Gonzalo Sánchez MD on 10/01/2024 at 4:23 PM     Finalized by (CST): Gonzalo Sánchez MD on 10/01/2024 at 4:24 PM          XR CHEST AP PORTABLE  (CPT=71045)    Result Date: 10/1/2024  CONCLUSION:  1. Low lung volumes and small bilateral pleural effusions with associated basilar atelectasis, with or without superimposed pneumonia.  2. Cardiomegaly with mitral valvular prosthesis, mild pulmonary vascular congestion, and dual  chamber pacer device.    A preliminary report was issued by the [facility] teleradiology service. There are no major discrepancies.   Dictated by (CST): Harley Craft MD on 10/01/2024 at 6:44 AM     Finalized by (CST): Harley Craft MD on 10/01/2024 at 6:48 AM             SEE ATTENDING NOTE BELOW:     Patient examined and assessed independently. Agree with above APN assessment.      S: feels about the same, tired. Slept ok overnight. Family met with palliative care NP this AM, plans to meet with hospice team.      Objective  /66 (BP Location: Right arm)   Pulse 70   Temp 97.7 °F (36.5 °C) (Axillary)   Resp 18   Ht 5' 5\" (1.651 m)   Wt 118 lb 8 oz (53.8 kg)   SpO2 95%   BMI 19.72 kg/m²      Exam:  GEN: elderly male in NAD  HEENT: EOMI  Pulm: decreased breath sounds at bases  CV: RRR, no murmurs   ABD: Soft, non-tender, non-distended, +BS  SKIN: warm, dry, chronic venous stasis   EXT: 1-2+ pitting edema R> L     Assessment/Plan     89-year-old gentleman with history of hypothyroidism, hypertension, dyslipidemia, osteoporosis, paroxysmal atrial fibrillation,  prostate cancer, atrial fibrillation on Xarelto, PEm, status post permanent pacemaker, Alzheimer's disease, lung nodules, CKD stage 3, skin cancer, MGUS, chroni ITP, OA, oropharyngeal dysphagia, Dilated aorta,  chronic diastolic chf  who presents  weakness, cough, sob and falls, pt found to have acute on chronic diastolic and aspiration pna. Cards and palliative care consulted, see below for details       Acute on Chronic Diastolic CHF  Aspiration PNA  S/P Fall   Left Forearm Laceration   Hyperkalemia  Elevated troponin- demand ischemia, Type II MI  Oropharyngeal Dysphagia   CKD Stage 3   Permanent Atrial Fibrillation S/P Watchman  S/P PPM   HL  Alzheimers Dementia   Hypothyroidism  GOC  -full code  -wife Tara at BS has daughters Susy and Anisa  -24 hour caretaker   -palliative care consult      Plan  - continue IV abx,  ceftriaxone/doxycycline  - cardiology consulted, continue IV lasix  - palliative care consulted, plan for hospice eval    Dispo: likely home with hospice pending family meeting     Rest as above.     Jessika Kathleen MD  [facility] hospitalist

## 2024-10-03 VITALS
HEIGHT: 65 IN | HEART RATE: 71 BPM | RESPIRATION RATE: 22 BRPM | SYSTOLIC BLOOD PRESSURE: 126 MMHG | OXYGEN SATURATION: 94 % | BODY MASS INDEX: 19.74 KG/M2 | DIASTOLIC BLOOD PRESSURE: 74 MMHG | WEIGHT: 118.5 LBS | TEMPERATURE: 99 F

## 2024-10-03 RX ORDER — FUROSEMIDE 40 MG
40 TABLET ORAL DAILY
Status: SHIPPED | COMMUNITY
Start: 2024-10-03

## 2024-10-03 NOTE — PROGRESS NOTES
Progress Note  Mireille Lantigua Patient Status:  Inpatient    1934 MRN [id number]   Location [facility]5W Attending Georges Gill DO   Hosp Day # 2 PCP Ezequiel Grullon MD     SUBJECTIVE:    Denies chest pain, says his breathing feels comfortable.     VITALS:  /68 (BP Location: Left arm)   Pulse 73   Temp 98.8 °F (37.1 °C) (Axillary)   Resp 18   Ht 5' 5\" (1.651 m)   Wt 118 lb 8 oz (53.8 kg)   SpO2 92%   BMI 19.72 kg/m²     INTAKE/OUTPUT:    Intake/Output Summary (Last 24 hours) at 10/3/2024 0855  Last data filed at 10/3/2024 0554  Gross per 24 hour   Intake 90 ml   Output 1800 ml   Net -1710 ml     Last 3 Weights   10/01/24 0159 118 lb 8 oz (53.8 kg)   04/10/24 0640 145 lb (65.8 kg)   24 1521 145 lb (65.8 kg)   24 1316 145 lb (65.8 kg)     LABS:  Recent Labs   Lab 24  2141 10/01/24  1236 10/02/24  0508   * 101* 76   BUN 28* 26* 28*   CREATSERUM 1.22 1.17 1.15   EGFRCR 56* 59* 60   CA 9.2 9.0 8.8    140 142   K 5.5* 4.5 4.1    110 108   CO2 21.0 21.0 23.0     Recent Labs   Lab 09/30/24  2141 10/02/24  0508   RBC 3.71* 3.70*   HGB 11.6* 11.5*   HCT 35.7* 34.8*   MCV 96.2 94.1   MCH 31.3 31.1   MCHC 32.5 33.0   RDW 15.7* 15.5*   NEPRELIM 5.22 4.05   WBC 7.4 6.0   .0* 134.0*     No results for input(s): \"TROP\", \"CK\" in the last 168 hours.    DIAGNOSTICS:    TELEMETRY: Paced    ROS: Negative unless noted above     PHYSICAL EXAM:  General: Alert and oriented x 2-3. Ill appearing  HEENT: Normocephalic, sclera are nonicteric. Hearing appropriate bilaterally.  Neck: No JVD or Carotid bruits. Trachea midline.   Cardiac: Regular rate and rhythm. S1, S2 auscultated. No murmurs, rubs, or gallops appreciated.   Lungs: a/p dull. dullness. Chest expansion symmetrical. Regular effort.  Abdomen: Soft, non-tender, +BS. No hepatosplenomegaly or appreciable masses.   Extremities: Without clubbing, cyanosis or edema.  Peripheral pulses are 2+.  Neurologic: Motor and  sensory nerves grossly intact.   Psych: flat  Skin: pale and cool     MEDICATIONS:   docusate  100 mg Oral BID    ferrous sulfate  300 mg Oral BID with meals    ipratropium-albuterol  3 mL Nebulization Q4H WA (5 times daily)    furosemide  20 mg Intravenous BID (Diuretic)    aspirin  81 mg Oral Daily    atorvastatin  10 mg Oral Nightly    donepezil  10 mg Oral Nightly    levothyroxine  112 mcg Oral Before breakfast    pantoprazole  40 mg Oral QAM AC    heparin  5,000 Units Subcutaneous Q8H ALANNA    cefTRIAXone  1 g Intravenous Q24H    doxycycline  100 mg Oral 2 times per day    QUEtiapine  25 mg Oral Nightly     ASSESSMENT:    Acute on Chronic HFpEF  - proBNP 9830, ECHO with LVEF 60-65%, no WMA  - IV lasix 20 mg BID, Appears compensated     Aspiration PNA- ABX per primary   Permanent Afib, s/p AVN Ablation; s/p DC-PPM; s/p NOLAN Amulet - Stable without rate controlling medications   Elevated Troponin- Flat trend likely secondary to demand ischemia; Type II MI    CKD III- Stable   Acute on Chronic Anemia- Stable     PLAN:  - Continue IV Lasix while inpatient, will be going home with home hospice this afternoon. At home will do Lasix 20 mg PO BID     Plan of care discussed with patient and RN.     Bea Escoto, CASSIE  10/3/2024  8:55 AM  [phone] ([place])  [phone] ([place])

## 2024-10-03 NOTE — DISCHARGE SUMMARY
[facility] Hospitalist Discharge Summary   Patient ID:  Mireille Lantigua  [id number]  89 year old  7/1/1934    Admit date: 9/30/2024  Discharge date: 10/3/2024    Primary Care Physician: Ezequiel Grullon MD   Attending Physician: Georges Gill DO   Consults:   Consultants         Provider   Role Specialty     Jigar Morin MD      Consulting Physician Cardiovascular Diseases     Gee Callahan MD      Consulting Physician Cardiac Electrophysiology            Hospital Discharge Diagnoses:   Acute on chronic congestive heart failure, unspecified heart failure type (HCC)  ----See D/C Summary for further Dx    Reason for admission  Patient has dementia and is currently not at his baseline cognitive state.  When asked the patient how he is feeling he tells me fine.  Rest of Hx obtained from wife and caretaker at bedside.      Per wife patient apparently has not been eating very much and has been losing some weight.  It sounds as if on Sunday the patient ended up falling out of bed and the son-in-law came to pick the patient up.  Since that time patient has been significantly weak and unable to walk or climb stairs requiring family to move him around.  Patient normally ambulates slowly with a walker and is able to maneuver stairs.  Patient apparently also is cognitively more withdrawn and not as engaged.  He does have a history of CHF and aspiration. On admission patient was found to have a left forearm laceration that underwent Steri-Strips and Dermabond.  His chest x-ray was suggestive of possible pneumonia and pneumonia.  Per family and caretaker patient is supposed to be on thickened liquids but at times does not follow this as he does not like it, it sounds as if the family cuts up his food into small pieces.  Per caretaker patient tends to cough a lot with eating.         Hospital Course:     1-year-old gentleman with history of hypothyroidism, hypertension, dyslipidemia, osteoporosis, paroxysmal atrial  fibrillation,  prostate cancer, atrial fibrillation on Xarelto, PEm, status post permanent pacemaker, Alzheimer's disease, lung nodules, CKD stage 3, skin cancer, MGUS, chroni ITP, OA, oropharyngeal dysphagia, Dilated aorta,  chronic diastolic chf  who presents  weakness, cough, sob and falls, pt found to have acute on chronic diastolic and aspiration pna. Pt seen by palliative care as well as hospice. Pt discharged with residential hospice  see below for details       Acute on Chronic Diastolic CHF  -2022 echo with EF 61% with indeterminant diastolic dysfunction, mild to moderate MR, biatrial enlargement, small atrial septal defect dilated aorta  -CXR with chf and small B/L pleural effusions  with atelectasis ? PNA  -pro bnp 9830  -echo pending   -resume home lasix if pt taking oral intake      ? Aspiration PNA  -not compliant with modified diet at home  -afebrile. WBC normal. PCT 0.54  -CXR with chf and small B/L pleural effusions  with atelectasis ? PNA  -modified diet   -will stop abx at discharge as pt refusing po meds      S/P Fall   Left Forearm Laceration   -s/p steri strips and dermabond in ER  -9/30 S/P TDAP      Elevated troponin- demand ischemia, Type II MI  -troponin 59-->56-->58  -continue ASA and statin  -repeat echo  with EF 60-65%  -as per cards      Thrombocytopenia  -slight drop in platelets to 134     Oropharyngeal Dysphagia   -modified diet      CKD Stage 3   -stable      Permanent Atrial Fibrillation S/P Watchman  S/P PPM   HL  -EKG with V-pacing   -continue asa and statin      Alzheimers Dementia   -continue home meds-mirtazapine      Hypothyroidism  -continue synthoid      GOC  -DNR/Comfort   -wife Tara at BS has daughters Jack  -24 hour caretaker   -[facility]     MA/ACO Reach  -ER Visits 2024: 0  -Admissions 2024: 1  -7 Day Re- Entry: NO  -30 Day Re- Entry: NO  -Consults: cards and palliative care   -Discharge Needs: [facility]idential [facility]   -Appointments: [x] PCP  Ezequiel Grullon MD    EXAM:   NEURO: opens eyes briefly   RESP: labored   CARDIO: Regular  ABD: soft  EXTREMITIES: venous stasis changes, left forearm with steri strips and glue    Radiology:   US VENOUS DOPPLER LEG RIGHT - DIAG IMG (CPT=93971)    Result Date: 10/1/2024  CONCLUSION:  1. Negative right leg venous duplex exam. 2. No deep venous thrombosis.    Dictated by (CST): Gonzalo Sánchez MD on 10/01/2024 at 4:23 PM     Finalized by (CST): Gonzalo Sánchez MD on 10/01/2024 at 4:24 PM          XR CHEST AP PORTABLE  (CPT=71045)    Result Date: 10/1/2024  CONCLUSION:  1. Low lung volumes and small bilateral pleural effusions with associated basilar atelectasis, with or without superimposed pneumonia.  2. Cardiomegaly with mitral valvular prosthesis, mild pulmonary vascular congestion, and dual chamber pacer device.    A preliminary report was issued by the [facility] teleradiology service. There are no major discrepancies.   Dictated by (CST): Harley Craft MD on 10/01/2024 at 6:44 AM     Finalized by (CST): Harley Craft MD on 10/01/2024 at 6:48 AM           Discharge Instructions     Medication List        CHANGE how you take these medications      acetaminophen 500 MG Tabs  Commonly known as: Tylenol Extra Strength  Take 2 tablets (1,000 mg total) by mouth every 4 (four) hours as needed for Pain.  What changed:   how much to take  when to take this            CONTINUE taking these medications      aspirin 81 MG Tbec     atorvastatin 10 MG Tabs  Commonly known as: Lipitor     cyanocobalamin 1000 MCG Tabs  Commonly known as: Vitamin B12  Take 1 tablet (1,000 mcg total) by mouth daily.     docusate sodium 100 MG Caps  Commonly known as: COLACE     donepezil 10 MG Tabs  Commonly known as: Aricept     fluorouracil 5 % Crea  Commonly known as: Efudex     furosemide 40 MG Tabs  Commonly known as: Lasix     levothyroxine 112 MCG Tabs  Commonly known as: Synthroid     pantoprazole 40 MG  Tbec  Commonly known as: Protonix     Potassium Chloride ER 10 MEQ Tbcr     QUEtiapine 25 MG Tabs  Commonly known as: SEROquel            Code Status: DNAR/Comfort Care    Important follow up:   Follow-up Information       Ezequiel Grullon MD. Schedule an appointment as soon as possible for a visit in 1 week(s).    Specialty: Internal Medicine  Contact information:  [address]  [address]  [facility] [address]  [phone]                           Disposition:  Hospice, Residential   Discharged Condition: poor    =========================================================================================================================    I Reconciled current and discharge medications on day of discharge  Patient had opportunity to ask questions and state understand and agree with therapeutic plan as outlined    Total Time Coordinating Care: greater than 30 minutes  Is this a shared or split note between Advanced Practice Provider and Physician? Yes    Note: This chart was prepared using voice recognition software and may contain unintended word substitution errors.     Jessica Hooper RN, NP   [facility] Hospitalist Team   10/3/2024      SEE ATTENDING NOTE BELOW      Patient seen and examined independently.  Discussed with APN and agree with note above.    S:  Patient sleepy but denies pain    Objective:  /74 (BP Location: Left arm)   Pulse 71   Temp 98.8 °F (37.1 °C) (Axillary)   Resp 22   Ht 5' 5\" (1.651 m)   Wt 118 lb 8 oz (53.8 kg)   SpO2 94%   BMI 19.72 kg/m²     Gen: No acute distress, alert and oriented x1-2, frail and ill-appearing  Pulm: Coarse sounding breath sounds      Assessment and Plan:    Acute on chronic diastolic heart failure  Aspiration pneumonia  Frequent falls  Weakness    After extensive discussion patient was discharged home with residential hospice      Case discussed with nurse practitioner Sandei agree with rest of progress note as documented above

## 2024-10-03 NOTE — PLAN OF CARE
Problem: Impaired Swallowing  Goal: Minimize aspiration risk  Description: Interventions:  - Patient should be alert and upright for all feedings (90 degrees preferred)  - Offer food and liquids at a slow rate  - No straws  - Encourage small bites of food and small sips of liquid  - Offer pills one at a time, crush or deliver with applesauce as needed  - Discontinue feeding and notify MD (or speech pathologist) if coughing or persistent throat clearing or wet/gurgly vocal quality is noted  Outcome: Progressing     Problem: Impaired Functional Mobility  Goal: Achieve highest/safest level of mobility/gait  Description: Interventions:  - Assess patient's functional ability and stability  - Promote increasing activity/tolerance for mobility and gait  - Educate and engage patient/family in tolerated activity level and precautions  - Recommend use of chair position in bed 3 times per day  Outcome: Progressing     Aox1-2  Appears comfortable w/ family at bedside  Morphine given x1  Safety precautions maintained

## 2024-10-03 NOTE — CM/SW NOTE
LSW and liaison met with pt and family for hospice informational meeting. Pt's spouse Tara, Dtr Mahogany and Grnddtr Lexx were all present at bedside. Hospice philosophy, levels of care and Medicare benefit discussed. Family is ready to move forward with hospice. Pt's spouse consented to hospice, but deferred signing of consents to dtr, Mahogany. Mahogany signed hospice consents and comfort POLST as pt has cognitive impairment. POLST put in pt's chart for MD signature. Plan is for pt to discharge home tomorrow afternoon with [facility]. Pt will be going home by ambulance. LSW will update time of discharge when available. LSW discussed pt's discharge plan with MD Kathleen, MEGHNA Avila and JENNIFER Hudson.       Ana Cristina Smith, CALI  [facility]  [phone]

## 2024-10-03 NOTE — PHYSICAL THERAPY NOTE
Per chart review, patient/family decided to move forward with Hospice on 10/2 and patient to be discharge home this afternoon with Hospice services. Will DC PT at this time.

## 2024-10-03 NOTE — PROGRESS NOTES
Patient/ family provided with discharge instructions. Hospice provided additional discharge instructions this morning. Paper work ready to go. IV and Tele box removed. Transportation at bedside and ready to move patient.

## 2024-10-03 NOTE — OCCUPATIONAL THERAPY NOTE
chart reviewed. Attempted to see pt for OT today. Pt is transitioning to hospice and plan is to dc today. Will sign off at this time.    Zuleyma Rivera, OTR/L

## 2024-10-05 PROBLEM — R13.12 OROPHARYNGEAL DYSPHAGIA: Status: ACTIVE | Noted: 2024-10-05

## 2024-10-05 PROBLEM — Z51.5 PALLIATIVE CARE BY SPECIALIST: Status: ACTIVE | Noted: 2024-10-05

## 2024-10-05 PROBLEM — Z71.89 GOALS OF CARE, COUNSELING/DISCUSSION: Status: ACTIVE | Noted: 2024-10-05

## 2024-10-05 PROBLEM — R53.1 GENERALIZED WEAKNESS: Status: ACTIVE | Noted: 2024-10-05

## 2024-10-05 PROBLEM — G30.9 SEVERE ALZHEIMER'S DEMENTIA (HCC): Status: ACTIVE | Noted: 2024-10-05

## 2024-10-05 PROBLEM — J69.0 ASPIRATION PNEUMONIA (HCC): Status: ACTIVE | Noted: 2024-10-05

## 2024-10-05 PROBLEM — I50.33 ACUTE ON CHRONIC HEART FAILURE WITH PRESERVED EJECTION FRACTION (HFPEF) (HCC): Status: ACTIVE | Noted: 2024-10-05

## 2024-10-05 PROBLEM — F02.C0 SEVERE ALZHEIMER'S DEMENTIA (HCC): Status: ACTIVE | Noted: 2024-10-05

## (undated) DEVICE — GOWN SURG AERO BLUE PERF LG

## (undated) DEVICE — SHEET,DRAPE,40X58,STERILE: Brand: MEDLINE

## (undated) DEVICE — GLOVE SUR 7 SENSICARE PI PIP CRM PWD F

## (undated) DEVICE — SUTURE ETHBND XL 2-0 30IN NABSRB GRN 26MM SH 1/2 CIR

## (undated) DEVICE — SYRINGE BULB 50/CS 48/PLT: Brand: MEDEGEN MEDICAL PRODUCTS, LLC

## (undated) DEVICE — GLOVE SUR 6.5 SENSICARE PI PIP CRM PWD F

## (undated) DEVICE — MINOR GENERAL: Brand: MEDLINE INDUSTRIES, INC.

## (undated) DEVICE — 3M™ IOBAN™ 2 ANTIMICROBIAL INCISE DRAPE 6650EZ: Brand: IOBAN™ 2

## (undated) DEVICE — COVER LT HNDL RIG FOR SUR CAM DISP

## (undated) DEVICE — PACK DRAPE HEAD/NECK STER

## (undated) DEVICE — SUTURE NUROLON 0 C545G

## (undated) DEVICE — SUT PDS II 2-0 27IN SH ABSRB VLT L26MM 1/2

## (undated) DEVICE — PROXIMATE SKIN STAPLERS (35 WIDE) CONTAINS 35 STAINLESS STEEL STAPLES (FIXED HEAD): Brand: PROXIMATE

## (undated) DEVICE — SHEET, DRAPE, SPLIT, STERILE: Brand: MEDLINE

## (undated) DEVICE — BLADE 24 SS SRG STRL

## (undated) DEVICE — GLOVE SUR 7.5 SENSICARE PI PIP CRM PWD F

## (undated) DEVICE — SOL  .9 1000ML BTL

## (undated) DEVICE — ELECTRODE ES 2.75IN PTFE BLDE MOD E-Z CLN

## (undated) DEVICE — CLIP LIG M BLU TI HRT SHP WIRE HORZ

## (undated) DEVICE — YANKAUER,FLEXIBLE HANDLE,REGLR CAPACITY: Brand: MEDLINE INDUSTRIES, INC.

## (undated) DEVICE — DRAPE SHEET LAPAROTOMY

## (undated) DEVICE — SUTURE PLAIN GUT 3-0 CT-1

## (undated) DEVICE — ANTIBACTERIAL UNDYED BRAIDED (POLYGLACTIN 910), SYNTHETIC ABSORBABLE SUTURE: Brand: COATED VICRYL

## (undated) DEVICE — MARKER SKIN PREP RESIST STRL

## (undated) DEVICE — LAPAROTOMY SPONGE - RF AND X-RAY DETECTABLE PRE-WASHED: Brand: SITUATE

## (undated) DEVICE — #15 STERILE STAINLESS BLADE: Brand: STERILE STAINLESS BLADES

## (undated) DEVICE — SUTURE CHROMIC GUT 3-0 SH

## (undated) DEVICE — SUT PROL 2-0 30IN SH NABSRB BLU L26MM 1/2 CIR

## (undated) DEVICE — SUT PERMA- 2-0 30IN SH NABSRB BLK L26MM 1/

## (undated) DEVICE — STERILE LATEX POWDER-FREE SURGICAL GLOVESWITH NITRILE COATING: Brand: PROTEXIS

## (undated) DEVICE — E-Z CLEAN, NON-STICK, PTFE COATED, ELECTROSURGICAL NEEDLE ELECTRODE, MODIFIED EXTENDED INSULATION, 2.75 INCH (7 CM): Brand: MEGADYNE

## (undated) DEVICE — SOLUTION IRRIG 1000ML 0.9% NACL USP BTL

## (undated) DEVICE — PREMIUM WET SKIN PREP TRAY: Brand: MEDLINE INDUSTRIES, INC.

## (undated) DEVICE — CSTM UNIVERSAL DRAPE PK: Brand: MEDLINE INDUSTRIES, INC.

## (undated) DEVICE — ADHESIVE SKIN TOP FOR WND CLSR DERMBND ADV

## (undated) DEVICE — GOWN,SIRUS,FABRIC-REINFORCED,LARGE: Brand: MEDLINE

## (undated) NOTE — MR AVS SNAPSHOT
[facility]  [address]  [phone]               Thank you for choosing us for your health care visit with Raj Parisi MD.  We are glad to serve you and happy to provide you with this summary Take 1 capsule (100 mg total) by mouth 3 (three) times daily as needed for cough. Commonly known as:  TESSALON           * benzonatate 200 MG Caps   Take 1 capsule (200 mg total) by mouth 3 (three) times daily as needed for cough.    Commonly known as:  T

## (undated) NOTE — MR AVS SNAPSHOT
Nithin  [address]  [phone]               Thank you for choosing us [facility]   We are glad to serve you and happy to provide you with this summar Commonly known as:  SYNTHROID, LEVOTHROID           Vitamin D 1000 units Tabs   Take 2,000 Units by mouth. XARELTO 20 MG Tabs   Generic drug:  Rivaroxaban   Take 20 mg by mouth daily with food. * Notice:   This list has 2 medication(s) t

## (undated) NOTE — ED AVS SNAPSHOT
Casey Lee   MRN: [id number]    Department:  [facility] Emergency Department   Date of Visit:  9/15/2019           Disclosure     Insurance plans vary and the physician(s) referred by the ER may not be covered by your plan.  Please contact your within the next three months to obtain basic health screening including reassessment of your blood pressure.     IF THERE IS ANY CHANGE OR WORSENING OF YOUR CONDITION, CALL YOUR PRIMARY CARE PHYSICIAN AT ONCE OR RETURN IMMEDIATELY TO THE EMERGENCY DEPARTMEN

## (undated) NOTE — LETTER
[facility], [place]  Authorization for Invasive Procedures  1.  I hereby authorize Dr. Roque Barragan , my physician and whomever may be designated as the doctor's assistant, to perform the following operation and/or procedure:  Pacemaker performed for the purposes of advancing medicine, science, and/or education, provided my identity is not revealed. If the procedure has been videotaped, the physician/surgeon will obtain the original videotape.  The hospital will not be responsible for stor My signature below affirms that prior to the time of the procedure, I have explained to the patient and/or his legal representative, the risks and benefits involved in the proposed treatment and any reasonable alternative to the proposed treatment.  I have

## (undated) NOTE — ED AVS SNAPSHOT
[facility] Emergency Department    [place] [address]     [place] [address]    Phone:  [phone]    Fax:  [phone]           Hannah Aden   MRN: [id number]    Department:  [facility] Emergency Department   Date of Visit:  6/10/201 Commonly known as:  TESSALON   Take 1 capsule (100 mg total) by mouth 3 (three) times daily as needed for cough. predniSONE 20 MG Tabs   Quantity:  10 tablet   Commonly known as:  DELTASONE   Take 2 tablets (40 mg total) by mouth daily.             [facility] Emergency Department. Follow-up care is at the discretion of that Physician.   If you need additional assistance selecting a physician, you may call the iRewardChart Physician Referral and Class Registration line at [phone] [facility] [address] [address]  [place] [address]) [phone]                Additional Information       We are concerned for your overall well being:    - If you are a smoker or have smoked in the last 12 months, we encourage you to explore op

## (undated) NOTE — LETTER
Patient Name: Mireille Lantigua  YOB: 1934          MRN number:  [id number]  Date:  2/6/2024  Referring Physician: Ezequiel Grullon       ADULT VIDEOFLUOROSCOPIC SWALLOWING STUDY       ADULT VIDEOFLUOROSCOPIC SWALLOWING STUDY:   Referring Physician: Mitchel      Radiologist: Dr. Sánchez  Diagnosis: dysphagia    Date of Service: 2/6/2024     PATIENT SUMMARY   Chief Complaint: The patient has been coughing when eating.   Pt was seen for a VFSS during hospitalization on 12/21/23 which revealed intermittent aspiration of thin liquids.  See results below.  Thickened liquids and dysphagia therapy were recommended.  Pt was seen for home health speech therapy for dysphagia and was discharged on 1/3/24.  Further therapy to be scheduled as needed after this follow up assessment.    12/21/23 VFSS:  Pt presents with mild-moderate oropharyngeal dysphagia. Oral phase impaired as evidenced by oral holding with increased NIRALI and premature spillage to level of valleculae and pyriform sinuses. Intermittent laryngeal penetration with aspiration during and after swallow with thin liquids via open cup. No obvious penetration or aspiration observed with puree and mildly thick liquids. Mild pharyngeal retention observed in valleculae and pyriform sinuses. Recommend easy to chew solids and mildly thick liquids (no straws).     Current Diet: soft easy to chew diet and mildly thick liquids       Problem List  Active Problems:  Active Problems:    * No active hospital problems. *      Past Medical History  Past Medical History:   Diagnosis Date    Anemia 04/20/2011    Negative scopes    Aortic dilatation (HCC)     thoracic aorta - 4.6 cm -repeat CT chest in 8/2019    Arrhythmia     AFib-pacemaker July 2016 [facility]    ATRIAL FIBRILLATION     s/p cardioversion multiple times - now in [facility] - Dr. Daniel Jones, and Dr. Stevenson    Bilateral renal cysts     simple    Cataract     Clostridium difficile diarrhea     7/2020    Compression  fracture     T8, T12    Disorder of thyroid     Essential hypertension     H/O cardiovascular stress test     NM stress testing - unremarkable (9/2015)    Hearing impairment     bilateral hearing aides    High blood pressure     High cholesterol     History of colonic polyps     Repeat Colonscopy PRN    HLD (hyperlipidemia) 04/20/2011    HTN (hypertension)     HYPOTHYROIDISM     since 2008    IFG (impaired fasting glucose)     Osteoarthritis     Osteopenia     but with compression fractures    Pacemaker     Prostate cancer (HCC)     s/p prostatectomy -2003 - Urologist - Dr. Calvert    Pulmonary nodule     1.6 cm - stable x 3 years -no further f/up needed    Pulmonary nodule     new pulm nodule- repeat CT chest in 7/2018    Sacral fracture (HCC)     Shingles     Thrombocytopenia (HCC) 04/20/2011    Visual impairment     glasses        Imaging results: 12/20/23 Chest CT:  LUNGS AND PLEURA: Mild interlobular septal thickening is seen within the bilateral upper and lower lobes.  There are small trace bilateral pleural effusions. There is mild bronchial wall thickening suggestive of chronic airway inflammation. Nodularity   seen within the trachea and within the bilateral mainstem bronchi suggestive of retained secretions.       12/20/23 CXR:  Moderate interstitial edema, unchanged.     ASSESSMENT   DYSPHAGIA ASSESSMENT  Test completed in conjunction with Radiologist.   Food/Liquid Types Presented: puree, solid, nectar/mildly thick liquid, and thin liquids.    Study Position and View:  Patient was seated upright and viewed laterally.    Pain Assessment: The patient reports pain at a level of 0/10.    Oral phase:  Bilabial seal was WNL with no anterior food or liquid loss.  Oral preparation and transit times were mildly increased with intermittent oral hold of liquids before initiating the swallow.  Reduced bolus control and containment resulted in full to partial premature spillage of liquids.  Mastication times were  mild-moderately increased and with one dimensional up and down movement rather than rotary chewing. Mild oral residue was cleared with second swallow, occasionally requiring cue.     Pharyngeal phase:  The pharyngeal response triggered at the tongue base to valleculae for puree, the tongue base for solids, and the valleculae to pyriform sinuses for liquids due to premature spillage, with intermittent 2-3 second delay.  Premature spillage and incomplete epiglottic vestibular closure resulted in intermittent aspiration of thin liquids by cup and straw.  Pt coughed in response., but was not able to eject the aspiration from the trachea.  Reduced base of tongue retraction and pharyngeal stripping resulted in mild vallecular and minimal pyriform sinus retention.  Hyolaryngeal excursion appeared WNL.    Esophageal phase:   Adequate flow of bolus through upper esophagus     Penetration Aspiration Scale: 7/8.  Material entered the airway, passed below the vocal cords, and was not ejected from the trachea despite effort.    Overall Impression: Mild-moderate oropharyngeal dysphagia characterized by oral holding, increased oral transit times, premature spillage to level of valleculae and pyriform sinuses, intermittent laryngeal penetration with aspiration during the swallow with thin liquids via open cup. No obvious penetration or aspiration observed with any other consistency. Mild pharyngeal retention remained in valleculae and pyriform sinuses. Recommend easy to chew solids and mildly thick liquids (no straws) and continued home health dysphagia therapy with SLP.    FCM category and level: Swallowing, 4  PLAN   Potential: Good    Diet Recommendations:  Solids:  Soft easy to chew diet  Liquids:  Mildly thick liquids    Recommended compensatory strategies:   Sit upright  Small, single sips    Medication Administration:  Present with thickened liquids    Further Follow-up:  Recommend continued home health dysphagia therapy to  focus on airway closure, oral containment and improving productive cough.        EDUCATION/INSTRUCTION  Reviewed results and recommendations with patient and family.  Written instructions were provided.  Agreement/Understanding verbalized and all questions answered to their apparent satisfaction.      INTERDISCIPLINARY COMMUNICATION  Reviewed results with Radiologist; agreement verbalized.        Patient/Family/Caregiver was advised of these findings, precautions, recommendations and treatment options and has agreed to actively participate in planning and for this course of care.    Thank you for your referral. Please co-sign or sign and return this letter via fax as soon as possible. If you have any questions, please contact me at [phone].    Samina Vallecillo MA/[facility]-SLP  Speech Language Pathologist  [facility]  [phone]    Electronically signed by therapist: Samina Vallecillo, SLP  Physician's certification required:  Yes  I certify the need for these services furnished under this plan of treatment and while under my care.    X___________________________________________________ Date____________________    Certification From: 2/6/2024  To:5/6/2024

## (undated) NOTE — LETTER
[facility]  Administration of Anesthesia  1. I, Concepción Mcrae, or _________________________________ acting on his behalf, (Patient) (Dependent/Representative) request to receive anesthesia for my pending procedure/operation/treatment. A physician (anesthesiologist) alone or an anesthesiologist working with a nurse anesthetist may administer my anesthesia. 2. I understand that my anesthesiologist is not an employee or agent of the hospital, but is an independent medical practitioner who has been permitted to use its facilities for the care and treatment of his/her patients. 3. I acknowledge that a physician from [facility] or their designate(s), recommended anesthesia for me using her/his medical judgment. The type(s) of anesthesia I may receive include:                a) General Anesthesia, b) Spinal/Epidural Anesthesia, c) Regional Anesthesia or d) Monitored Anesthesia Care. 4. If my spinal, regional or monitored anesthesia care (local) is not satisfactory for my comfort, or if my medical condition requires, I consent to the administration of general anesthesia. 5. I am aware that the practice of anesthesiology is not an exact science and that some foreseeable risks or consequences may occur. Some common risks/consequences include sore throat and hoarseness, nausea and vomiting, muscle soreness, backache, damage to the mouth/teeth/vocal cords and eye injury. I understand that more rare but serious potential risks of anesthesia include blood pressure changes, drug reactions, cardiac arrest, brain damage, paralysis or death. These risks apply to whether I have general, spinal/epidural, regional or monitored anesthesia care. 6. OBSTETRIC PATIENTS: Specific risks/consequences of spinal/epidural anesthesia may include itching, low blood pressure, difficulty urinating, slowing of the baby's heart rate and headache.  Rare risks include infections, high spinal block, spinal bleeding, seizure, cardiac arrest and death. 7. AWARENESS: I understand that it is possible (but unlikely) to have explicit memory of events from the operating room while under general anesthesia. 8. ELECTROCONVULSIVE THERAPY PATIENTS: This consent serves for all treatments in a single course of therapy. 9. I understand that I must inform my anesthesiologist when I last ate and/or drank to minimize the risk of anesthesia. 10. If I am pregnant, or may pregnant, I understand that elective surgery should be postponed until after the baby is born. Anesthetics cross the placenta and may temporarily anesthetize the baby. Although fetal complications of anesthesia during pregnancy are rare, they may include birth defects, premature labor, brain damage and death. 11. I certify that I informed the anesthesiologist, to the best of my ability, about medication I take including blood thinners, anticoagulants, herbal remedies, narcotics and recreational drugs (e.g. cocaine, marijuana, PCP). Failure to inform my anesthesiologist about these medicines may increase my risk of anesthetic complications. The nature and purpose of my anesthetic management was explained to me. I had the opportunity to ask questions and the answers and information provided meet my satisfaction.   I retain the right to withdraw this consent at any time prior to the administration of said anesthetic.    ___________________________________________________           _____________________________________________________  Patient Signature                                                                                      Witness Signature                ___________________________________________________           _____________________________________________________  Date/Time                                                                                               Responsible person in case of minor/ unconscious pt /Relationship    My signature below affirms that prior to the time of the procedure, I have explained to the patient and/or his/her guardian, the risks and benefits of undergoing anesthesia, as well as any reasonable alternatives.     ___________________________________________________            _____________________________________________________  Physician Signature                            Date/Time  Patient Name: Sandhya Alvarado     : 1934     Printed: 2022      Medical Record #: [id number]                              Page 1 of 1    ----------ANESTHESIA CONSENT----------

## (undated) NOTE — ED AVS SNAPSHOT
[facility] AND [facility] in [address] [place] [address] [address]    Phone:  [phone]    Fax:  [phone]           Jay Jay Mckinney   MRN: [id number]    Department:  [facility] in [address] [place] [address]   Date of Visit:  3/17/ Insurance plans vary and the physician(s) referred by the Immediate Care may not be covered by your plan. It is possible that the physician may not participate in your health insurance plan.   This may result in a lower benefit level being available to yo CARE PHYSICIAN AT ONCE OR GO TO THE EMERGENCY DEPARTMENT. If you have been prescribed any medication(s), please fill your prescription right away and begin taking the medication(s) as directed.   If you believe that any of the medications or instructions - If you have concerns related to behavioral health issues or thoughts of harming yourself, contact [address] [place] [address] at [phone].     - If you don’t have insurance, Jeremy Locke has partnered with [employer]

## (undated) NOTE — IP AVS SNAPSHOT
[facility]    [address], [place], [place] ~ [phone]                Discharge Summary   4/21/2017    Mireille [facility]           Admission Information        Provider Department    4/21/2017 Kristie Ochoa MD [facility] Pre-Op [place] · Do not drink alcohol or take sleeping pills or tranquilizers   · Do not sign legal documents within 24 hours of your procedure   · If you had a nerve block for your surgery, take extra care not to put any pressure on your arm or hand for 24 hours    It i If you receive a questionnaire about your surgery, please take a few minutes to answer the questions and return in the provided self-addressed stamped envelope.   The questionnaire should take no longer than a few minutes to complete and your answers are pr progressive redness, pain, or drainage should be reported after the first two days.     Follow-up  If you have any questions or problems, such as fever, persistent nausea, bowel problems, increasing pain not controlled by your pain medication or poor appeti (03/29/17)  58.5 (03/29/17)  26.1 (03/29/17)  12.6 (03/29/17)  2.3 -- -- (03/29/17)  2.33 (03/29/17)  1.04 (03/29/17)  0.50 (03/29/17)  0.09 (03/29/17)  2.83      Metabolic Lab Results  (Last result in the past 90 days)    HgbA1C Glucose BUN Creatinine Ca If you've recently had a stay at the Hospital you can access your discharge instructions in [employer] by going to Visits < Admission Summaries.  If you've been to the Emergency Department or your doctor's office, you can view your past visit information in My

## (undated) NOTE — MR AVS SNAPSHOT
[place]  [address]  [phone]               Thank you for choosing [facility]   We are glad to serve you and happy to provide you with this summar Commonly known as:  SYNTHROID, LEVOTHROID           omeprazole 20 MG Cpdr   Take 1 capsule (20 mg total) by mouth daily. Commonly known as:  PRILOSEC           Vitamin D 1000 units Tabs   Take 2,000 Units by mouth.            XARELTO 20 MG Tabs   Generic

## (undated) NOTE — ED AVS SNAPSHOT
Dalton Patel   MRN: [id number]    Department:  [facility] Emergency Department   Date of Visit:  5/6/2018           Disclosure     Insurance plans vary and the physician(s) referred by the ER may not be covered by your plan.  Please contact your within the next three months to obtain basic health screening including reassessment of your blood pressure.     IF THERE IS ANY CHANGE OR WORSENING OF YOUR CONDITION, CALL YOUR PRIMARY CARE PHYSICIAN AT ONCE OR RETURN IMMEDIATELY TO THE EMERGENCY DEPARTMEN

## (undated) NOTE — IP AVS SNAPSHOT
[facility]            (For Outpatient Use Only) Initial Admit Date: 2022   Inpt/Obs Admit Date: Inpt: 22 / Obs: 22   Discharge Date:    Hospital Acct:  [de-identified]   MRN: [de-identified]   CSN: [id number]   CEID: [id number]        ENCOUNTER  Patient Class: Inpatient Admitting Provider: Lane Vargas MD Unit: [address] Service: Cardiac Telemetry Attending Provider: Leila Owen MD   Bed: 322-A   Visit Type:   Referring Physician: No ref. provider found Billing Flag:    Admit Diagnosis: Dyspnea on exertion [R06.09]      PATIENT  Legal Name:   Marky St    Legal Sex: Male  Gender ID:              [address] [facility] Name:    PCP:  Destiny Vazquez MD Home: [phone]   Address:  [place] [address] : 1934 (88 yrs) Mobile: [phone]; [phone]         City/State/Zip: [address] [address] Marital:  Language: Janette park: Tanvi SSN4: [id number] Yarsani: [address] Not Shanae Mulberry*     Race: White Ethnicity: Non  Or [address] [place] [address]   Name Relationship Legal Guardian? Home Phone Work Phone Mobile Phone   1. Tara Lantigua  2.  Lata Pabon  Daughter   No [phone]     GUARANTOR  Guarantor: Radha Mccloud : 1934 Home Phone: [phone]   Address: [place] [address]  Sex: Male Work Phone:    City/State/Zip: [address] [address]   Rel. to Patient: Self Guarantor ID: [id number]   GUARANTOR EMPLOYER   Employer:  Status: RETIRED     COVERAGE  PRIMARY INSURANCE   Payor: MEDICARE Plan: MEDICARE PART A&B   Group Number:  Insurance Type: INDEMNITY   Subscriber Name: Rita Aceves : 1934   Subscriber ID: [id number] Pt Rel to Subscriber: Self   SECONDARY INSURANCE   [place] Plan: BC[place] PPO   Group Number: [id number] [facility] Type: [facility] Name: Rita Aceves : 1934   Subscriber ID: [id number] Pt Rel to Subscriber: SELF   TERTIARY INSURANCE   Payor: Plan:    Group Number:  Insurance Type:    Subscriber Name:  Subscriber :    Subscriber ID:  Pt Rel to Subscriber:    Hospital Account Financial Class: Medicare    2022

## (undated) NOTE — MR AVS SNAPSHOT
[place]  [address]  [phone]               Thank you for choosing us [facility]   We are glad to serve you and happy to provide you with this summar view more details from this visit by going to [email or web address]. If you've recently had a stay at the Hospital you can access your discharge instructions in [employer]34[employer]hart by going to Visits < Admission Summaries.  If you've been to the Emergency Depar priority on exercise in your life                    Visit [facility] online at  [email or web address]